# Patient Record
Sex: MALE | Race: WHITE | Employment: OTHER | URBAN - NONMETROPOLITAN AREA
[De-identification: names, ages, dates, MRNs, and addresses within clinical notes are randomized per-mention and may not be internally consistent; named-entity substitution may affect disease eponyms.]

---

## 2017-01-03 ENCOUNTER — PROCEDURE VISIT (OUTPATIENT)
Dept: CARDIOLOGY | Age: 77
End: 2017-01-03

## 2017-01-03 DIAGNOSIS — Z95.810 ICD (IMPLANTABLE CARDIOVERTER-DEFIBRILLATOR), SINGLE, IN SITU: Primary | ICD-10-CM

## 2017-01-03 PROCEDURE — 93282 PRGRMG EVAL IMPLANTABLE DFB: CPT | Performed by: INTERNAL MEDICINE

## 2017-04-03 RX ORDER — DIGOXIN 125 UG/1
TABLET ORAL
Qty: 90 TABLET | Refills: 3 | Status: SHIPPED | OUTPATIENT
Start: 2017-04-03 | End: 2018-04-12 | Stop reason: SDUPTHER

## 2017-04-13 ENCOUNTER — PROCEDURE VISIT (OUTPATIENT)
Dept: CARDIOLOGY | Age: 77
End: 2017-04-13

## 2017-04-13 DIAGNOSIS — Z95.810 ICD (IMPLANTABLE CARDIOVERTER-DEFIBRILLATOR), SINGLE, IN SITU: Primary | ICD-10-CM

## 2017-04-13 PROCEDURE — 93295 DEV INTERROG REMOTE 1/2/MLT: CPT | Performed by: INTERNAL MEDICINE

## 2017-04-13 PROCEDURE — 93296 REM INTERROG EVL PM/IDS: CPT | Performed by: INTERNAL MEDICINE

## 2017-06-27 ENCOUNTER — OFFICE VISIT (OUTPATIENT)
Dept: CARDIOLOGY | Age: 77
End: 2017-06-27

## 2017-06-27 VITALS
SYSTOLIC BLOOD PRESSURE: 142 MMHG | HEART RATE: 64 BPM | DIASTOLIC BLOOD PRESSURE: 96 MMHG | WEIGHT: 188.4 LBS | BODY MASS INDEX: 24.86 KG/M2

## 2017-06-27 DIAGNOSIS — I25.10 CORONARY ARTERY DISEASE INVOLVING NATIVE CORONARY ARTERY OF NATIVE HEART WITHOUT ANGINA PECTORIS: Primary | ICD-10-CM

## 2017-06-27 DIAGNOSIS — I42.9 CARDIOMYOPATHY (HCC): ICD-10-CM

## 2017-06-27 DIAGNOSIS — I10 ESSENTIAL HYPERTENSION: ICD-10-CM

## 2017-06-27 DIAGNOSIS — Z95.810 ICD (IMPLANTABLE CARDIOVERTER-DEFIBRILLATOR) IN PLACE: ICD-10-CM

## 2017-06-27 PROCEDURE — 1123F ACP DISCUSS/DSCN MKR DOCD: CPT | Performed by: NUCLEAR MEDICINE

## 2017-06-27 PROCEDURE — G8427 DOCREV CUR MEDS BY ELIG CLIN: HCPCS | Performed by: NUCLEAR MEDICINE

## 2017-06-27 PROCEDURE — 4040F PNEUMOC VAC/ADMIN/RCVD: CPT | Performed by: NUCLEAR MEDICINE

## 2017-06-27 PROCEDURE — 99213 OFFICE O/P EST LOW 20 MIN: CPT | Performed by: NUCLEAR MEDICINE

## 2017-06-27 PROCEDURE — 1036F TOBACCO NON-USER: CPT | Performed by: NUCLEAR MEDICINE

## 2017-06-27 PROCEDURE — G8420 CALC BMI NORM PARAMETERS: HCPCS | Performed by: NUCLEAR MEDICINE

## 2017-06-27 PROCEDURE — G8598 ASA/ANTIPLAT THER USED: HCPCS | Performed by: NUCLEAR MEDICINE

## 2017-07-20 ENCOUNTER — PROCEDURE VISIT (OUTPATIENT)
Dept: CARDIOLOGY CLINIC | Age: 77
End: 2017-07-20
Payer: MEDICARE

## 2017-07-20 DIAGNOSIS — Z95.810 ICD (IMPLANTABLE CARDIOVERTER-DEFIBRILLATOR), SINGLE, IN SITU: Primary | ICD-10-CM

## 2017-07-20 PROCEDURE — 93295 DEV INTERROG REMOTE 1/2/MLT: CPT | Performed by: INTERNAL MEDICINE

## 2017-07-20 PROCEDURE — 93296 REM INTERROG EVL PM/IDS: CPT | Performed by: INTERNAL MEDICINE

## 2017-08-14 RX ORDER — LISINOPRIL 5 MG/1
TABLET ORAL
Qty: 90 TABLET | Refills: 1 | Status: SHIPPED | OUTPATIENT
Start: 2017-08-14 | End: 2018-01-31 | Stop reason: SDUPTHER

## 2017-09-15 RX ORDER — BUMETANIDE 2 MG/1
TABLET ORAL
Qty: 180 TABLET | Refills: 2 | Status: SHIPPED | OUTPATIENT
Start: 2017-09-15 | End: 2018-03-07 | Stop reason: CLARIF

## 2017-10-25 ENCOUNTER — PROCEDURE VISIT (OUTPATIENT)
Dept: CARDIOLOGY CLINIC | Age: 77
End: 2017-10-25
Payer: MEDICARE

## 2017-10-25 DIAGNOSIS — Z95.810 ICD (IMPLANTABLE CARDIOVERTER-DEFIBRILLATOR), SINGLE, IN SITU: Primary | ICD-10-CM

## 2017-10-25 PROCEDURE — 93295 DEV INTERROG REMOTE 1/2/MLT: CPT | Performed by: INTERNAL MEDICINE

## 2017-10-25 PROCEDURE — 93296 REM INTERROG EVL PM/IDS: CPT | Performed by: INTERNAL MEDICINE

## 2018-01-02 ENCOUNTER — NURSE ONLY (OUTPATIENT)
Dept: CARDIOLOGY CLINIC | Age: 78
End: 2018-01-02
Payer: MEDICARE

## 2018-01-02 DIAGNOSIS — I48.20 CHRONIC ATRIAL FIBRILLATION (HCC): Primary | ICD-10-CM

## 2018-01-02 DIAGNOSIS — R60.0 FLUID RETENTION IN LEGS: ICD-10-CM

## 2018-01-02 DIAGNOSIS — Z95.810 ICD (IMPLANTABLE CARDIOVERTER-DEFIBRILLATOR), SINGLE, IN SITU: Primary | ICD-10-CM

## 2018-01-02 PROCEDURE — 93282 PRGRMG EVAL IMPLANTABLE DFB: CPT | Performed by: INTERNAL MEDICINE

## 2018-01-02 NOTE — PROGRESS NOTES
9 years on device   R waves 6.2  81% paced   RV imped 330 shock 57  RV threshold 0.6 @ 0.4, amplitude to 2.3    Has home monitoring

## 2018-01-03 RX ORDER — FUROSEMIDE 40 MG/1
40 TABLET ORAL 2 TIMES DAILY
COMMUNITY
End: 2018-01-03 | Stop reason: SDUPTHER

## 2018-01-03 RX ORDER — FUROSEMIDE 40 MG/1
40 TABLET ORAL 2 TIMES DAILY
Qty: 60 TABLET | Refills: 1 | Status: SHIPPED | OUTPATIENT
Start: 2018-01-03 | End: 2018-02-22 | Stop reason: SDUPTHER

## 2018-01-24 LAB
BUN BLDV-MCNC: 11 MG/DL
CALCIUM SERPL-MCNC: 8.6 MG/DL
CHLORIDE BLD-SCNC: 102 MMOL/L
CO2: 32 MMOL/L
CREAT SERPL-MCNC: 1.4 MG/DL
GFR CALCULATED: 52
GLUCOSE BLD-MCNC: 122 MG/DL
POTASSIUM SERPL-SCNC: 4 MMOL/L
SODIUM BLD-SCNC: 143 MMOL/L

## 2018-01-31 RX ORDER — LISINOPRIL 5 MG/1
TABLET ORAL
Qty: 90 TABLET | Refills: 3 | Status: SHIPPED | OUTPATIENT
Start: 2018-01-31 | End: 2018-05-15 | Stop reason: ALTCHOICE

## 2018-02-22 RX ORDER — FUROSEMIDE 40 MG/1
TABLET ORAL
Qty: 120 TABLET | Refills: 3 | Status: SHIPPED | OUTPATIENT
Start: 2018-02-22 | End: 2018-06-18 | Stop reason: SDUPTHER

## 2018-02-27 ENCOUNTER — OFFICE VISIT (OUTPATIENT)
Dept: CARDIOLOGY CLINIC | Age: 78
End: 2018-02-27
Payer: MEDICARE

## 2018-02-27 VITALS
WEIGHT: 191.2 LBS | BODY MASS INDEX: 25.34 KG/M2 | HEIGHT: 73 IN | DIASTOLIC BLOOD PRESSURE: 64 MMHG | SYSTOLIC BLOOD PRESSURE: 104 MMHG | HEART RATE: 68 BPM

## 2018-02-27 DIAGNOSIS — I48.0 PAROXYSMAL ATRIAL FIBRILLATION (HCC): ICD-10-CM

## 2018-02-27 DIAGNOSIS — I25.5 ISCHEMIC CARDIOMYOPATHY: ICD-10-CM

## 2018-02-27 DIAGNOSIS — Z45.02 ICD (IMPLANTABLE CARDIOVERTER-DEFIBRILLATOR) BATTERY DEPLETION: ICD-10-CM

## 2018-02-27 DIAGNOSIS — R06.09 DYSPNEA ON EXERTION: Primary | ICD-10-CM

## 2018-02-27 DIAGNOSIS — I10 ESSENTIAL HYPERTENSION: ICD-10-CM

## 2018-02-27 PROCEDURE — G8419 CALC BMI OUT NRM PARAM NOF/U: HCPCS | Performed by: NUCLEAR MEDICINE

## 2018-02-27 PROCEDURE — G8598 ASA/ANTIPLAT THER USED: HCPCS | Performed by: NUCLEAR MEDICINE

## 2018-02-27 PROCEDURE — 99214 OFFICE O/P EST MOD 30 MIN: CPT | Performed by: NUCLEAR MEDICINE

## 2018-02-27 PROCEDURE — 1123F ACP DISCUSS/DSCN MKR DOCD: CPT | Performed by: NUCLEAR MEDICINE

## 2018-02-27 PROCEDURE — 4040F PNEUMOC VAC/ADMIN/RCVD: CPT | Performed by: NUCLEAR MEDICINE

## 2018-02-27 PROCEDURE — G8428 CUR MEDS NOT DOCUMENT: HCPCS | Performed by: NUCLEAR MEDICINE

## 2018-02-27 PROCEDURE — 1036F TOBACCO NON-USER: CPT | Performed by: NUCLEAR MEDICINE

## 2018-02-27 PROCEDURE — G8484 FLU IMMUNIZE NO ADMIN: HCPCS | Performed by: NUCLEAR MEDICINE

## 2018-02-27 NOTE — PROGRESS NOTES
[facility]ESSION[facility]  [facility]  [address] [facility] [address]  Dept: [phone]  Dept Fax: [phone]  Loc: [phone]    Visit Date: 2/27/2018    Zohra Yanez is a 76 y.o. male who presents today for:  Chief Complaint   Patient presents with    Check-Up    Coronary Artery Disease    Shortness of Breath    Cardiomyopathy   worsening dyspnea  More at night   Some CHF  No ICD shocks  Some renal disease  Leg edema  Exertional   A fib is stable   Lower BP   No ischemia work up in a while   Some tachycardia       HPI:  HPI  Past Medical History:   Diagnosis Date    Arthritis     Atrial fibrillation [facility])     Direct Access [employer] Scientific single ICD 1/6/2016    Cardiomyopathy ([address]     Cardiomyopathy, likely nonischemic based on the patient's description. However, don't have a cardiac cath report from [place].  Hypertension     ICD (implantable cardiac defibrillator) battery depletion: 9/24/2013: Generator replacement using [employer] 9/24/2013 9/24/2013: Generator replacement using [employer] device. Atrial lead was capped. Dr. Antonio Cruz ICD (implantable cardiac defibrillator), dual, in situ     [employer] dual ICD    Low blood pressure     Low blood pressure with rapid atrial fibrillation.  Prolonged emergence from general anesthesia       Past Surgical History:   Procedure Laterality Date   [facility] CARDIAC PACEMAKER PLACEMENT  10 03 2007    [place], [place].  CARDIAC PACEMAKER PLACEMENT      9/13    CHOLECYSTECTOMY      DOPPLER ECHOCARDIOGRAPHY  7 08 2010    Global LV dilatation and dysfunction. EF 35%. Moderate biatrial enlargement. Mild mitral regurgitation and mild tricuspid regurgitation. No obvious stenotic valves. Borderline to mild pulmonary hypertension. No pericardial effusion.     HAND SURGERY  06/26/2017   [facility] HERNIA REPAIR  [address] [place], [place].    OTHER SURGICAL HISTORY Left 01/20/2017    Left CMC arthroplasty    UPPER

## 2018-03-14 ENCOUNTER — TELEPHONE (OUTPATIENT)
Dept: CARDIOLOGY CLINIC | Age: 78
End: 2018-03-14

## 2018-03-21 ENCOUNTER — TELEPHONE (OUTPATIENT)
Dept: CARDIOLOGY CLINIC | Age: 78
End: 2018-03-21

## 2018-03-21 DIAGNOSIS — R94.39 ABNORMAL STRESS TEST: ICD-10-CM

## 2018-03-21 DIAGNOSIS — I48.0 PAROXYSMAL ATRIAL FIBRILLATION (HCC): Primary | ICD-10-CM

## 2018-03-21 DIAGNOSIS — I10 ESSENTIAL HYPERTENSION: ICD-10-CM

## 2018-03-28 DIAGNOSIS — Z45.02 ICD (IMPLANTABLE CARDIOVERTER-DEFIBRILLATOR) BATTERY DEPLETION: ICD-10-CM

## 2018-03-28 DIAGNOSIS — R06.09 DYSPNEA ON EXERTION: ICD-10-CM

## 2018-03-28 DIAGNOSIS — I10 ESSENTIAL HYPERTENSION: ICD-10-CM

## 2018-03-28 DIAGNOSIS — I25.5 ISCHEMIC CARDIOMYOPATHY: ICD-10-CM

## 2018-03-28 DIAGNOSIS — I48.0 PAROXYSMAL ATRIAL FIBRILLATION (HCC): ICD-10-CM

## 2018-04-06 ENCOUNTER — PREP FOR PROCEDURE (OUTPATIENT)
Dept: CARDIOLOGY | Age: 78
End: 2018-04-06

## 2018-04-06 RX ORDER — SODIUM CHLORIDE 0.9 % (FLUSH) 0.9 %
10 SYRINGE (ML) INJECTION EVERY 12 HOURS SCHEDULED
Status: CANCELLED | OUTPATIENT
Start: 2018-04-06

## 2018-04-06 RX ORDER — NITROGLYCERIN 0.4 MG/1
0.4 TABLET SUBLINGUAL EVERY 5 MIN PRN
Status: CANCELLED | OUTPATIENT
Start: 2018-04-06

## 2018-04-06 RX ORDER — SODIUM CHLORIDE 0.9 % (FLUSH) 0.9 %
10 SYRINGE (ML) INJECTION PRN
Status: CANCELLED | OUTPATIENT
Start: 2018-04-06

## 2018-04-06 RX ORDER — ASPIRIN 325 MG
325 TABLET ORAL ONCE
Status: CANCELLED | OUTPATIENT
Start: 2018-04-06 | End: 2018-04-06

## 2018-04-06 RX ORDER — SODIUM CHLORIDE 9 MG/ML
INJECTION, SOLUTION INTRAVENOUS CONTINUOUS
Status: CANCELLED | OUTPATIENT
Start: 2018-04-06

## 2018-04-09 ENCOUNTER — HOSPITAL ENCOUNTER (OUTPATIENT)
Dept: INPATIENT UNIT | Age: 78
Discharge: HOME | End: 2018-04-09
Attending: NUCLEAR MEDICINE | Admitting: NUCLEAR MEDICINE
Payer: MEDICARE

## 2018-04-09 ENCOUNTER — APPOINTMENT (OUTPATIENT)
Dept: CARDIAC CATH/INVASIVE PROCEDURES | Age: 78
End: 2018-04-09
Attending: NUCLEAR MEDICINE
Payer: MEDICARE

## 2018-04-09 ENCOUNTER — TELEPHONE (OUTPATIENT)
Dept: CARDIOLOGY CLINIC | Age: 78
End: 2018-04-09

## 2018-04-09 VITALS
HEIGHT: 72 IN | SYSTOLIC BLOOD PRESSURE: 131 MMHG | DIASTOLIC BLOOD PRESSURE: 77 MMHG | OXYGEN SATURATION: 100 % | BODY MASS INDEX: 25.06 KG/M2 | TEMPERATURE: 97.6 F | WEIGHT: 185 LBS | RESPIRATION RATE: 13 BRPM | HEART RATE: 60 BPM

## 2018-04-09 LAB
ABO: NORMAL
ANION GAP SERPL CALCULATED.3IONS-SCNC: 9 MEQ/L (ref 8–16)
ANTIBODY SCREEN: NORMAL
APTT: 37 SECONDS (ref 22–38)
BUN BLDV-MCNC: 16 MG/DL (ref 7–22)
CALCIUM SERPL-MCNC: 9.4 MG/DL (ref 8.5–10.5)
CHLORIDE BLD-SCNC: 102 MEQ/L (ref 98–111)
CO2: 32 MEQ/L (ref 23–33)
CREAT SERPL-MCNC: 1.1 MG/DL (ref 0.4–1.2)
EKG ATRIAL RATE: 59 BPM
EKG Q-T INTERVAL: 538 MS
EKG QRS DURATION: 224 MS
EKG QTC CALCULATION (BAZETT): 538 MS
EKG R AXIS: -90 DEGREES
EKG T AXIS: 90 DEGREES
EKG VENTRICULAR RATE: 60 BPM
GFR SERPL CREATININE-BSD FRML MDRD: 64 ML/MIN/1.73M2
GLUCOSE BLD-MCNC: 95 MG/DL (ref 70–108)
HCT VFR BLD CALC: 40.7 % (ref 42–52)
HEMOGLOBIN: 13.7 GM/DL (ref 14–18)
INR BLD: 1.54 (ref 0.85–1.13)
MCH RBC QN AUTO: 32 PG (ref 27–31)
MCHC RBC AUTO-ENTMCNC: 33.7 GM/DL (ref 33–37)
MCV RBC AUTO: 94.9 FL (ref 80–94)
PDW BLD-RTO: 15.4 % (ref 11.5–14.5)
PLATELET # BLD: 131 THOU/MM3 (ref 130–400)
PMV BLD AUTO: 8.2 FL (ref 7.4–10.4)
POTASSIUM REFLEX MAGNESIUM: 4.7 MEQ/L (ref 3.5–5.2)
RBC # BLD: 4.29 MILL/MM3 (ref 4.7–6.1)
RH FACTOR: NORMAL
SODIUM BLD-SCNC: 143 MEQ/L (ref 135–145)
WBC # BLD: 3.7 THOU/MM3 (ref 4.8–10.8)

## 2018-04-09 PROCEDURE — 85610 PROTHROMBIN TIME: CPT

## 2018-04-09 PROCEDURE — 86850 RBC ANTIBODY SCREEN: CPT

## 2018-04-09 PROCEDURE — C1894 INTRO/SHEATH, NON-LASER: HCPCS

## 2018-04-09 PROCEDURE — 2720000010 HC SURG SUPPLY STERILE

## 2018-04-09 PROCEDURE — 36415 COLL VENOUS BLD VENIPUNCTURE: CPT

## 2018-04-09 PROCEDURE — 85027 COMPLETE CBC AUTOMATED: CPT

## 2018-04-09 PROCEDURE — 6370000000 HC RX 637 (ALT 250 FOR IP): Performed by: PHYSICIAN ASSISTANT

## 2018-04-09 PROCEDURE — 2580000003 HC RX 258: Performed by: PHYSICIAN ASSISTANT

## 2018-04-09 PROCEDURE — 2500000003 HC RX 250 WO HCPCS

## 2018-04-09 PROCEDURE — 2780000010 HC IMPLANT OTHER

## 2018-04-09 PROCEDURE — 86900 BLOOD TYPING SEROLOGIC ABO: CPT

## 2018-04-09 PROCEDURE — 86901 BLOOD TYPING SEROLOGIC RH(D): CPT

## 2018-04-09 PROCEDURE — 93458 L HRT ARTERY/VENTRICLE ANGIO: CPT | Performed by: NUCLEAR MEDICINE

## 2018-04-09 PROCEDURE — 6360000002 HC RX W HCPCS

## 2018-04-09 PROCEDURE — C1769 GUIDE WIRE: HCPCS

## 2018-04-09 PROCEDURE — 85730 THROMBOPLASTIN TIME PARTIAL: CPT

## 2018-04-09 PROCEDURE — 80048 BASIC METABOLIC PNL TOTAL CA: CPT

## 2018-04-09 PROCEDURE — 93005 ELECTROCARDIOGRAM TRACING: CPT | Performed by: PHYSICIAN ASSISTANT

## 2018-04-09 RX ORDER — SODIUM CHLORIDE 0.9 % (FLUSH) 0.9 %
10 SYRINGE (ML) INJECTION EVERY 12 HOURS SCHEDULED
Status: DISCONTINUED | OUTPATIENT
Start: 2018-04-09 | End: 2018-04-09 | Stop reason: HOSPADM

## 2018-04-09 RX ORDER — NITROGLYCERIN 0.4 MG/1
0.4 TABLET SUBLINGUAL EVERY 5 MIN PRN
Status: DISCONTINUED | OUTPATIENT
Start: 2018-04-09 | End: 2018-04-09 | Stop reason: HOSPADM

## 2018-04-09 RX ORDER — ASPIRIN 325 MG
325 TABLET ORAL ONCE
Status: COMPLETED | OUTPATIENT
Start: 2018-04-09 | End: 2018-04-09

## 2018-04-09 RX ORDER — SODIUM CHLORIDE 0.9 % (FLUSH) 0.9 %
10 SYRINGE (ML) INJECTION PRN
Status: DISCONTINUED | OUTPATIENT
Start: 2018-04-09 | End: 2018-04-09 | Stop reason: SDUPTHER

## 2018-04-09 RX ORDER — SODIUM CHLORIDE 0.9 % (FLUSH) 0.9 %
10 SYRINGE (ML) INJECTION PRN
Status: DISCONTINUED | OUTPATIENT
Start: 2018-04-09 | End: 2018-04-09 | Stop reason: HOSPADM

## 2018-04-09 RX ORDER — ONDANSETRON 2 MG/ML
4 INJECTION INTRAMUSCULAR; INTRAVENOUS EVERY 6 HOURS PRN
Status: DISCONTINUED | OUTPATIENT
Start: 2018-04-09 | End: 2018-04-09 | Stop reason: HOSPADM

## 2018-04-09 RX ORDER — SODIUM CHLORIDE 0.9 % (FLUSH) 0.9 %
10 SYRINGE (ML) INJECTION EVERY 12 HOURS SCHEDULED
Status: DISCONTINUED | OUTPATIENT
Start: 2018-04-09 | End: 2018-04-09 | Stop reason: SDUPTHER

## 2018-04-09 RX ORDER — SODIUM CHLORIDE 9 MG/ML
INJECTION, SOLUTION INTRAVENOUS CONTINUOUS
Status: DISCONTINUED | OUTPATIENT
Start: 2018-04-09 | End: 2018-04-09 | Stop reason: HOSPADM

## 2018-04-09 RX ORDER — ACETAMINOPHEN 325 MG/1
650 TABLET ORAL EVERY 4 HOURS PRN
Status: DISCONTINUED | OUTPATIENT
Start: 2018-04-09 | End: 2018-04-09 | Stop reason: HOSPADM

## 2018-04-09 RX ORDER — ATROPINE SULFATE 0.4 MG/ML
0.5 AMPUL (ML) INJECTION
Status: DISCONTINUED | OUTPATIENT
Start: 2018-04-09 | End: 2018-04-09 | Stop reason: HOSPADM

## 2018-04-09 RX ADMIN — ASPIRIN 325 MG: 325 TABLET ORAL at 09:08

## 2018-04-09 RX ADMIN — SODIUM CHLORIDE: 9 INJECTION, SOLUTION INTRAVENOUS at 09:05

## 2018-04-09 ASSESSMENT — PAIN SCALES - GENERAL: PAINLEVEL_OUTOF10: 0

## 2018-04-13 RX ORDER — DIGOXIN 125 UG/1
TABLET ORAL
Qty: 90 TABLET | Refills: 3 | Status: SHIPPED | OUTPATIENT
Start: 2018-04-13 | End: 2019-02-28 | Stop reason: SDUPTHER

## 2018-04-18 ENCOUNTER — OFFICE VISIT (OUTPATIENT)
Dept: CARDIOLOGY CLINIC | Age: 78
End: 2018-04-18
Payer: MEDICARE

## 2018-04-18 VITALS
WEIGHT: 196 LBS | HEART RATE: 62 BPM | SYSTOLIC BLOOD PRESSURE: 110 MMHG | BODY MASS INDEX: 25.15 KG/M2 | DIASTOLIC BLOOD PRESSURE: 72 MMHG | RESPIRATION RATE: 16 BRPM | HEIGHT: 74 IN

## 2018-04-18 DIAGNOSIS — I48.0 PAROXYSMAL ATRIAL FIBRILLATION (HCC): ICD-10-CM

## 2018-04-18 DIAGNOSIS — I25.5 ISCHEMIC CARDIOMYOPATHY: Primary | ICD-10-CM

## 2018-04-18 PROCEDURE — 4040F PNEUMOC VAC/ADMIN/RCVD: CPT | Performed by: INTERNAL MEDICINE

## 2018-04-18 PROCEDURE — G8419 CALC BMI OUT NRM PARAM NOF/U: HCPCS | Performed by: INTERNAL MEDICINE

## 2018-04-18 PROCEDURE — 93000 ELECTROCARDIOGRAM COMPLETE: CPT | Performed by: INTERNAL MEDICINE

## 2018-04-18 PROCEDURE — 99205 OFFICE O/P NEW HI 60 MIN: CPT | Performed by: INTERNAL MEDICINE

## 2018-04-18 PROCEDURE — 1036F TOBACCO NON-USER: CPT | Performed by: INTERNAL MEDICINE

## 2018-04-18 PROCEDURE — 1123F ACP DISCUSS/DSCN MKR DOCD: CPT | Performed by: INTERNAL MEDICINE

## 2018-04-18 PROCEDURE — G8598 ASA/ANTIPLAT THER USED: HCPCS | Performed by: INTERNAL MEDICINE

## 2018-04-18 PROCEDURE — G8427 DOCREV CUR MEDS BY ELIG CLIN: HCPCS | Performed by: INTERNAL MEDICINE

## 2018-04-18 ASSESSMENT — ENCOUNTER SYMPTOMS
SORE THROAT: 0
CONSTIPATION: 0
RIGHT EYE: 0
COUGH: 0
DOUBLE VISION: 0
BACK PAIN: 0
LEFT EYE: 0
SHORTNESS OF BREATH: 0
WHEEZING: 0
VOMITING: 0
ABDOMINAL PAIN: 0
DIARRHEA: 0
BLURRED VISION: 0
NAUSEA: 0

## 2018-04-24 ENCOUNTER — PROCEDURE VISIT (OUTPATIENT)
Dept: CARDIOLOGY CLINIC | Age: 78
End: 2018-04-24
Payer: MEDICARE

## 2018-04-24 DIAGNOSIS — Z95.810 ICD (IMPLANTABLE CARDIOVERTER-DEFIBRILLATOR), SINGLE, IN SITU: Primary | ICD-10-CM

## 2018-04-24 PROCEDURE — 93295 DEV INTERROG REMOTE 1/2/MLT: CPT | Performed by: INTERNAL MEDICINE

## 2018-04-24 PROCEDURE — 93296 REM INTERROG EVL PM/IDS: CPT | Performed by: INTERNAL MEDICINE

## 2018-04-27 LAB
BASOPHILS ABSOLUTE: ABNORMAL /ΜL
BASOPHILS RELATIVE PERCENT: ABNORMAL %
BUN BLDV-MCNC: 11 MG/DL
CALCIUM SERPL-MCNC: 9 MG/DL
CHLORIDE BLD-SCNC: 101 MMOL/L
CO2: 29 MMOL/L
CREAT SERPL-MCNC: 1.2 MG/DL
EOSINOPHILS ABSOLUTE: ABNORMAL /ΜL
EOSINOPHILS RELATIVE PERCENT: ABNORMAL %
GFR CALCULATED: >60
GLUCOSE BLD-MCNC: 136 MG/DL
HCT VFR BLD CALC: 39 % (ref 41–53)
HEMOGLOBIN: 12.6 G/DL (ref 13.5–17.5)
INR BLD: 2.2
LYMPHOCYTES ABSOLUTE: ABNORMAL /ΜL
LYMPHOCYTES RELATIVE PERCENT: ABNORMAL %
MCH RBC QN AUTO: ABNORMAL PG
MCHC RBC AUTO-ENTMCNC: ABNORMAL G/DL
MCV RBC AUTO: ABNORMAL FL
MONOCYTES ABSOLUTE: ABNORMAL /ΜL
MONOCYTES RELATIVE PERCENT: ABNORMAL %
NEUTROPHILS ABSOLUTE: ABNORMAL /ΜL
NEUTROPHILS RELATIVE PERCENT: ABNORMAL %
PLATELET # BLD: 136 K/ΜL
PMV BLD AUTO: ABNORMAL FL
POTASSIUM SERPL-SCNC: 3.8 MMOL/L
PROTIME: 24.5 SECONDS
RBC # BLD: 4.06 10^6/ΜL
SODIUM BLD-SCNC: 143 MMOL/L
WBC # BLD: 3.5 10^3/ML

## 2018-05-01 DIAGNOSIS — I48.0 PAROXYSMAL ATRIAL FIBRILLATION (HCC): ICD-10-CM

## 2018-05-01 DIAGNOSIS — I25.5 ISCHEMIC CARDIOMYOPATHY: ICD-10-CM

## 2018-05-03 ENCOUNTER — PREP FOR PROCEDURE (OUTPATIENT)
Dept: CARDIOLOGY | Age: 78
End: 2018-05-03

## 2018-05-03 RX ORDER — SODIUM CHLORIDE 0.9 % (FLUSH) 0.9 %
10 SYRINGE (ML) INJECTION PRN
Status: CANCELLED | OUTPATIENT
Start: 2018-05-03

## 2018-05-03 RX ORDER — SODIUM CHLORIDE 9 MG/ML
INJECTION, SOLUTION INTRAVENOUS CONTINUOUS
Status: CANCELLED | OUTPATIENT
Start: 2018-05-03

## 2018-05-03 RX ORDER — SODIUM CHLORIDE 0.9 % (FLUSH) 0.9 %
10 SYRINGE (ML) INJECTION EVERY 12 HOURS SCHEDULED
Status: CANCELLED | OUTPATIENT
Start: 2018-05-03

## 2018-05-03 RX ORDER — CHLORHEXIDINE GLUCONATE 4 G/100ML
SOLUTION TOPICAL ONCE
Status: CANCELLED | OUTPATIENT
Start: 2018-05-03 | End: 2018-05-03

## 2018-05-04 ENCOUNTER — HOSPITAL ENCOUNTER (OUTPATIENT)
Dept: INPATIENT UNIT | Age: 78
Discharge: HOME | End: 2018-05-05
Attending: INTERNAL MEDICINE | Admitting: INTERNAL MEDICINE
Payer: MEDICARE

## 2018-05-04 ENCOUNTER — APPOINTMENT (OUTPATIENT)
Dept: GENERAL RADIOLOGY | Age: 78
End: 2018-05-04
Attending: INTERNAL MEDICINE
Payer: MEDICARE

## 2018-05-04 ENCOUNTER — ANESTHESIA (OUTPATIENT)
Dept: CARDIAC CATH/INVASIVE PROCEDURES | Age: 78
End: 2018-05-04
Payer: MEDICARE

## 2018-05-04 ENCOUNTER — ANESTHESIA EVENT (OUTPATIENT)
Dept: CARDIAC CATH/INVASIVE PROCEDURES | Age: 78
End: 2018-05-04
Payer: MEDICARE

## 2018-05-04 ENCOUNTER — APPOINTMENT (OUTPATIENT)
Dept: CARDIAC CATH/INVASIVE PROCEDURES | Age: 78
End: 2018-05-04
Attending: INTERNAL MEDICINE
Payer: MEDICARE

## 2018-05-04 VITALS — DIASTOLIC BLOOD PRESSURE: 68 MMHG | OXYGEN SATURATION: 92 % | SYSTOLIC BLOOD PRESSURE: 127 MMHG

## 2018-05-04 PROBLEM — I42.9 CARDIOMYOPATHY (HCC): Status: ACTIVE | Noted: 2018-05-04

## 2018-05-04 LAB
ABO: NORMAL
ANION GAP SERPL CALCULATED.3IONS-SCNC: 7 MEQ/L (ref 8–16)
ANISOCYTOSIS: ABNORMAL
ANTIBODY SCREEN: NORMAL
APTT: 37.8 SECONDS (ref 22–38)
BASOPHILS # BLD: 1.3 %
BASOPHILS ABSOLUTE: 0 THOU/MM3 (ref 0–0.1)
BUN BLDV-MCNC: 11 MG/DL (ref 7–22)
CALCIUM SERPL-MCNC: 9.3 MG/DL (ref 8.5–10.5)
CHLORIDE BLD-SCNC: 103 MEQ/L (ref 98–111)
CO2: 34 MEQ/L (ref 23–33)
CREAT SERPL-MCNC: 1.2 MG/DL (ref 0.4–1.2)
EOSINOPHIL # BLD: 6.5 %
EOSINOPHILS ABSOLUTE: 0.2 THOU/MM3 (ref 0–0.4)
GFR SERPL CREATININE-BSD FRML MDRD: 58 ML/MIN/1.73M2
GLUCOSE BLD-MCNC: 94 MG/DL (ref 70–108)
HCT VFR BLD CALC: 39.5 % (ref 42–52)
HEMOGLOBIN: 13.3 GM/DL (ref 14–18)
INR BLD: 1.6 (ref 0.85–1.13)
LYMPHOCYTES # BLD: 29.7 %
LYMPHOCYTES ABSOLUTE: 1 THOU/MM3 (ref 1–4.8)
MCH RBC QN AUTO: 32 PG (ref 27–31)
MCHC RBC AUTO-ENTMCNC: 33.7 GM/DL (ref 33–37)
MCV RBC AUTO: 95.1 FL (ref 80–94)
MONOCYTES # BLD: 21.2 %
MONOCYTES ABSOLUTE: 0.7 THOU/MM3 (ref 0.4–1.3)
NUCLEATED RED BLOOD CELLS: 0 /100 WBC
PDW BLD-RTO: 15.2 % (ref 11.5–14.5)
PLATELET # BLD: 155 THOU/MM3 (ref 130–400)
PMV BLD AUTO: 8 FL (ref 7.4–10.4)
POTASSIUM SERPL-SCNC: 4.8 MEQ/L (ref 3.5–5.2)
RBC # BLD: 4.15 MILL/MM3 (ref 4.7–6.1)
RH FACTOR: NORMAL
SEG NEUTROPHILS: 41.3 %
SEGMENTED NEUTROPHILS ABSOLUTE COUNT: 1.4 THOU/MM3 (ref 1.8–7.7)
SODIUM BLD-SCNC: 144 MEQ/L (ref 135–145)
WBC # BLD: 3.3 THOU/MM3 (ref 4.8–10.8)

## 2018-05-04 PROCEDURE — 2580000003 HC RX 258: Performed by: NURSE PRACTITIONER

## 2018-05-04 PROCEDURE — 71045 X-RAY EXAM CHEST 1 VIEW: CPT

## 2018-05-04 PROCEDURE — 86900 BLOOD TYPING SEROLOGIC ABO: CPT

## 2018-05-04 PROCEDURE — 6360000002 HC RX W HCPCS: Performed by: NURSE PRACTITIONER

## 2018-05-04 PROCEDURE — 6360000002 HC RX W HCPCS: Performed by: ANESTHESIOLOGY

## 2018-05-04 PROCEDURE — C1769 GUIDE WIRE: HCPCS

## 2018-05-04 PROCEDURE — 6360000002 HC RX W HCPCS: Performed by: INTERNAL MEDICINE

## 2018-05-04 PROCEDURE — 80048 BASIC METABOLIC PNL TOTAL CA: CPT

## 2018-05-04 PROCEDURE — 3700000000 HC ANESTHESIA ATTENDED CARE

## 2018-05-04 PROCEDURE — 93641 EP EVL 1/2CHMB PAC CVDFB TST: CPT | Performed by: INTERNAL MEDICINE

## 2018-05-04 PROCEDURE — C1882 AICD, OTHER THAN SING/DUAL: HCPCS

## 2018-05-04 PROCEDURE — 7100000001 HC PACU RECOVERY - ADDTL 15 MIN

## 2018-05-04 PROCEDURE — 33225 L VENTRIC PACING LEAD ADD-ON: CPT

## 2018-05-04 PROCEDURE — 93005 ELECTROCARDIOGRAM TRACING: CPT | Performed by: NURSE PRACTITIONER

## 2018-05-04 PROCEDURE — C1894 INTRO/SHEATH, NON-LASER: HCPCS

## 2018-05-04 PROCEDURE — 2500000003 HC RX 250 WO HCPCS

## 2018-05-04 PROCEDURE — 6360000002 HC RX W HCPCS: Performed by: NURSE ANESTHETIST, CERTIFIED REGISTERED

## 2018-05-04 PROCEDURE — 7100000000 HC PACU RECOVERY - FIRST 15 MIN

## 2018-05-04 PROCEDURE — 85730 THROMBOPLASTIN TIME PARTIAL: CPT

## 2018-05-04 PROCEDURE — 85610 PROTHROMBIN TIME: CPT

## 2018-05-04 PROCEDURE — 33264 RMVL & RPLCMT DFB GEN MLT LD: CPT | Performed by: INTERNAL MEDICINE

## 2018-05-04 PROCEDURE — 85025 COMPLETE CBC W/AUTO DIFF WBC: CPT

## 2018-05-04 PROCEDURE — 86850 RBC ANTIBODY SCREEN: CPT

## 2018-05-04 PROCEDURE — 33264 RMVL & RPLCMT DFB GEN MLT LD: CPT

## 2018-05-04 PROCEDURE — C1900 LEAD, CORONARY VENOUS: HCPCS

## 2018-05-04 PROCEDURE — C1893 INTRO/SHEATH, FIXED,NON-PEEL: HCPCS

## 2018-05-04 PROCEDURE — 2500000003 HC RX 250 WO HCPCS: Performed by: NURSE ANESTHETIST, CERTIFIED REGISTERED

## 2018-05-04 PROCEDURE — 86901 BLOOD TYPING SEROLOGIC RH(D): CPT

## 2018-05-04 PROCEDURE — 36415 COLL VENOUS BLD VENIPUNCTURE: CPT

## 2018-05-04 PROCEDURE — 3700000001 HC ADD 15 MINUTES (ANESTHESIA)

## 2018-05-04 PROCEDURE — 33225 L VENTRIC PACING LEAD ADD-ON: CPT | Performed by: INTERNAL MEDICINE

## 2018-05-04 PROCEDURE — C1773 RET DEV, INSERTABLE: HCPCS

## 2018-05-04 RX ORDER — WARFARIN SODIUM 5 MG/1
5 TABLET ORAL EVERY EVENING
COMMUNITY

## 2018-05-04 RX ORDER — DIGOXIN 125 MCG
125 TABLET ORAL DAILY
Status: DISCONTINUED | OUTPATIENT
Start: 2018-05-05 | End: 2018-05-05 | Stop reason: HOSPADM

## 2018-05-04 RX ORDER — CHLORHEXIDINE GLUCONATE 4 G/100ML
SOLUTION TOPICAL ONCE
Status: DISCONTINUED | OUTPATIENT
Start: 2018-05-04 | End: 2018-05-05

## 2018-05-04 RX ORDER — DEXAMETHASONE SODIUM PHOSPHATE 4 MG/ML
INJECTION, SOLUTION INTRA-ARTICULAR; INTRALESIONAL; INTRAMUSCULAR; INTRAVENOUS; SOFT TISSUE PRN
Status: DISCONTINUED | OUTPATIENT
Start: 2018-05-04 | End: 2018-05-04 | Stop reason: SDUPTHER

## 2018-05-04 RX ORDER — LABETALOL HYDROCHLORIDE 5 MG/ML
10 INJECTION, SOLUTION INTRAVENOUS EVERY 10 MIN PRN
Status: DISCONTINUED | OUTPATIENT
Start: 2018-05-04 | End: 2018-05-05

## 2018-05-04 RX ORDER — FENTANYL CITRATE 50 UG/ML
50 INJECTION, SOLUTION INTRAMUSCULAR; INTRAVENOUS EVERY 5 MIN PRN
Status: DISCONTINUED | OUTPATIENT
Start: 2018-05-04 | End: 2018-05-05

## 2018-05-04 RX ORDER — FUROSEMIDE 40 MG/1
40 TABLET ORAL 2 TIMES DAILY
Status: DISCONTINUED | OUTPATIENT
Start: 2018-05-04 | End: 2018-05-05 | Stop reason: HOSPADM

## 2018-05-04 RX ORDER — ONDANSETRON 2 MG/ML
4 INJECTION INTRAMUSCULAR; INTRAVENOUS EVERY 6 HOURS PRN
Status: DISCONTINUED | OUTPATIENT
Start: 2018-05-04 | End: 2018-05-05 | Stop reason: HOSPADM

## 2018-05-04 RX ORDER — FUROSEMIDE 40 MG/1
40 TABLET ORAL DAILY
Status: DISCONTINUED | OUTPATIENT
Start: 2018-05-04 | End: 2018-05-04

## 2018-05-04 RX ORDER — POTASSIUM CHLORIDE 20 MEQ/1
20 TABLET, EXTENDED RELEASE ORAL 2 TIMES DAILY
Status: DISCONTINUED | OUTPATIENT
Start: 2018-05-04 | End: 2018-05-05 | Stop reason: HOSPADM

## 2018-05-04 RX ORDER — EPHEDRINE SULFATE 50 MG/ML
INJECTION INTRAVENOUS PRN
Status: DISCONTINUED | OUTPATIENT
Start: 2018-05-04 | End: 2018-05-04 | Stop reason: SDUPTHER

## 2018-05-04 RX ORDER — PROMETHAZINE HYDROCHLORIDE 25 MG/ML
12.5 INJECTION, SOLUTION INTRAMUSCULAR; INTRAVENOUS
Status: ACTIVE | OUTPATIENT
Start: 2018-05-04 | End: 2018-05-04

## 2018-05-04 RX ORDER — SODIUM CHLORIDE 0.9 % (FLUSH) 0.9 %
10 SYRINGE (ML) INJECTION PRN
Status: DISCONTINUED | OUTPATIENT
Start: 2018-05-04 | End: 2018-05-05 | Stop reason: HOSPADM

## 2018-05-04 RX ORDER — LIDOCAINE HYDROCHLORIDE 20 MG/ML
INJECTION, SOLUTION EPIDURAL; INFILTRATION; INTRACAUDAL; PERINEURAL PRN
Status: DISCONTINUED | OUTPATIENT
Start: 2018-05-04 | End: 2018-05-04 | Stop reason: SDUPTHER

## 2018-05-04 RX ORDER — HYDROCODONE BITARTRATE AND ACETAMINOPHEN 5; 325 MG/1; MG/1
1 TABLET ORAL EVERY 4 HOURS PRN
Status: DISCONTINUED | OUTPATIENT
Start: 2018-05-04 | End: 2018-05-05 | Stop reason: HOSPADM

## 2018-05-04 RX ORDER — SODIUM CHLORIDE 0.9 % (FLUSH) 0.9 %
10 SYRINGE (ML) INJECTION EVERY 12 HOURS SCHEDULED
Status: DISCONTINUED | OUTPATIENT
Start: 2018-05-04 | End: 2018-05-05

## 2018-05-04 RX ORDER — FENTANYL CITRATE 50 UG/ML
INJECTION, SOLUTION INTRAMUSCULAR; INTRAVENOUS PRN
Status: DISCONTINUED | OUTPATIENT
Start: 2018-05-04 | End: 2018-05-04 | Stop reason: SDUPTHER

## 2018-05-04 RX ORDER — HYDROCODONE BITARTRATE AND ACETAMINOPHEN 5; 325 MG/1; MG/1
2 TABLET ORAL EVERY 4 HOURS PRN
Status: DISCONTINUED | OUTPATIENT
Start: 2018-05-04 | End: 2018-05-05 | Stop reason: HOSPADM

## 2018-05-04 RX ORDER — ACETAMINOPHEN 325 MG/1
650 TABLET ORAL EVERY 4 HOURS PRN
Status: DISCONTINUED | OUTPATIENT
Start: 2018-05-04 | End: 2018-05-05 | Stop reason: HOSPADM

## 2018-05-04 RX ORDER — SODIUM CHLORIDE 0.9 % (FLUSH) 0.9 %
10 SYRINGE (ML) INJECTION EVERY 12 HOURS SCHEDULED
Status: DISCONTINUED | OUTPATIENT
Start: 2018-05-04 | End: 2018-05-05 | Stop reason: HOSPADM

## 2018-05-04 RX ORDER — SODIUM CHLORIDE 9 MG/ML
INJECTION, SOLUTION INTRAVENOUS CONTINUOUS
Status: DISCONTINUED | OUTPATIENT
Start: 2018-05-04 | End: 2018-05-05

## 2018-05-04 RX ORDER — FENTANYL CITRATE 50 UG/ML
25 INJECTION, SOLUTION INTRAMUSCULAR; INTRAVENOUS EVERY 5 MIN PRN
Status: DISCONTINUED | OUTPATIENT
Start: 2018-05-04 | End: 2018-05-05

## 2018-05-04 RX ORDER — ONDANSETRON 2 MG/ML
INJECTION INTRAMUSCULAR; INTRAVENOUS PRN
Status: DISCONTINUED | OUTPATIENT
Start: 2018-05-04 | End: 2018-05-04 | Stop reason: SDUPTHER

## 2018-05-04 RX ORDER — LISINOPRIL 5 MG/1
5 TABLET ORAL DAILY
Status: DISCONTINUED | OUTPATIENT
Start: 2018-05-05 | End: 2018-05-05 | Stop reason: HOSPADM

## 2018-05-04 RX ORDER — SODIUM CHLORIDE 0.9 % (FLUSH) 0.9 %
10 SYRINGE (ML) INJECTION PRN
Status: DISCONTINUED | OUTPATIENT
Start: 2018-05-04 | End: 2018-05-05

## 2018-05-04 RX ORDER — PROPOFOL 10 MG/ML
INJECTION, EMULSION INTRAVENOUS PRN
Status: DISCONTINUED | OUTPATIENT
Start: 2018-05-04 | End: 2018-05-04 | Stop reason: SDUPTHER

## 2018-05-04 RX ADMIN — PHENYLEPHRINE HYDROCHLORIDE 200 MCG: 10 INJECTION INTRAMUSCULAR; INTRAVENOUS; SUBCUTANEOUS at 08:47

## 2018-05-04 RX ADMIN — Medication 10 ML: at 21:01

## 2018-05-04 RX ADMIN — PHENYLEPHRINE HYDROCHLORIDE 100 MCG: 10 INJECTION INTRAMUSCULAR; INTRAVENOUS; SUBCUTANEOUS at 08:51

## 2018-05-04 RX ADMIN — FUROSEMIDE 40 MG: 40 TABLET ORAL at 16:19

## 2018-05-04 RX ADMIN — PROPOFOL 50 MG: 10 INJECTION, EMULSION INTRAVENOUS at 08:31

## 2018-05-04 RX ADMIN — FENTANYL CITRATE 50 MCG: 50 INJECTION, SOLUTION INTRAMUSCULAR; INTRAVENOUS at 08:30

## 2018-05-04 RX ADMIN — PHENYLEPHRINE HYDROCHLORIDE 100 MCG: 10 INJECTION INTRAMUSCULAR; INTRAVENOUS; SUBCUTANEOUS at 08:39

## 2018-05-04 RX ADMIN — EPHEDRINE SULFATE 20 MG: 50 INJECTION, SOLUTION INTRAVENOUS at 09:24

## 2018-05-04 RX ADMIN — CEFAZOLIN SODIUM 2 G: 2 SOLUTION INTRAVENOUS at 16:12

## 2018-05-04 RX ADMIN — FENTANYL CITRATE 25 MCG: 50 INJECTION, SOLUTION INTRAMUSCULAR; INTRAVENOUS at 11:12

## 2018-05-04 RX ADMIN — PHENYLEPHRINE HYDROCHLORIDE 200 MCG: 10 INJECTION INTRAMUSCULAR; INTRAVENOUS; SUBCUTANEOUS at 08:56

## 2018-05-04 RX ADMIN — SODIUM CHLORIDE: 9 INJECTION, SOLUTION INTRAVENOUS at 06:33

## 2018-05-04 RX ADMIN — LIDOCAINE HYDROCHLORIDE 100 MG: 20 INJECTION, SOLUTION EPIDURAL; INFILTRATION; INTRACAUDAL; PERINEURAL at 08:30

## 2018-05-04 RX ADMIN — DEXAMETHASONE SODIUM PHOSPHATE 8 MG: 4 INJECTION, SOLUTION INTRAMUSCULAR; INTRAVENOUS at 08:44

## 2018-05-04 RX ADMIN — PROPOFOL 100 MG: 10 INJECTION, EMULSION INTRAVENOUS at 08:30

## 2018-05-04 RX ADMIN — Medication 12.5 MG: at 21:02

## 2018-05-04 RX ADMIN — POTASSIUM CHLORIDE 20 MEQ: 20 TABLET, EXTENDED RELEASE ORAL at 21:02

## 2018-05-04 RX ADMIN — ONDANSETRON 4 MG: 2 INJECTION INTRAMUSCULAR; INTRAVENOUS at 08:44

## 2018-05-04 RX ADMIN — SODIUM CHLORIDE: 9 INJECTION, SOLUTION INTRAVENOUS at 16:12

## 2018-05-04 RX ADMIN — PHENYLEPHRINE HYDROCHLORIDE 100 MCG: 10 INJECTION INTRAMUSCULAR; INTRAVENOUS; SUBCUTANEOUS at 09:06

## 2018-05-04 RX ADMIN — PHENYLEPHRINE HYDROCHLORIDE 200 MCG: 10 INJECTION INTRAMUSCULAR; INTRAVENOUS; SUBCUTANEOUS at 09:03

## 2018-05-04 RX ADMIN — CEFAZOLIN SODIUM 2 G: 2 SOLUTION INTRAVENOUS at 08:25

## 2018-05-04 RX ADMIN — EPHEDRINE SULFATE 10 MG: 50 INJECTION, SOLUTION INTRAVENOUS at 09:14

## 2018-05-04 RX ADMIN — PHENYLEPHRINE HYDROCHLORIDE 100 MCG: 10 INJECTION INTRAMUSCULAR; INTRAVENOUS; SUBCUTANEOUS at 08:42

## 2018-05-04 ASSESSMENT — PULMONARY FUNCTION TESTS
PIF_VALUE: 11
PIF_VALUE: 11
PIF_VALUE: 10
PIF_VALUE: 11
PIF_VALUE: 10
PIF_VALUE: 10
PIF_VALUE: 11
PIF_VALUE: 2
PIF_VALUE: 12
PIF_VALUE: 11
PIF_VALUE: 10
PIF_VALUE: 11
PIF_VALUE: 10
PIF_VALUE: 10
PIF_VALUE: 11
PIF_VALUE: 10
PIF_VALUE: 11
PIF_VALUE: 10
PIF_VALUE: 10
PIF_VALUE: 11
PIF_VALUE: 10
PIF_VALUE: 12
PIF_VALUE: 11
PIF_VALUE: 10
PIF_VALUE: 11
PIF_VALUE: 0
PIF_VALUE: 0
PIF_VALUE: 10
PIF_VALUE: 10
PIF_VALUE: 11
PIF_VALUE: 10
PIF_VALUE: 11
PIF_VALUE: 11
PIF_VALUE: 10
PIF_VALUE: 11
PIF_VALUE: 11
PIF_VALUE: 10
PIF_VALUE: 1
PIF_VALUE: 10
PIF_VALUE: 10
PIF_VALUE: 11
PIF_VALUE: 11
PIF_VALUE: 10
PIF_VALUE: 1
PIF_VALUE: 11
PIF_VALUE: 10
PIF_VALUE: 10
PIF_VALUE: 11
PIF_VALUE: 1
PIF_VALUE: 10
PIF_VALUE: 10
PIF_VALUE: 0
PIF_VALUE: 11
PIF_VALUE: 10
PIF_VALUE: 10
PIF_VALUE: 11
PIF_VALUE: 10
PIF_VALUE: 11
PIF_VALUE: 2
PIF_VALUE: 11
PIF_VALUE: 10
PIF_VALUE: 1
PIF_VALUE: 11
PIF_VALUE: 10
PIF_VALUE: 11
PIF_VALUE: 14
PIF_VALUE: 10
PIF_VALUE: 11
PIF_VALUE: 10
PIF_VALUE: 11
PIF_VALUE: 10
PIF_VALUE: 11
PIF_VALUE: 10
PIF_VALUE: 10
PIF_VALUE: 11
PIF_VALUE: 10
PIF_VALUE: 10
PIF_VALUE: 1
PIF_VALUE: 11
PIF_VALUE: 0
PIF_VALUE: 10
PIF_VALUE: 11
PIF_VALUE: 13
PIF_VALUE: 0
PIF_VALUE: 11
PIF_VALUE: 1
PIF_VALUE: 10
PIF_VALUE: 10
PIF_VALUE: 11
PIF_VALUE: 10
PIF_VALUE: 10
PIF_VALUE: 12
PIF_VALUE: 10
PIF_VALUE: 12
PIF_VALUE: 10
PIF_VALUE: 0
PIF_VALUE: 10
PIF_VALUE: 11

## 2018-05-04 ASSESSMENT — ENCOUNTER SYMPTOMS: SHORTNESS OF BREATH: 1

## 2018-05-04 ASSESSMENT — PAIN SCALES - GENERAL: PAINLEVEL_OUTOF10: 5

## 2018-05-05 ENCOUNTER — APPOINTMENT (OUTPATIENT)
Dept: GENERAL RADIOLOGY | Age: 78
End: 2018-05-05
Attending: INTERNAL MEDICINE
Payer: MEDICARE

## 2018-05-05 VITALS
BODY MASS INDEX: 21.64 KG/M2 | DIASTOLIC BLOOD PRESSURE: 63 MMHG | SYSTOLIC BLOOD PRESSURE: 118 MMHG | OXYGEN SATURATION: 94 % | HEART RATE: 60 BPM | WEIGHT: 168.6 LBS | TEMPERATURE: 97.5 F | RESPIRATION RATE: 18 BRPM | HEIGHT: 74 IN

## 2018-05-05 PROCEDURE — 71046 X-RAY EXAM CHEST 2 VIEWS: CPT

## 2018-05-05 PROCEDURE — 2580000003 HC RX 258: Performed by: INTERNAL MEDICINE

## 2018-05-05 PROCEDURE — 93005 ELECTROCARDIOGRAM TRACING: CPT | Performed by: INTERNAL MEDICINE

## 2018-05-05 RX ADMIN — Medication 125 MCG: at 08:27

## 2018-05-05 RX ADMIN — POTASSIUM CHLORIDE 20 MEQ: 20 TABLET, EXTENDED RELEASE ORAL at 08:25

## 2018-05-05 RX ADMIN — FUROSEMIDE 40 MG: 40 TABLET ORAL at 08:24

## 2018-05-05 RX ADMIN — Medication 12.5 MG: at 08:28

## 2018-05-05 RX ADMIN — Medication 10 ML: at 08:27

## 2018-05-05 RX ADMIN — LISINOPRIL 5 MG: 5 TABLET ORAL at 08:26

## 2018-05-05 ASSESSMENT — PAIN SCALES - GENERAL: PAINLEVEL_OUTOF10: 0

## 2018-05-06 LAB
EKG ATRIAL RATE: 60 BPM
EKG ATRIAL RATE: 66 BPM
EKG Q-T INTERVAL: 548 MS
EKG Q-T INTERVAL: 550 MS
EKG QRS DURATION: 218 MS
EKG QRS DURATION: 232 MS
EKG QTC CALCULATION (BAZETT): 548 MS
EKG QTC CALCULATION (BAZETT): 550 MS
EKG R AXIS: -89 DEGREES
EKG R AXIS: -95 DEGREES
EKG T AXIS: 83 DEGREES
EKG T AXIS: 92 DEGREES
EKG VENTRICULAR RATE: 60 BPM
EKG VENTRICULAR RATE: 60 BPM

## 2018-05-06 PROCEDURE — 93010 ELECTROCARDIOGRAM REPORT: CPT | Performed by: INTERNAL MEDICINE

## 2018-05-15 ENCOUNTER — OFFICE VISIT (OUTPATIENT)
Dept: CARDIOLOGY CLINIC | Age: 78
End: 2018-05-15
Payer: MEDICARE

## 2018-05-15 ENCOUNTER — NURSE ONLY (OUTPATIENT)
Dept: CARDIOLOGY CLINIC | Age: 78
End: 2018-05-15
Payer: MEDICARE

## 2018-05-15 VITALS
DIASTOLIC BLOOD PRESSURE: 70 MMHG | HEIGHT: 73 IN | WEIGHT: 185 LBS | BODY MASS INDEX: 24.52 KG/M2 | HEART RATE: 64 BPM | SYSTOLIC BLOOD PRESSURE: 122 MMHG

## 2018-05-15 DIAGNOSIS — I42.0 DILATED CARDIOMYOPATHY (HCC): ICD-10-CM

## 2018-05-15 DIAGNOSIS — I10 ESSENTIAL HYPERTENSION: Primary | ICD-10-CM

## 2018-05-15 DIAGNOSIS — Z95.810 ICD (IMPLANTABLE CARDIOVERTER-DEFIBRILLATOR) IN PLACE: ICD-10-CM

## 2018-05-15 DIAGNOSIS — Z95.810 ICD (IMPLANTABLE CARDIOVERTER-DEFIBRILLATOR), SINGLE, IN SITU: Primary | ICD-10-CM

## 2018-05-15 PROCEDURE — G8598 ASA/ANTIPLAT THER USED: HCPCS | Performed by: NUCLEAR MEDICINE

## 2018-05-15 PROCEDURE — 1036F TOBACCO NON-USER: CPT | Performed by: NUCLEAR MEDICINE

## 2018-05-15 PROCEDURE — G8420 CALC BMI NORM PARAMETERS: HCPCS | Performed by: NUCLEAR MEDICINE

## 2018-05-15 PROCEDURE — 93289 INTERROG DEVICE EVAL HEART: CPT | Performed by: INTERNAL MEDICINE

## 2018-05-15 PROCEDURE — 4040F PNEUMOC VAC/ADMIN/RCVD: CPT | Performed by: NUCLEAR MEDICINE

## 2018-05-15 PROCEDURE — 1123F ACP DISCUSS/DSCN MKR DOCD: CPT | Performed by: NUCLEAR MEDICINE

## 2018-05-15 PROCEDURE — 99213 OFFICE O/P EST LOW 20 MIN: CPT | Performed by: NUCLEAR MEDICINE

## 2018-05-15 PROCEDURE — G8427 DOCREV CUR MEDS BY ELIG CLIN: HCPCS | Performed by: NUCLEAR MEDICINE

## 2018-06-18 RX ORDER — FUROSEMIDE 40 MG/1
TABLET ORAL
Qty: 180 TABLET | Refills: 1 | Status: SHIPPED | OUTPATIENT
Start: 2018-06-18 | End: 2018-12-24 | Stop reason: SDUPTHER

## 2018-08-22 ENCOUNTER — NURSE ONLY (OUTPATIENT)
Dept: CARDIOLOGY CLINIC | Age: 78
End: 2018-08-22
Payer: MEDICARE

## 2018-08-22 DIAGNOSIS — Z95.810 ICD (IMPLANTABLE CARDIOVERTER-DEFIBRILLATOR), BIVENTRICULAR, IN SITU: ICD-10-CM

## 2018-08-22 PROCEDURE — 93284 PRGRMG EVAL IMPLANTABLE DFB: CPT | Performed by: INTERNAL MEDICINE

## 2018-11-27 ENCOUNTER — OFFICE VISIT (OUTPATIENT)
Dept: CARDIOLOGY CLINIC | Age: 78
End: 2018-11-27
Payer: MEDICARE

## 2018-11-27 VITALS
SYSTOLIC BLOOD PRESSURE: 120 MMHG | HEIGHT: 72 IN | HEART RATE: 60 BPM | WEIGHT: 192.6 LBS | BODY MASS INDEX: 26.09 KG/M2 | DIASTOLIC BLOOD PRESSURE: 70 MMHG

## 2018-11-27 DIAGNOSIS — I48.20 CHRONIC ATRIAL FIBRILLATION (HCC): ICD-10-CM

## 2018-11-27 DIAGNOSIS — I42.0 DILATED CARDIOMYOPATHY (HCC): Primary | ICD-10-CM

## 2018-11-27 DIAGNOSIS — Z95.810 ICD (IMPLANTABLE CARDIOVERTER-DEFIBRILLATOR) IN PLACE: ICD-10-CM

## 2018-11-27 PROCEDURE — G8419 CALC BMI OUT NRM PARAM NOF/U: HCPCS | Performed by: NUCLEAR MEDICINE

## 2018-11-27 PROCEDURE — 1123F ACP DISCUSS/DSCN MKR DOCD: CPT | Performed by: NUCLEAR MEDICINE

## 2018-11-27 PROCEDURE — 99213 OFFICE O/P EST LOW 20 MIN: CPT | Performed by: NUCLEAR MEDICINE

## 2018-11-27 PROCEDURE — G8484 FLU IMMUNIZE NO ADMIN: HCPCS | Performed by: NUCLEAR MEDICINE

## 2018-11-27 PROCEDURE — G8598 ASA/ANTIPLAT THER USED: HCPCS | Performed by: NUCLEAR MEDICINE

## 2018-11-27 PROCEDURE — 1101F PT FALLS ASSESS-DOCD LE1/YR: CPT | Performed by: NUCLEAR MEDICINE

## 2018-11-27 PROCEDURE — 4040F PNEUMOC VAC/ADMIN/RCVD: CPT | Performed by: NUCLEAR MEDICINE

## 2018-11-27 PROCEDURE — G8427 DOCREV CUR MEDS BY ELIG CLIN: HCPCS | Performed by: NUCLEAR MEDICINE

## 2018-11-27 PROCEDURE — 1036F TOBACCO NON-USER: CPT | Performed by: NUCLEAR MEDICINE

## 2018-11-27 NOTE — PROGRESS NOTES
Cancer Brother         Brother had throat cancer.  Heart Disease Brother         Another brother had heart disease.  Pacemaker Brother      Social History   Substance Use Topics    Smoking status: Former Smoker     Types: Cigarettes     Quit date: 1/1/1989    Smokeless tobacco: Former User     Types: Chew      Comment: Patient states, \"he smokes a pipe occasionally.  Alcohol use No      Current Outpatient Prescriptions   Medication Sig Dispense Refill    sacubitril-valsartan (ENTRESTO) 24-26 MG per tablet Take 1 tablet by mouth 2 times daily Lot   4/20 112 tablet 0    furosemide (LASIX) 40 MG tablet TAKE 1 TABLET BY MOUTH TWO  TIMES DAILY 180 tablet 1    sacubitril-valsartan (ENTRESTO) 24-26 MG per tablet Take 1 tablet by mouth 2 times daily 60 tablet 0    warfarin (COUMADIN) 5 MG tablet Take 5 mg by mouth every evening      DIGOX 125 MCG tablet TAKE 1 TABLET BY MOUTH  DAILY 90 tablet 3    metoprolol tartrate (LOPRESSOR) 25 MG tablet TAKE ONE-HALF TABLET BY  MOUTH TWICE A DAY 90 tablet 3    Omega-3 Fatty Acids (FISH OIL) 1000 MG CAPS Take 2,000 mg by mouth 2 times daily      potassium chloride SA (K-DUR;KLOR-CON) 20 MEQ tablet Take 20 mEq by mouth 2 times daily       therapeutic multivitamin-minerals (THERAGRAN-M) tablet Take 1 tablet by mouth daily. No current facility-administered medications for this visit.       No Known Allergies  Health Maintenance   Topic Date Due    DTaP/Tdap/Td vaccine (1 - Tdap) 06/26/1957    Shingles Vaccine (1 of 2 - 2 Dose Series) 06/26/1988    Pneumococcal low/med risk (1 of 2 - PCV13) 06/26/2003    Flu vaccine (1) 09/01/2018    Potassium monitoring  05/04/2019    Creatinine monitoring  05/04/2019       Subjective:  Review of Systems  General:   No fever, no chills, No fatigue or weight loss  Pulmonary:    some dyspnea, no wheezing  Cardiac:    Denies recent chest pain,   GI:     No nausea or vomiting, no abdominal pain  Neuro:    No dizziness or

## 2018-11-28 ENCOUNTER — PROCEDURE VISIT (OUTPATIENT)
Dept: CARDIOLOGY CLINIC | Age: 78
End: 2018-11-28
Payer: MEDICARE

## 2018-11-28 DIAGNOSIS — Z95.810 ICD (IMPLANTABLE CARDIOVERTER-DEFIBRILLATOR), BIVENTRICULAR, IN SITU: Primary | ICD-10-CM

## 2018-11-28 PROCEDURE — 93295 DEV INTERROG REMOTE 1/2/MLT: CPT | Performed by: INTERNAL MEDICINE

## 2018-11-28 PROCEDURE — 93296 REM INTERROG EVL PM/IDS: CPT | Performed by: INTERNAL MEDICINE

## 2018-12-24 RX ORDER — FUROSEMIDE 40 MG/1
TABLET ORAL
Qty: 180 TABLET | Refills: 2 | Status: SHIPPED | OUTPATIENT
Start: 2018-12-24 | End: 2019-05-29 | Stop reason: SDUPTHER

## 2019-01-02 ENCOUNTER — PROCEDURE VISIT (OUTPATIENT)
Dept: CARDIOLOGY CLINIC | Age: 79
End: 2019-01-02
Payer: MEDICARE

## 2019-01-02 DIAGNOSIS — I50.43 ACUTE ON CHRONIC COMBINED SYSTOLIC AND DIASTOLIC CONGESTIVE HEART FAILURE (HCC): Primary | ICD-10-CM

## 2019-01-02 PROCEDURE — 93299 PR REM INTERROG ICPMS/SCRMS <30 D TECH REVIEW: CPT | Performed by: INTERNAL MEDICINE

## 2019-01-02 PROCEDURE — 93297 REM INTERROG DEV EVAL ICPMS: CPT | Performed by: INTERNAL MEDICINE

## 2019-01-14 ENCOUNTER — TELEPHONE (OUTPATIENT)
Dept: CARE COORDINATION | Age: 79
End: 2019-01-14

## 2019-01-16 ENCOUNTER — TELEPHONE (OUTPATIENT)
Dept: CARE COORDINATION | Age: 79
End: 2019-01-16

## 2019-02-12 ENCOUNTER — PROCEDURE VISIT (OUTPATIENT)
Dept: CARDIOLOGY CLINIC | Age: 79
End: 2019-02-12
Payer: MEDICARE

## 2019-02-12 DIAGNOSIS — I50.43 ACUTE ON CHRONIC COMBINED SYSTOLIC AND DIASTOLIC CONGESTIVE HEART FAILURE (HCC): Primary | ICD-10-CM

## 2019-02-12 PROCEDURE — 93299 PR REM INTERROG ICPMS/SCRMS <30 D TECH REVIEW: CPT | Performed by: INTERNAL MEDICINE

## 2019-02-12 PROCEDURE — 93297 REM INTERROG DEV EVAL ICPMS: CPT | Performed by: INTERNAL MEDICINE

## 2019-02-28 RX ORDER — DIGOXIN 125 MCG
TABLET ORAL
Qty: 90 TABLET | Refills: 1 | Status: SHIPPED | OUTPATIENT
Start: 2019-02-28 | End: 2019-05-29 | Stop reason: SDUPTHER

## 2019-03-19 ENCOUNTER — PROCEDURE VISIT (OUTPATIENT)
Dept: CARDIOLOGY CLINIC | Age: 79
End: 2019-03-19
Payer: MEDICARE

## 2019-03-19 DIAGNOSIS — Z95.810 ICD (IMPLANTABLE CARDIOVERTER-DEFIBRILLATOR), BIVENTRICULAR, IN SITU: Primary | ICD-10-CM

## 2019-03-19 PROCEDURE — 93295 DEV INTERROG REMOTE 1/2/MLT: CPT | Performed by: INTERNAL MEDICINE

## 2019-03-19 PROCEDURE — 93296 REM INTERROG EVL PM/IDS: CPT | Performed by: INTERNAL MEDICINE

## 2019-04-26 ENCOUNTER — PROCEDURE VISIT (OUTPATIENT)
Dept: CARDIOLOGY CLINIC | Age: 79
End: 2019-04-26
Payer: MEDICARE

## 2019-04-26 DIAGNOSIS — I50.43 ACUTE ON CHRONIC COMBINED SYSTOLIC AND DIASTOLIC CONGESTIVE HEART FAILURE (HCC): Primary | ICD-10-CM

## 2019-04-26 PROCEDURE — 93297 REM INTERROG DEV EVAL ICPMS: CPT | Performed by: NUCLEAR MEDICINE

## 2019-04-26 PROCEDURE — 93299 PR REM INTERROG ICPMS/SCRMS <30 D TECH REVIEW: CPT | Performed by: NUCLEAR MEDICINE

## 2019-05-29 RX ORDER — DIGOXIN 125 MCG
TABLET ORAL
Qty: 90 TABLET | Refills: 0 | Status: SHIPPED | OUTPATIENT
Start: 2019-05-29 | End: 2019-10-17 | Stop reason: SDUPTHER

## 2019-05-29 RX ORDER — FUROSEMIDE 40 MG/1
TABLET ORAL
Qty: 180 TABLET | Refills: 0 | Status: SHIPPED | OUTPATIENT
Start: 2019-05-29 | End: 2019-10-17 | Stop reason: SDUPTHER

## 2019-06-04 ENCOUNTER — PROCEDURE VISIT (OUTPATIENT)
Dept: CARDIOLOGY CLINIC | Age: 79
End: 2019-06-04
Payer: MEDICARE

## 2019-06-04 DIAGNOSIS — I50.43 ACUTE ON CHRONIC COMBINED SYSTOLIC AND DIASTOLIC CONGESTIVE HEART FAILURE (HCC): Primary | ICD-10-CM

## 2019-06-04 PROCEDURE — 93299 PR REM INTERROG ICPMS/SCRMS <30 D TECH REVIEW: CPT | Performed by: NUCLEAR MEDICINE

## 2019-06-04 PROCEDURE — 93297 REM INTERROG DEV EVAL ICPMS: CPT | Performed by: NUCLEAR MEDICINE

## 2019-07-10 ENCOUNTER — TELEPHONE (OUTPATIENT)
Dept: CARDIOLOGY CLINIC | Age: 79
End: 2019-07-10

## 2019-07-10 NOTE — TELEPHONE ENCOUNTER
SPOKE WITH PATIENT'S WIFE AND SHE STATES THEY GET THE ENTRESTO FREE THROUGH THE COMPANY. INFORMATION GIVEN TO Samira Jacobs.

## 2019-07-23 ENCOUNTER — OFFICE VISIT (OUTPATIENT)
Dept: CARDIOLOGY CLINIC | Age: 79
End: 2019-07-23
Payer: MEDICARE

## 2019-07-23 VITALS
DIASTOLIC BLOOD PRESSURE: 60 MMHG | WEIGHT: 184.3 LBS | BODY MASS INDEX: 24.43 KG/M2 | HEIGHT: 73 IN | HEART RATE: 64 BPM | SYSTOLIC BLOOD PRESSURE: 122 MMHG

## 2019-07-23 DIAGNOSIS — I48.20 CHRONIC ATRIAL FIBRILLATION (HCC): Primary | ICD-10-CM

## 2019-07-23 DIAGNOSIS — I42.0 DILATED CARDIOMYOPATHY (HCC): ICD-10-CM

## 2019-07-23 PROCEDURE — G8420 CALC BMI NORM PARAMETERS: HCPCS | Performed by: NUCLEAR MEDICINE

## 2019-07-23 PROCEDURE — 4040F PNEUMOC VAC/ADMIN/RCVD: CPT | Performed by: NUCLEAR MEDICINE

## 2019-07-23 PROCEDURE — 1036F TOBACCO NON-USER: CPT | Performed by: NUCLEAR MEDICINE

## 2019-07-23 PROCEDURE — G8427 DOCREV CUR MEDS BY ELIG CLIN: HCPCS | Performed by: NUCLEAR MEDICINE

## 2019-07-23 PROCEDURE — 1123F ACP DISCUSS/DSCN MKR DOCD: CPT | Performed by: NUCLEAR MEDICINE

## 2019-07-23 PROCEDURE — 99213 OFFICE O/P EST LOW 20 MIN: CPT | Performed by: NUCLEAR MEDICINE

## 2019-07-31 ENCOUNTER — PROCEDURE VISIT (OUTPATIENT)
Dept: CARDIOLOGY CLINIC | Age: 79
End: 2019-07-31
Payer: MEDICARE

## 2019-07-31 DIAGNOSIS — Z95.810 ICD (IMPLANTABLE CARDIOVERTER-DEFIBRILLATOR), BIVENTRICULAR, IN SITU: Primary | ICD-10-CM

## 2019-07-31 PROCEDURE — 93295 DEV INTERROG REMOTE 1/2/MLT: CPT | Performed by: INTERNAL MEDICINE

## 2019-07-31 PROCEDURE — 93296 REM INTERROG EVL PM/IDS: CPT | Performed by: INTERNAL MEDICINE

## 2019-09-11 ENCOUNTER — NURSE ONLY (OUTPATIENT)
Dept: CARDIOLOGY CLINIC | Age: 79
End: 2019-09-11
Payer: MEDICARE

## 2019-09-11 DIAGNOSIS — Z95.810 ICD (IMPLANTABLE CARDIOVERTER-DEFIBRILLATOR), BIVENTRICULAR, IN SITU: Primary | ICD-10-CM

## 2019-09-11 PROCEDURE — 93284 PRGRMG EVAL IMPLANTABLE DFB: CPT | Performed by: INTERNAL MEDICINE

## 2019-10-17 RX ORDER — DIGOXIN 125 MCG
TABLET ORAL
Qty: 90 TABLET | Refills: 2 | Status: SHIPPED | OUTPATIENT
Start: 2019-10-17 | End: 2020-07-21 | Stop reason: SDUPTHER

## 2019-10-17 RX ORDER — FUROSEMIDE 40 MG/1
TABLET ORAL
Qty: 180 TABLET | Refills: 2 | Status: SHIPPED | OUTPATIENT
Start: 2019-10-17 | End: 2020-07-01

## 2019-10-22 ENCOUNTER — PROCEDURE VISIT (OUTPATIENT)
Dept: CARDIOLOGY CLINIC | Age: 79
End: 2019-10-22
Payer: MEDICARE

## 2019-10-22 DIAGNOSIS — I50.43 ACUTE ON CHRONIC COMBINED SYSTOLIC AND DIASTOLIC CONGESTIVE HEART FAILURE (HCC): Primary | ICD-10-CM

## 2019-10-22 DIAGNOSIS — Z95.810 ICD (IMPLANTABLE CARDIOVERTER-DEFIBRILLATOR), BIVENTRICULAR, IN SITU: ICD-10-CM

## 2019-10-22 PROCEDURE — 93297 REM INTERROG DEV EVAL ICPMS: CPT | Performed by: NUCLEAR MEDICINE

## 2019-10-22 PROCEDURE — 93299 PR REM INTERROG ICPMS/SCRMS <30 D TECH REVIEW: CPT | Performed by: NUCLEAR MEDICINE

## 2019-10-22 RX ORDER — POTASSIUM CHLORIDE 20 MEQ/1
20 TABLET, EXTENDED RELEASE ORAL 2 TIMES DAILY
Qty: 180 TABLET | Refills: 3 | Status: SHIPPED | OUTPATIENT
Start: 2019-10-22 | End: 2020-07-21 | Stop reason: SDUPTHER

## 2019-12-02 ENCOUNTER — PROCEDURE VISIT (OUTPATIENT)
Dept: CARDIOLOGY CLINIC | Age: 79
End: 2019-12-02
Payer: MEDICARE

## 2019-12-02 DIAGNOSIS — I48.91 ATRIAL FIBRILLATION, UNSPECIFIED TYPE (HCC): Primary | ICD-10-CM

## 2019-12-02 DIAGNOSIS — I50.43 ACUTE ON CHRONIC COMBINED SYSTOLIC AND DIASTOLIC CONGESTIVE HEART FAILURE (HCC): Primary | ICD-10-CM

## 2019-12-02 DIAGNOSIS — I42.8 NON-ISCHEMIC CARDIOMYOPATHY (HCC): ICD-10-CM

## 2019-12-02 PROCEDURE — 93299 PR REM INTERROG ICPMS/SCRMS <30 D TECH REVIEW: CPT | Performed by: NUCLEAR MEDICINE

## 2019-12-02 PROCEDURE — 93297 REM INTERROG DEV EVAL ICPMS: CPT | Performed by: NUCLEAR MEDICINE

## 2019-12-03 LAB
BUN BLDV-MCNC: 16 MG/DL
CALCIUM SERPL-MCNC: 9.6 MG/DL
CHLORIDE BLD-SCNC: 102 MMOL/L
CO2: 31 MMOL/L
CREAT SERPL-MCNC: 1.1 MG/DL
GFR CALCULATED: NORMAL
GLUCOSE BLD-MCNC: 101 MG/DL
MAGNESIUM: 2.3 MG/DL
POTASSIUM SERPL-SCNC: 3.6 MMOL/L
SODIUM BLD-SCNC: 146 MMOL/L

## 2019-12-05 RX ORDER — AMIODARONE HYDROCHLORIDE 200 MG/1
TABLET ORAL
Qty: 180 TABLET | Refills: 3 | Status: SHIPPED | OUTPATIENT
Start: 2019-12-05 | End: 2019-12-30 | Stop reason: SDUPTHER

## 2019-12-13 LAB
A/G RATIO: ABNORMAL
ALBUMIN SERPL-MCNC: 4.4 G/DL
ALP BLD-CCNC: 111 U/L
ALT SERPL-CCNC: 13 U/L
AST SERPL-CCNC: 31 U/L
BILIRUB SERPL-MCNC: 2 MG/DL (ref 0.1–1.4)
BILIRUBIN DIRECT: 0.4 MG/DL
BILIRUBIN, INDIRECT: 1.6
GLOBULIN: ABNORMAL
PROTEIN TOTAL: 6.9 G/DL

## 2019-12-16 ENCOUNTER — TELEPHONE (OUTPATIENT)
Dept: CARDIOLOGY CLINIC | Age: 79
End: 2019-12-16

## 2019-12-30 RX ORDER — AMIODARONE HYDROCHLORIDE 200 MG/1
TABLET ORAL
Qty: 90 TABLET | Refills: 3 | Status: SHIPPED | OUTPATIENT
Start: 2019-12-30 | End: 2020-07-21 | Stop reason: SDUPTHER

## 2020-01-10 ENCOUNTER — PROCEDURE VISIT (OUTPATIENT)
Dept: CARDIOLOGY CLINIC | Age: 80
End: 2020-01-10
Payer: MEDICARE

## 2020-01-10 PROCEDURE — 93295 DEV INTERROG REMOTE 1/2/MLT: CPT | Performed by: INTERNAL MEDICINE

## 2020-01-10 PROCEDURE — 93296 REM INTERROG EVL PM/IDS: CPT | Performed by: INTERNAL MEDICINE

## 2020-02-19 ENCOUNTER — PROCEDURE VISIT (OUTPATIENT)
Dept: CARDIOLOGY CLINIC | Age: 80
End: 2020-02-19
Payer: MEDICARE

## 2020-02-19 PROCEDURE — 93297 REM INTERROG DEV EVAL ICPMS: CPT | Performed by: NUCLEAR MEDICINE

## 2020-03-25 ENCOUNTER — PROCEDURE VISIT (OUTPATIENT)
Dept: CARDIOLOGY CLINIC | Age: 80
End: 2020-03-25
Payer: MEDICARE

## 2020-03-25 PROCEDURE — G2066 INTER DEVC REMOTE 30D: HCPCS | Performed by: NUCLEAR MEDICINE

## 2020-03-25 PROCEDURE — 93297 REM INTERROG DEV EVAL ICPMS: CPT | Performed by: NUCLEAR MEDICINE

## 2020-04-11 NOTE — TELEPHONE ENCOUNTER
Okay   He is scheduled for a stress test and echo   Will decide after that
Settin mm/s          Site:         aortic sinuses          Stroke Volume:         194 mL          Stroke Volume Index:         89.81 mL/m2          Heart Rate at Acquisition:         50          Peak Velocity:         1.40 m/s    PHASE CONTRAST         MV Study Technical Adequacy:     good                 Mitral Insufficiency:   none                 E-Wave:                .3                 A-Wave:                .4              MV Prosthetic Velocities:      PHYSICIANS            Attending Physician:        Yogesh Sanchez,,M.D.            I have personally performed and/or personally supervised and was  present for this entire procedure, including the         review and  interpretation of all images and physiologic tracings acquired during the  course of this study.         The medications listed have been ordered by me and administered  under my direct supervision.            MR Lab Attending Physician:  _____________________________  Truong            Report approved by Lopez     The signed  original of this report is on file within the department. Status Results Details       Notes/Results Only on 10/2/2007  [address]\&#39;s [facility]\")' [email or web address]">[place] Summary
No

## 2020-04-29 ENCOUNTER — PROCEDURE VISIT (OUTPATIENT)
Dept: CARDIOLOGY CLINIC | Age: 80
End: 2020-04-29
Payer: MEDICARE

## 2020-04-29 PROCEDURE — 93295 DEV INTERROG REMOTE 1/2/MLT: CPT | Performed by: INTERNAL MEDICINE

## 2020-04-29 PROCEDURE — 93296 REM INTERROG EVL PM/IDS: CPT | Performed by: INTERNAL MEDICINE

## 2020-05-06 ENCOUNTER — NURSE ONLY (OUTPATIENT)
Dept: CARDIOLOGY CLINIC | Age: 80
End: 2020-05-06
Payer: MEDICARE

## 2020-05-06 PROCEDURE — 93284 PRGRMG EVAL IMPLANTABLE DFB: CPT | Performed by: INTERNAL MEDICINE

## 2020-06-03 ENCOUNTER — PROCEDURE VISIT (OUTPATIENT)
Dept: CARDIOLOGY CLINIC | Age: 80
End: 2020-06-03
Payer: MEDICARE

## 2020-06-03 PROCEDURE — 93297 REM INTERROG DEV EVAL ICPMS: CPT | Performed by: INTERNAL MEDICINE

## 2020-06-03 PROCEDURE — G2066 INTER DEVC REMOTE 30D: HCPCS | Performed by: INTERNAL MEDICINE

## 2020-07-01 RX ORDER — FUROSEMIDE 40 MG/1
TABLET ORAL
Qty: 180 TABLET | Refills: 0 | Status: SHIPPED | OUTPATIENT
Start: 2020-07-01 | End: 2020-07-21 | Stop reason: SDUPTHER

## 2020-07-08 ENCOUNTER — PROCEDURE VISIT (OUTPATIENT)
Dept: CARDIOLOGY CLINIC | Age: 80
End: 2020-07-08
Payer: MEDICARE

## 2020-07-08 PROCEDURE — 93297 REM INTERROG DEV EVAL ICPMS: CPT | Performed by: NUCLEAR MEDICINE

## 2020-07-08 PROCEDURE — G2066 INTER DEVC REMOTE 30D: HCPCS | Performed by: NUCLEAR MEDICINE

## 2020-07-21 ENCOUNTER — OFFICE VISIT (OUTPATIENT)
Dept: CARDIOLOGY CLINIC | Age: 80
End: 2020-07-21
Payer: MEDICARE

## 2020-07-21 VITALS
HEART RATE: 88 BPM | SYSTOLIC BLOOD PRESSURE: 126 MMHG | DIASTOLIC BLOOD PRESSURE: 72 MMHG | HEIGHT: 72 IN | BODY MASS INDEX: 25.19 KG/M2 | WEIGHT: 186 LBS

## 2020-07-21 PROCEDURE — 99213 OFFICE O/P EST LOW 20 MIN: CPT | Performed by: NUCLEAR MEDICINE

## 2020-07-21 PROCEDURE — G8417 CALC BMI ABV UP PARAM F/U: HCPCS | Performed by: NUCLEAR MEDICINE

## 2020-07-21 PROCEDURE — 4040F PNEUMOC VAC/ADMIN/RCVD: CPT | Performed by: NUCLEAR MEDICINE

## 2020-07-21 PROCEDURE — 1123F ACP DISCUSS/DSCN MKR DOCD: CPT | Performed by: NUCLEAR MEDICINE

## 2020-07-21 PROCEDURE — 1036F TOBACCO NON-USER: CPT | Performed by: NUCLEAR MEDICINE

## 2020-07-21 PROCEDURE — G8427 DOCREV CUR MEDS BY ELIG CLIN: HCPCS | Performed by: NUCLEAR MEDICINE

## 2020-07-21 RX ORDER — DIGOXIN 125 MCG
TABLET ORAL
Qty: 90 TABLET | Refills: 3 | Status: SHIPPED | OUTPATIENT
Start: 2020-07-21 | End: 2020-12-15 | Stop reason: SDUPTHER

## 2020-07-21 RX ORDER — POTASSIUM CHLORIDE 20 MEQ/1
20 TABLET, EXTENDED RELEASE ORAL 2 TIMES DAILY
Qty: 180 TABLET | Refills: 3 | Status: SHIPPED | OUTPATIENT
Start: 2020-07-21 | End: 2020-12-15 | Stop reason: SDUPTHER

## 2020-07-21 RX ORDER — DULOXETIN HYDROCHLORIDE 20 MG/1
20 CAPSULE, DELAYED RELEASE ORAL 2 TIMES DAILY
COMMUNITY
End: 2021-01-07

## 2020-07-21 RX ORDER — TAMSULOSIN HYDROCHLORIDE 0.4 MG/1
0.4 CAPSULE ORAL 2 TIMES DAILY
COMMUNITY
End: 2022-01-01

## 2020-07-21 RX ORDER — SACUBITRIL AND VALSARTAN 24; 26 MG/1; MG/1
1 TABLET, FILM COATED ORAL 2 TIMES DAILY
Qty: 180 TABLET | Refills: 3 | Status: SHIPPED | OUTPATIENT
Start: 2020-07-21 | End: 2020-10-07 | Stop reason: SDUPTHER

## 2020-07-21 RX ORDER — FUROSEMIDE 40 MG/1
TABLET ORAL
Qty: 180 TABLET | Refills: 3 | Status: SHIPPED | OUTPATIENT
Start: 2020-07-21 | End: 2020-12-15 | Stop reason: SDUPTHER

## 2020-07-21 RX ORDER — AMIODARONE HYDROCHLORIDE 200 MG/1
TABLET ORAL
Qty: 90 TABLET | Refills: 3 | Status: SHIPPED | OUTPATIENT
Start: 2020-07-21 | End: 2020-12-15 | Stop reason: SDUPTHER

## 2020-07-21 RX ORDER — FINASTERIDE 5 MG/1
5 TABLET, FILM COATED ORAL DAILY
COMMUNITY
Start: 2020-05-13 | End: 2022-01-01 | Stop reason: ALTCHOICE

## 2020-07-21 NOTE — PROGRESS NOTES
[address] [place] [address]  Dept: [phone]  Dept Fax: [phone]  Loc: [phone]    Visit Date: 7/21/2020    Jamie Camilo is a 78 y.o. male who presents todayfor:  Chief Complaint   Patient presents with    Check-Up    Atrial Fibrillation    Cardiomyopathy    Hypertension     Known CMP and ICD  No ICD shocks  No chest pain   No changes in breathing  Runs lower BP  Some dizziness  No syncope  A fib is stable      HPI:  HPI  Past Medical History:   Diagnosis Date    Arthritis     Atrial fibrillation [facility])     [employer] single ICD 1/6/2016    Cardiomyopathy ([address]     Cardiomyopathy, likely nonischemic based on the patient's description. However, don't have a cardiac cath report from [facility].  Hypertension     ICD (implantable cardiac defibrillator) battery depletion: 9/24/2013: Generator replacement using [employer] 9/24/2013 9/24/2013: Generator replacement using [employer] device. Atrial lead was capped. Dr. Susan Gomez ICD (implantable cardiac defibrillator), dual, in situ     [employer] dual ICD    Low blood pressure     Low blood pressure with rapid atrial fibrillation.  Prolonged emergence from general anesthesia       Past Surgical History:   Procedure Laterality Date    CHOLECYSTECTOMY      COLONOSCOPY      DOPPLER ECHOCARDIOGRAPHY  7 08 2010    Global LV dilatation and dysfunction. EF 35%. Moderate biatrial enlargement. Mild mitral regurgitation and mild tricuspid regurgitation. No obvious stenotic valves. Borderline to mild pulmonary hypertension. No pericardial effusion.     HAND SURGERY  06/26/2017   [employer] HERNIA REPAIR  [address] [place], [place].    OTHER SURGICAL HISTORY Left 01/20/2017    Left CMC arthroplasty    PACEMAKER PLACEMENT      UPPER GASTROINTESTINAL ENDOSCOPY  12/2016    with dilation in [place] [facility]     Family History   Problem Relation Age of Onset    Alzheimer's 09/01/2020    Potassium monitoring  12/03/2020    Creatinine monitoring  12/03/2020    TSH testing  12/13/2020    Hepatitis A vaccine  Aged Out    Hepatitis B vaccine  Aged Out    Hib vaccine  Aged Out    Meningococcal (ACWY) vaccine  Aged Out       Subjective:  Review of Systems  General:   No fever, no chills, No fatigue or weight loss  Pulmonary:    some dyspnea, no wheezing  Cardiac:    Denies recent chest pain,   GI:     No nausea or vomiting, no abdominal pain  Neuro:     No dizziness or light headedness,   Musculoskeletal:  No recent active issues  Extremities:   No edema, no obvious claudication       Objective:  Physical Exam  /72   Pulse 88   Ht 6' (1.829 m)   Wt 186 lb (84.4 kg)   BMI 25.23 kg/m²   General:   Well developed, well nourished  Lungs:    Clear to auscultation  Heart:    Normal S1 S2, Slight murmur. no rubs, no gallops  Abdomen:   Soft, non tender, no organomegalies, positive bowel sounds  Extremities:   No edema, no cyanosis, good peripheral pulses  Neurological:   Awake, alert, oriented. No obvious focal deficits  Musculoskelatal:  No obvious deformities    Assessment:      Diagnosis Orders   1. Dilated cardiomyopathy ([address]     2. ICD (implantable cardioverter-defibrillator) in place     3. Permanent atrial fibrillation     cardiac fair for now    Plan:  No follow-ups on file. As above  Continue risk factor modification and medical management  Thank you for allowing me to participate in the care of your patient. Please don't hesitate to contact me regarding any further issues related to the patient care    Orders Placed:  No orders of the defined types were placed in this encounter. Medications Prescribed:  No orders of the defined types were placed in this encounter. Discussed use, benefit, and side effects of prescribed medications. All patient questions answered. Pt voicedunderstanding. Instructed to continue current medications, diet and exercise.  Continue

## 2020-08-12 ENCOUNTER — PROCEDURE VISIT (OUTPATIENT)
Dept: CARDIOLOGY CLINIC | Age: 80
End: 2020-08-12
Payer: MEDICARE

## 2020-08-12 PROCEDURE — 93296 REM INTERROG EVL PM/IDS: CPT | Performed by: NUCLEAR MEDICINE

## 2020-08-12 PROCEDURE — 93295 DEV INTERROG REMOTE 1/2/MLT: CPT | Performed by: NUCLEAR MEDICINE

## 2020-08-12 NOTE — PROGRESS NOTES
[facility] biv icd     Programmed VVIR     . Aura Lennon Battery longevity:  3.2-3.5 years   Presenting rhythm  At fib biv paced   corvue WNL    Atrial impedance 350  RV impedance 260  LV imped 380  Shock 47    P wave sensing 0.2 at fib   R wave sensing 11.9    NA  % atrial paced  100 % RV paced     RV threshold 0.75 V at 0.5ms  LV threshold not perfomed

## 2020-09-16 ENCOUNTER — PROCEDURE VISIT (OUTPATIENT)
Dept: CARDIOLOGY CLINIC | Age: 80
End: 2020-09-16
Payer: MEDICARE

## 2020-09-16 PROCEDURE — 93297 REM INTERROG DEV EVAL ICPMS: CPT | Performed by: NUCLEAR MEDICINE

## 2020-09-16 PROCEDURE — G2066 INTER DEVC REMOTE 30D: HCPCS | Performed by: NUCLEAR MEDICINE

## 2020-10-06 ENCOUNTER — TELEPHONE (OUTPATIENT)
Dept: CARDIOLOGY CLINIC | Age: 80
End: 2020-10-06

## 2020-10-06 ENCOUNTER — NURSE ONLY (OUTPATIENT)
Dept: CARDIOLOGY CLINIC | Age: 80
End: 2020-10-06
Payer: MEDICARE

## 2020-10-06 PROCEDURE — 93283 PRGRMG EVAL IMPLANTABLE DFB: CPT | Performed by: NUCLEAR MEDICINE

## 2020-10-06 NOTE — PROGRESS NOTES
St biv icd in office   VVIR chronic at [facility]   3.1-3.4 years on device       Presenting rhythm  biv paced  Atrial impedance 380  RV impedance 260  LV imped 390  Shock 51    P wave sensing 0.2 at [facility]   R wave sensing none today, he's dependent   Device measured 11.2      99 % RV paced       RV threshold 0.625 V at 0.5ms  LV threshold 2.25 @ 0.6  heather MARTINL

## 2020-10-07 RX ORDER — SACUBITRIL AND VALSARTAN 24; 26 MG/1; MG/1
1 TABLET, FILM COATED ORAL 2 TIMES DAILY
Qty: 180 TABLET | Refills: 3 | Status: SHIPPED | OUTPATIENT
Start: 2020-10-07 | End: 2020-12-15 | Stop reason: SDUPTHER

## 2020-11-09 ENCOUNTER — TELEPHONE (OUTPATIENT)
Dept: CARDIOLOGY CLINIC | Age: 80
End: 2020-11-09

## 2020-11-09 NOTE — TELEPHONE ENCOUNTER
[employer] faxed interaction between digoxin and amiodarone , decreased digoxin and may cause digoxin toxicity. See attached. Dr. Lenard Maguire advise?

## 2020-11-11 ENCOUNTER — PROCEDURE VISIT (OUTPATIENT)
Dept: CARDIOLOGY CLINIC | Age: 80
End: 2020-11-11
Payer: MEDICARE

## 2020-11-11 PROCEDURE — 93297 REM INTERROG DEV EVAL ICPMS: CPT | Performed by: NUCLEAR MEDICINE

## 2020-11-11 PROCEDURE — G2066 INTER DEVC REMOTE 30D: HCPCS | Performed by: NUCLEAR MEDICINE

## 2020-11-11 NOTE — PROGRESS NOTES
[address] [facility] REMOTE   BATTERY 3-3.3 YRS REMAINING  KNOWN AFIB /COUMADIN   AFIB BURDEN 31%   CAITLIN MARTINL

## 2020-12-15 ENCOUNTER — OFFICE VISIT (OUTPATIENT)
Dept: CARDIOLOGY CLINIC | Age: 80
End: 2020-12-15
Payer: MEDICARE

## 2020-12-15 VITALS
WEIGHT: 180 LBS | SYSTOLIC BLOOD PRESSURE: 134 MMHG | HEART RATE: 68 BPM | DIASTOLIC BLOOD PRESSURE: 84 MMHG | BODY MASS INDEX: 24.38 KG/M2 | HEIGHT: 72 IN

## 2020-12-15 LAB
BASOPHILS ABSOLUTE: 0 /ΜL
BASOPHILS RELATIVE PERCENT: 0.3 %
BUN BLDV-MCNC: 10 MG/DL
CALCIUM SERPL-MCNC: 8.6 MG/DL
CHLORIDE BLD-SCNC: 100 MMOL/L
CO2: 30 MMOL/L
CREAT SERPL-MCNC: 1.2 MG/DL
EOSINOPHILS ABSOLUTE: 0.1 /ΜL
EOSINOPHILS RELATIVE PERCENT: 2 %
GFR CALCULATED: 60
GLUCOSE BLD-MCNC: 106 MG/DL
HCT VFR BLD CALC: 38.9 % (ref 41–53)
HEMOGLOBIN: 12.6 G/DL (ref 13.5–17.5)
LYMPHOCYTES ABSOLUTE: 1 /ΜL
LYMPHOCYTES RELATIVE PERCENT: 27.2 %
MCH RBC QN AUTO: 31.2 PG
MCHC RBC AUTO-ENTMCNC: 32.4 G/DL
MCV RBC AUTO: 69.3 FL
MONOCYTES ABSOLUTE: 0.4 /ΜL
MONOCYTES RELATIVE PERCENT: 11 %
NEUTROPHILS ABSOLUTE: 2.1 /ΜL
NEUTROPHILS RELATIVE PERCENT: 58.7 %
PLATELET # BLD: 136 K/ΜL
PMV BLD AUTO: 9.9 FL
POTASSIUM SERPL-SCNC: 3.8 MMOL/L
RBC # BLD: 4.04 10^6/ΜL
SODIUM BLD-SCNC: 140 MMOL/L
WBC # BLD: 3.5 10^3/ML

## 2020-12-15 PROCEDURE — 1123F ACP DISCUSS/DSCN MKR DOCD: CPT | Performed by: NUCLEAR MEDICINE

## 2020-12-15 PROCEDURE — G8427 DOCREV CUR MEDS BY ELIG CLIN: HCPCS | Performed by: NUCLEAR MEDICINE

## 2020-12-15 PROCEDURE — G8420 CALC BMI NORM PARAMETERS: HCPCS | Performed by: NUCLEAR MEDICINE

## 2020-12-15 PROCEDURE — 1036F TOBACCO NON-USER: CPT | Performed by: NUCLEAR MEDICINE

## 2020-12-15 PROCEDURE — G8484 FLU IMMUNIZE NO ADMIN: HCPCS | Performed by: NUCLEAR MEDICINE

## 2020-12-15 PROCEDURE — 4040F PNEUMOC VAC/ADMIN/RCVD: CPT | Performed by: NUCLEAR MEDICINE

## 2020-12-15 PROCEDURE — 99214 OFFICE O/P EST MOD 30 MIN: CPT | Performed by: NUCLEAR MEDICINE

## 2020-12-15 RX ORDER — SACUBITRIL AND VALSARTAN 24; 26 MG/1; MG/1
1 TABLET, FILM COATED ORAL 2 TIMES DAILY
Qty: 180 TABLET | Refills: 3 | Status: SHIPPED | OUTPATIENT
Start: 2020-12-15 | End: 2021-05-18 | Stop reason: ALTCHOICE

## 2020-12-15 RX ORDER — POTASSIUM CHLORIDE 20 MEQ/1
20 TABLET, EXTENDED RELEASE ORAL 2 TIMES DAILY
Qty: 180 TABLET | Refills: 3 | Status: SHIPPED | OUTPATIENT
Start: 2020-12-15 | End: 2021-03-09

## 2020-12-15 RX ORDER — FUROSEMIDE 40 MG/1
TABLET ORAL
Qty: 180 TABLET | Refills: 3 | Status: SHIPPED | OUTPATIENT
Start: 2020-12-15 | End: 2021-03-01 | Stop reason: DRUGHIGH

## 2020-12-15 RX ORDER — DIGOXIN 125 MCG
TABLET ORAL
Qty: 90 TABLET | Refills: 3 | Status: SHIPPED | OUTPATIENT
Start: 2020-12-15 | End: 2021-05-18 | Stop reason: ALTCHOICE

## 2020-12-15 RX ORDER — AMIODARONE HYDROCHLORIDE 200 MG/1
TABLET ORAL
Qty: 90 TABLET | Refills: 3 | Status: SHIPPED | OUTPATIENT
Start: 2020-12-15 | End: 2022-01-01

## 2020-12-15 NOTE — PROGRESS NOTES
[address] [address] [facility]  Dept: [phone]  Dept Fax: [phone]  Loc: [phone]    Visit Date: 12/15/2020    Guy Crouch is a 78 y.o. male who presents todayfor:  Chief Complaint   Patient presents with    Check-Up    Atrial Fibrillation    Cardiomyopathy    Hypotension   had COVID lately   Still slow recovery   Very weak   Some dizziness  No syncope  A fib is fair   No RVR   Looks pale   No chest pain  Does have dyspnea  Looks pale  Some hematuria  No ICD shocks        HPI:  HPI  Past Medical History:   Diagnosis Date    Arthritis     Atrial fibrillation [facility])     [employer] single ICD 1/6/2016    Cardiomyopathy ([facility] [address]     Cardiomyopathy, likely nonischemic based on the patient's description. However, don't have a cardiac cath report from [email or web address].  Hypertension     ICD (implantable cardiac defibrillator) battery depletion: 9/24/2013: Generator replacement using [employer] 9/24/2013 9/24/2013: Generator replacement using [employer]IO device. Atrial lead was capped. Dr. Ulysses De Los Santos ICD (implantable cardiac defibrillator), dual, in situ     [employer] dual ICD    Low blood pressure     Low blood pressure with rapid atrial fibrillation.  Prolonged emergence from general anesthesia       Past Surgical History:   Procedure Laterality Date    CHOLECYSTECTOMY      COLONOSCOPY      DOPPLER ECHOCARDIOGRAPHY  7 08 2010    Global LV dilatation and dysfunction. EF 35%. Moderate biatrial enlargement. Mild mitral regurgitation and mild tricuspid regurgitation. No obvious stenotic valves. Borderline to mild pulmonary hypertension. No pericardial effusion.     HAND SURGERY  06/26/2017   [facility] HERNIA REPAIR  [address] [place], [place].    OTHER SURGICAL HISTORY Left 01/20/2017    Left CMC arthroplasty    PACEMAKER PLACEMENT      UPPER GASTROINTESTINAL ENDOSCOPY  12/2016    with dilation in [address] [place] Family History   Problem Relation Age of Onset    Alzheimer's Disease Father     Cancer Brother         Brother had throat cancer.  Heart Disease Brother         Another brother had heart disease.  Pacemaker Brother      Social History     Tobacco Use    Smoking status: Former Smoker     Types: Cigarettes     Quit date: 1989     Years since quittin.9    Smokeless tobacco: Former User     Types: Chew    Tobacco comment: Patient states, Cr Caputo smokes a pipe occasionally. Substance Use Topics    Alcohol use: No      Current Outpatient Medications   Medication Sig Dispense Refill    sacubitril-valsartan (ENTRESTO) 24-26 MG per tablet Take 1 tablet by mouth 2 times daily 180 tablet 3    DULoxetine (CYMBALTA) 20 MG extended release capsule Take 20 mg by mouth 2 times daily       finasteride (PROSCAR) 5 MG tablet Take 5 mg by mouth daily       tamsulosin (FLOMAX) 0.4 MG capsule Take 0.4 mg by mouth 2 times daily      digoxin (LANOXIN) 125 MCG tablet TAKE 1 TABLET BY MOUTH  DAILY 90 tablet 3    amiodarone (CORDARONE) 200 MG tablet Take 1 tablet daily. 90 tablet 3    furosemide (LASIX) 40 MG tablet TAKE 1 TABLET BY MOUTH TWO  TIMES DAILY 180 tablet 3    metoprolol tartrate (LOPRESSOR) 25 MG tablet TAKE ONE-HALF TABLET BY  MOUTH TWICE A DAY 90 tablet 3    potassium chloride (KLOR-CON M) 20 MEQ extended release tablet Take 1 tablet by mouth 2 times daily 180 tablet 3    warfarin (COUMADIN) 5 MG tablet Take 5 mg by mouth every evening      Omega-3 Fatty Acids (FISH OIL) 1000 MG CAPS Take 2,000 mg by mouth 2 times daily      therapeutic multivitamin-minerals (THERAGRAN-M) tablet Take 1 tablet by mouth daily. No current facility-administered medications for this visit.       No Known Allergies  Health Maintenance   Topic Date Due    DTaP/Tdap/Td vaccine (1 - Tdap) 1957    Shingles Vaccine (1 of 2) 1988    Pneumococcal 65+ years Vaccine (2 of 2 - PPSV23) 2003    Annual Wellness Visit (AWV)  06/23/2019    Flu vaccine (1) 09/01/2020    Potassium monitoring  12/03/2020    Creatinine monitoring  12/03/2020    TSH testing  12/13/2020    Hepatitis A vaccine  Aged Out    Hepatitis B vaccine  Aged Out    Hib vaccine  Aged Out    Meningococcal (ACWY) vaccine  Aged Out       Subjective:  Review of Systems  General:   No fever, no chills, some fatigue or weight loss  Pulmonary:    some dyspnea, no wheezing  Cardiac:    Denies recent chest pain,   GI:     No nausea or vomiting, no abdominal pain  Neuro:     No dizziness or light headedness,   Musculoskeletal:  No recent active issues  Extremities:   No edema, no obvious claudication       Objective:  Physical Exam  /84   Pulse 68   Ht 6' (1.829 m)   Wt 180 lb (81.6 kg) Comment: weight per patient  BMI 24.41 kg/m²   General:   Well developed, very pale   Lungs:    Clear to auscultation  Heart:    Normal S1 S2, Slight murmur. no rubs, no gallops  Abdomen:   Soft, non tender, no organomegalies, positive bowel sounds  Extremities:   No edema, no cyanosis, good peripheral pulses  Neurological:   Awake, alert, oriented. No obvious focal deficits  Musculoskelatal:  No obvious deformities    Assessment:      Diagnosis Orders   1. Coronary artery disease involving native coronary artery of native heart without angina pectoris     2. ICD (implantable cardioverter-defibrillator) in place     3. Permanent atrial fibrillation ([address]     4. COVID-19     concerning patient  Looks very pale  Higher post COVID risk   ? ? Anemia     Plan:  No follow-ups on file. Discussed  Check labs  Continue risk factor modification and medical management  ,Thank you for allowing me to participate in the care of your patient. Please don't hesitate to contact me regarding any further issues related to the patient care    Orders Placed:  No orders of the defined types were placed in this encounter.       Medications Prescribed:  No orders of the defined types were placed in this encounter. Discussed use, benefit, and side effects of prescribed medications. All patient questions answered. Pt voicedunderstanding. Instructed to continue current medications, diet and exercise. Continue risk factor modification and medical management. Patient agreed with treatment plan. Follow up as directed.     Electronically signedby Srinivasa Mohamud MD on 12/15/2020 at 12:34 PM

## 2020-12-16 ENCOUNTER — PROCEDURE VISIT (OUTPATIENT)
Dept: CARDIOLOGY CLINIC | Age: 80
End: 2020-12-16
Payer: MEDICARE

## 2020-12-16 PROCEDURE — 93297 REM INTERROG DEV EVAL ICPMS: CPT | Performed by: NUCLEAR MEDICINE

## 2020-12-16 PROCEDURE — G2066 INTER DEVC REMOTE 30D: HCPCS | Performed by: NUCLEAR MEDICINE

## 2020-12-16 NOTE — PROGRESS NOTES
DR Bria Prabhakar PT   [facility] [address] REMOTE   BATTERY 2.9-3.1 YRS REMAINING  CORVUE ELEVATED BUT NOT NOT A CHF PT  I CALLED THIS PT AND HIS LEGS ARE SWOLLEN . WIFE SAYS HE IS NOT SOB AND NO INCREASED  WEIGHT GAIN. SAYS HIS WEIGHT IS AROUND 180 LBS                   NO EPISODES  WIFE WANTS YOU TO LOOK AT HIS LABS THAT YOU HAD HIM GET YESTERDAY. .PT SAYS YOU THOUGHT HE WAS ANEMIC ??      12/16/2020 10:11 AM - Sera Delvalle MA    Component Value Ref Range & Units Status Resulted Lab   WBC 3.5  10^3/mL Final 12/15/2020 12:00 AM Unknown   Hemoglobin 12. 6Abnormal   13.5 - 17.5 g/dL Final 12/15/2020 12:00 AM Unknown   Hematocrit 38. 9Abnormal   41 - 53 % Final 12/15/2020 12:00 AM Unknown   Platelets 713  K/µL Final 12/15/2020 12:00 AM Unknown   Neutrophils % 58.7  % Final 12/15/2020 12:00 AM Unknown   Lymphocytes % 27.2  % Final 12/15/2020 12:00 AM Unknown   Monocytes % 11.0  % Final 12/15/2020 12:00 AM Unknown   Eosinophils % 2.0  % Final 12/15/2020 12:00 AM Unknown   Basophils % 0.3  % Final 12/15/2020 12:00 AM Unknown   Neutrophils Absolute 2.1  /µL Final 12/15/2020 12:00 AM Unknown   Lymphocytes Absolute 1.0  /µL Final 12/15/2020 12:00 AM Unknown   Monocytes Absolute 0.4  /µL Final 12/15/2020 12:00 AM Unknown   Eosinophils Absolute 0.1  /µL Final 12/15/2020 12:00 AM Unknown   Basophils Absolute 0.0  /µL Final 12/15/2020 12:00 AM Unknown   RBC 4.04  10^6/µL Final 12/15/2020 12:00 AM Unknown   MCV 69.3  fL Final 12/15/2020 12:00 AM Unknown   MCH 31.2  pg Final 12/15/2020 12:00 AM Unknown   MCHC 32.4  g/dL Final 12/15/2020 12:00 AM Unknown   MPV 9.9  fL Final 12/15/2020 12:00 AM Unknown   Lab and Collection    CBC - 12/16/2020

## 2021-01-01 ENCOUNTER — TELEPHONE (OUTPATIENT)
Dept: CARDIOLOGY CLINIC | Age: 81
End: 2021-01-01

## 2021-01-01 ENCOUNTER — PROCEDURE VISIT (OUTPATIENT)
Dept: CARDIOLOGY CLINIC | Age: 81
End: 2021-01-01
Payer: MEDICARE

## 2021-01-01 ENCOUNTER — HOSPITAL ENCOUNTER (OUTPATIENT)
Age: 81
Discharge: HOME | End: 2021-10-20
Payer: MEDICARE

## 2021-01-01 ENCOUNTER — NURSE ONLY (OUTPATIENT)
Dept: CARDIOLOGY CLINIC | Age: 81
End: 2021-01-01
Payer: MEDICARE

## 2021-01-01 ENCOUNTER — OFFICE VISIT (OUTPATIENT)
Dept: CARDIOLOGY CLINIC | Age: 81
End: 2021-01-01
Payer: MEDICARE

## 2021-01-01 VITALS
HEART RATE: 63 BPM | WEIGHT: 196.8 LBS | DIASTOLIC BLOOD PRESSURE: 60 MMHG | SYSTOLIC BLOOD PRESSURE: 94 MMHG | BODY MASS INDEX: 26.66 KG/M2 | HEIGHT: 72 IN | OXYGEN SATURATION: 98 %

## 2021-01-01 DIAGNOSIS — I50.40 COMBINED SYSTOLIC AND DIASTOLIC CONGESTIVE HEART FAILURE, UNSPECIFIED HF CHRONICITY (HCC): Primary | ICD-10-CM

## 2021-01-01 DIAGNOSIS — I50.22 CHF (CONGESTIVE HEART FAILURE), NYHA CLASS II, CHRONIC, SYSTOLIC (HCC): Primary | ICD-10-CM

## 2021-01-01 DIAGNOSIS — Z95.810 ICD (IMPLANTABLE CARDIOVERTER-DEFIBRILLATOR), BIVENTRICULAR, IN SITU: ICD-10-CM

## 2021-01-01 DIAGNOSIS — Z95.810 ICD (IMPLANTABLE CARDIOVERTER-DEFIBRILLATOR), BIVENTRICULAR, IN SITU: Primary | ICD-10-CM

## 2021-01-01 DIAGNOSIS — I50.22 CHF (CONGESTIVE HEART FAILURE), NYHA CLASS II, CHRONIC, SYSTOLIC (HCC): ICD-10-CM

## 2021-01-01 DIAGNOSIS — I48.0 PAROXYSMAL ATRIAL FIBRILLATION (HCC): ICD-10-CM

## 2021-01-01 DIAGNOSIS — N18.32 STAGE 3B CHRONIC KIDNEY DISEASE (HCC): Primary | ICD-10-CM

## 2021-01-01 LAB
ANION GAP SERPL CALCULATED.3IONS-SCNC: 8 MEQ/L (ref 8–16)
BUN BLDV-MCNC: 31 MG/DL (ref 7–22)
CALCIUM SERPL-MCNC: 9.4 MG/DL (ref 8.5–10.5)
CHLORIDE BLD-SCNC: 107 MEQ/L (ref 98–111)
CO2: 32 MEQ/L (ref 23–33)
CREAT SERPL-MCNC: 1.9 MG/DL (ref 0.4–1.2)
GFR SERPL CREATININE-BSD FRML MDRD: 34 ML/MIN/1.73M2
GLUCOSE BLD-MCNC: 76 MG/DL (ref 70–108)
POTASSIUM SERPL-SCNC: 3.8 MEQ/L (ref 3.5–5.2)
SODIUM BLD-SCNC: 147 MEQ/L (ref 135–145)

## 2021-01-01 PROCEDURE — 93295 DEV INTERROG REMOTE 1/2/MLT: CPT | Performed by: NUCLEAR MEDICINE

## 2021-01-01 PROCEDURE — G2066 INTER DEVC REMOTE 30D: HCPCS | Performed by: NUCLEAR MEDICINE

## 2021-01-01 PROCEDURE — 93283 PRGRMG EVAL IMPLANTABLE DFB: CPT | Performed by: NUCLEAR MEDICINE

## 2021-01-01 PROCEDURE — 93296 REM INTERROG EVL PM/IDS: CPT | Performed by: NUCLEAR MEDICINE

## 2021-01-01 PROCEDURE — 80048 BASIC METABOLIC PNL TOTAL CA: CPT

## 2021-01-01 PROCEDURE — 93297 REM INTERROG DEV EVAL ICPMS: CPT | Performed by: NUCLEAR MEDICINE

## 2021-01-01 PROCEDURE — 36415 COLL VENOUS BLD VENIPUNCTURE: CPT

## 2021-01-01 PROCEDURE — 99213 OFFICE O/P EST LOW 20 MIN: CPT | Performed by: NURSE PRACTITIONER

## 2021-01-01 RX ORDER — SACUBITRIL AND VALSARTAN 24; 26 MG/1; MG/1
1 TABLET, FILM COATED ORAL 2 TIMES DAILY
Qty: 60 TABLET | Refills: 11 | Status: SHIPPED | OUTPATIENT
Start: 2021-01-01

## 2021-01-01 ASSESSMENT — ENCOUNTER SYMPTOMS
NAUSEA: 0
COUGH: 0
VOMITING: 0
SHORTNESS OF BREATH: 1
ABDOMINAL DISTENTION: 0

## 2021-01-05 ENCOUNTER — TELEPHONE (OUTPATIENT)
Dept: CARDIOLOGY CLINIC | Age: 81
End: 2021-01-05

## 2021-01-05 DIAGNOSIS — R60.9 WORSENING BODY FLUID RETENTION: Primary | ICD-10-CM

## 2021-01-05 LAB
BASOPHILS ABSOLUTE: 0.1 /ΜL
BASOPHILS RELATIVE PERCENT: 1 %
BUN BLDV-MCNC: 12 MG/DL
CALCIUM SERPL-MCNC: 8.5 MG/DL
CHLORIDE BLD-SCNC: 107 MMOL/L
CO2: 29 MMOL/L
CREAT SERPL-MCNC: 1.2 MG/DL
EOSINOPHILS ABSOLUTE: 0.1 /ΜL
EOSINOPHILS RELATIVE PERCENT: 2.3 %
GFR CALCULATED: 0.6
GLUCOSE BLD-MCNC: 131 MG/DL
HCT VFR BLD CALC: 35.4 % (ref 41–53)
HEMOGLOBIN: 11.3 G/DL (ref 13.5–17.5)
LYMPHOCYTES ABSOLUTE: 1.1 /ΜL
LYMPHOCYTES RELATIVE PERCENT: 20.9 %
MCH RBC QN AUTO: 31.7 PG
MCHC RBC AUTO-ENTMCNC: 31.9 G/DL
MCV RBC AUTO: 99.4 FL
MONOCYTES ABSOLUTE: 0.8 /ΜL
MONOCYTES RELATIVE PERCENT: 16.6 %
NEUTROPHILS ABSOLUTE: 3 /ΜL
NEUTROPHILS RELATIVE PERCENT: 58.4 %
PLATELET # BLD: 199 K/ΜL
PMV BLD AUTO: 9.7 FL
POTASSIUM SERPL-SCNC: 3.7 MMOL/L
RBC # BLD: 3.56 10^6/ΜL
SODIUM BLD-SCNC: 144 MMOL/L
WBC # BLD: 5.1 10^3/ML

## 2021-01-05 NOTE — TELEPHONE ENCOUNTER
[facility] nurse called asking if any medication should be changed pt went to hospital only had catheter placed no change in medication. Dr. Jose Guadalupe Delaney advise?     Radha Penny ph# [phone]

## 2021-01-07 ENCOUNTER — OFFICE VISIT (OUTPATIENT)
Dept: CARDIOLOGY CLINIC | Age: 81
End: 2021-01-07
Payer: MEDICARE

## 2021-01-07 VITALS
HEART RATE: 71 BPM | WEIGHT: 201.4 LBS | BODY MASS INDEX: 27.28 KG/M2 | OXYGEN SATURATION: 93 % | HEIGHT: 72 IN | DIASTOLIC BLOOD PRESSURE: 50 MMHG | SYSTOLIC BLOOD PRESSURE: 90 MMHG

## 2021-01-07 DIAGNOSIS — R60.0 BILATERAL LEG EDEMA: ICD-10-CM

## 2021-01-07 DIAGNOSIS — I48.21 PERMANENT ATRIAL FIBRILLATION (HCC): ICD-10-CM

## 2021-01-07 DIAGNOSIS — Z95.810 ICD (IMPLANTABLE CARDIOVERTER-DEFIBRILLATOR), BIVENTRICULAR, IN SITU: ICD-10-CM

## 2021-01-07 DIAGNOSIS — I50.23 CHF (CONGESTIVE HEART FAILURE), NYHA CLASS III, ACUTE ON CHRONIC, SYSTOLIC (HCC): Primary | ICD-10-CM

## 2021-01-07 PROBLEM — N48.1 BALANITIS: Status: ACTIVE | Noted: 2021-01-07

## 2021-01-07 PROBLEM — Z79.01 CHRONIC ANTICOAGULATION: Status: ACTIVE | Noted: 2020-03-02

## 2021-01-07 PROBLEM — R39.14 BENIGN PROSTATIC HYPERPLASIA WITH INCOMPLETE BLADDER EMPTYING: Status: ACTIVE | Noted: 2020-03-02

## 2021-01-07 PROBLEM — N40.1 BENIGN PROSTATIC HYPERPLASIA WITH INCOMPLETE BLADDER EMPTYING: Status: ACTIVE | Noted: 2020-03-02

## 2021-01-07 LAB — PRO-BNP: 756.3 PG/ML (ref 0–1800)

## 2021-01-07 PROCEDURE — 1036F TOBACCO NON-USER: CPT | Performed by: NURSE PRACTITIONER

## 2021-01-07 PROCEDURE — 99215 OFFICE O/P EST HI 40 MIN: CPT | Performed by: NURSE PRACTITIONER

## 2021-01-07 PROCEDURE — G8482 FLU IMMUNIZE ORDER/ADMIN: HCPCS | Performed by: NURSE PRACTITIONER

## 2021-01-07 PROCEDURE — 4040F PNEUMOC VAC/ADMIN/RCVD: CPT | Performed by: NURSE PRACTITIONER

## 2021-01-07 PROCEDURE — 96374 THER/PROPH/DIAG INJ IV PUSH: CPT | Performed by: NURSE PRACTITIONER

## 2021-01-07 PROCEDURE — 1123F ACP DISCUSS/DSCN MKR DOCD: CPT | Performed by: NURSE PRACTITIONER

## 2021-01-07 PROCEDURE — 96372 THER/PROPH/DIAG INJ SC/IM: CPT | Performed by: NURSE PRACTITIONER

## 2021-01-07 PROCEDURE — G8417 CALC BMI ABV UP PARAM F/U: HCPCS | Performed by: NURSE PRACTITIONER

## 2021-01-07 PROCEDURE — G8427 DOCREV CUR MEDS BY ELIG CLIN: HCPCS | Performed by: NURSE PRACTITIONER

## 2021-01-07 PROCEDURE — 36415 COLL VENOUS BLD VENIPUNCTURE: CPT | Performed by: NURSE PRACTITIONER

## 2021-01-07 RX ORDER — FUROSEMIDE 10 MG/ML
80 INJECTION INTRAMUSCULAR; INTRAVENOUS ONCE
Status: COMPLETED | OUTPATIENT
Start: 2021-01-07 | End: 2021-01-07

## 2021-01-07 RX ORDER — POTASSIUM CHLORIDE 20 MEQ/1
40 TABLET, EXTENDED RELEASE ORAL ONCE
Status: COMPLETED | OUTPATIENT
Start: 2021-01-07 | End: 2021-01-07

## 2021-01-07 RX ORDER — METOLAZONE 5 MG/1
5 TABLET ORAL PRN
Qty: 10 TABLET | Refills: 0 | Status: SHIPPED | OUTPATIENT
Start: 2021-01-07 | End: 2021-01-18

## 2021-01-07 RX ORDER — MIRTAZAPINE 15 MG/1
15 TABLET, FILM COATED ORAL NIGHTLY
COMMUNITY
Start: 2020-12-18 | End: 2022-01-01 | Stop reason: ALTCHOICE

## 2021-01-07 RX ADMIN — FUROSEMIDE 80 MG: 10 INJECTION INTRAMUSCULAR; INTRAVENOUS at 12:18

## 2021-01-07 RX ADMIN — POTASSIUM CHLORIDE 40 MEQ: 20 TABLET, EXTENDED RELEASE ORAL at 12:20

## 2021-01-07 ASSESSMENT — ENCOUNTER SYMPTOMS
ABDOMINAL PAIN: 0
SHORTNESS OF BREATH: 1
APNEA: 0
COLOR CHANGE: 0
COUGH: 0
ABDOMINAL DISTENTION: 0
CHEST TIGHTNESS: 0
WHEEZING: 0
NAUSEA: 0

## 2021-01-07 NOTE — PROGRESS NOTES
Venipuncture obtained from Left forearm. IV Lasix and PO K+ given. Patient tolerated procedure without complications or complaints.

## 2021-01-07 NOTE — PROGRESS NOTES
[facility]       Visit Date: 1/7/2021  Cardiologist:  Dr. Burak Cedeno  Primary Care Physician: Dr. Ratna Hernandez is a 78 y.o. male who presents today for:  No chief complaint on file. HPI: Obtained from patient and chart    Jennifer Shafer is a 78 y.o. male who presents to the office for a new patient visit in the heart failure clinic. Hx A fib, BiV ICD, NICM EF 20%, previous smoker (pipe). His weight is up 20 pounds in a month. He has significant LE edema as well as penile and scrotal. He was admitted to [facility] in November with COVID symptoms. Started on Remeron at that time for anxiety and trouble sleeping. He has noticed an increase in swelling since then. However this is the time he was d/c from the hospital. He c/o PND. He states his energy is actually improving since the [facility]. He can perform basic ADLs without SOB or fatigue but does become SOB and tires with minimal activity. He went to the ER on 1/5 as he could not urinate d/t his penile swelling. BMP and CBC at that that were reviewed and stable. He was not given IV diuresis at that time. Accompanied by: wife  Last hospital admission related to Heart Failure:  Nov 2020 (COVID)  Chest Pain: no  Worsening SOB: some  Worsening Orthopnea/PND: Yes  Edema: yes  Any extra diuretic use: no  Weight gain: yes  Fatigue: yes  Abdominal bloating: no  Appetite: good  SHARA: no  Cough: no  Compliant checking home weight: no 179 lbs (usually)  Compliant checking blood pressure: yes 100      Past Medical History:   Diagnosis Date    Arthritis     Atrial fibrillation [facility])     [facility] single ICD 1/6/2016    Cardiomyopathy ([facility] [address]     Cardiomyopathy, likely nonischemic based on the patient's description. However, don't have a cardiac cath report from [facility].     Hypertension  ICD (implantable cardiac defibrillator) battery depletion: 2013: Generator replacement using "[employer] 2013: Generator replacement using "[employer] device. Atrial lead was capped. Dr. Misael Armenta ICD (implantable cardiac defibrillator), dual, in situ     [employer] [facility] dual ICD    Low blood pressure     Low blood pressure with rapid atrial fibrillation.  Prolonged emergence from general anesthesia      Past Surgical History:   Procedure Laterality Date    CHOLECYSTECTOMY      COLONOSCOPY      DOPPLER ECHOCARDIOGRAPHY  2010    Global LV dilatation and dysfunction. EF 35%. Moderate biatrial enlargement. Mild mitral regurgitation and mild tricuspid regurgitation. No obvious stenotic valves. Borderline to mild pulmonary hypertension. No pericardial effusion.  HAND SURGERY  2017   Callie Officer HERNIA REPAIR  [address] [place], [place].    OTHER SURGICAL HISTORY Left 2017    Left CMC arthroplasty    PACEMAKER PLACEMENT      UPPER GASTROINTESTINAL ENDOSCOPY  2016    with dilation in [place]     Family History   Problem Relation Age of Onset    Alzheimer's Disease Father     Cancer Brother         Brother had throat cancer.  Heart Disease Brother         Another brother had heart disease.  Pacemaker Brother      Social History     Tobacco Use    Smoking status: Former Smoker     Types: Cigarettes     Quit date: 1989     Years since quittin.0    Smokeless tobacco: Former User     Types: Chew    Tobacco comment: Patient states, Cami Felder smokes a pipe occasionally. Substance Use Topics    Alcohol use: No     Current Outpatient Medications   Medication Sig Dispense Refill    mirtazapine (REMERON) 15 MG tablet Take 15 mg by mouth nightly      metOLazone (ZAROXOLYN) 5 MG tablet Take 1 tablet by mouth as needed (as directed by the HF Clinic) 10 tablet 0    amiodarone (CORDARONE) 200 MG tablet Take 1 tablet daily.  90 tablet 3  digoxin (LANOXIN) 125 MCG tablet TAKE 1 TABLET BY MOUTH  DAILY 90 tablet 3    furosemide (LASIX) 40 MG tablet TAKE 1 TABLET BY MOUTH TWO  TIMES DAILY 180 tablet 3    metoprolol tartrate (LOPRESSOR) 25 MG tablet TAKE ONE-HALF TABLET BY  MOUTH TWICE A DAY 90 tablet 3    potassium chloride (KLOR-CON M) 20 MEQ extended release tablet Take 1 tablet by mouth 2 times daily 180 tablet 3    sacubitril-valsartan (ENTRESTO) 24-26 MG per tablet Take 1 tablet by mouth 2 times daily 180 tablet 3    finasteride (PROSCAR) 5 MG tablet Take 5 mg by mouth daily       tamsulosin (FLOMAX) 0.4 MG capsule Take 0.4 mg by mouth 2 times daily      warfarin (COUMADIN) 5 MG tablet Take 5 mg by mouth every evening      Omega-3 Fatty Acids (FISH OIL) 1000 MG CAPS Take 2,000 mg by mouth 2 times daily      therapeutic multivitamin-minerals (THERAGRAN-M) tablet Take 1 tablet by mouth daily. Current Facility-Administered Medications   Medication Dose Route Frequency Provider Last Rate Last Admin    potassium chloride (KLOR-CON M) extended release tablet 40 mEq  40 mEq Oral Once Jennifer Chaudhari, APRN - CNP         No Known Allergies    SUBJECTIVE:   Review of Systems   Constitutional: Positive for fatigue. Negative for activity change, appetite change and fever. HENT: Negative for congestion. Respiratory: Positive for shortness of breath. Negative for apnea, cough, chest tightness and wheezing. Cardiovascular: Positive for leg swelling. Negative for chest pain and palpitations. Gastrointestinal: Negative for abdominal distention, abdominal pain and nausea. Genitourinary: Positive for difficulty urinating, penile swelling and scrotal swelling. Negative for dysuria. Musculoskeletal: Negative for arthralgias and gait problem. Skin: Negative for color change. Neurological: Negative for dizziness, numbness and headaches. Psychiatric/Behavioral: Negative for agitation, confusion and sleep disturbance. The patient is not nervous/anxious. OBJECTIVE:   Today's Vitals:  BP (!) 90/50   Pulse 71   Ht 6' (1.829 m)   Wt 201 lb 6.4 oz (91.4 kg)   SpO2 93%   BMI 27.31 kg/m²     Physical Exam  Vitals signs reviewed. Constitutional:       Appearance: He is well-developed. HENT:      Head: Normocephalic and atraumatic. Eyes:      Pupils: Pupils are equal, round, and reactive to light. Neck:      Musculoskeletal: Normal range of motion. Vascular: No JVD. Cardiovascular:      Rate and Rhythm: Normal rate. Rhythm irregular. Heart sounds: Normal heart sounds. No murmur. Comments: BiV ICD  Pulmonary:      Effort: Pulmonary effort is normal. No respiratory distress. Breath sounds: No rales. Abdominal:      General: There is no distension. Palpations: Abdomen is soft. Tenderness: There is no abdominal tenderness. Musculoskeletal:         General: No tenderness. Right lower leg: Edema (2++ to thigh) present. Left lower leg: Edema (2++ to thigh ) present. Skin:     General: Skin is warm and dry. Capillary Refill: Capillary refill takes less than 2 seconds. Neurological:      Mental Status: He is alert and oriented to person, place, and time. Psychiatric:         Mood and Affect: Mood normal.         Behavior: Behavior normal.         Wt Readings from Last 3 Encounters:   01/07/21 201 lb 6.4 oz (91.4 kg)   12/15/20 180 lb (81.6 kg)   07/21/20 186 lb (84.4 kg)     BP Readings from Last 3 Encounters:   01/07/21 (!) 90/50   12/15/20 134/84   07/21/20 126/72     Pulse Readings from Last 3 Encounters:   01/07/21 71   12/15/20 68   07/21/20 88     Body mass index is 27.31 kg/m².     ECHO:   3/20/18      Results reviewed:  BNP: No results found for: BNP, PROBNP  CBC:   Lab Results   Component Value Date    WBC 5.1 01/05/2021    RBC 3.56 01/05/2021    HGB 11.3 01/05/2021 HCT 35.4 01/05/2021     01/05/2021     CMP:    Lab Results   Component Value Date     01/05/2021    K 3.7 01/05/2021    K 4.7 04/09/2018     01/05/2021    CO2 29.0 01/05/2021    BUN 12 01/05/2021    CREATININE 1.2 01/05/2021    LABGLOM 0.60 01/05/2021    LABGLOM 58 05/04/2018    GLUCOSE 131 01/05/2021    CALCIUM 8.5 01/05/2021     Hepatic Function Panel:    Lab Results   Component Value Date    ALKPHOS 111 12/13/2019    ALT 13 12/13/2019    AST 31 12/13/2019    PROT 6.9 12/13/2019    BILITOT 2.0 12/13/2019    BILIDIR 0.4 12/13/2019    IBILI 1.6 12/13/2019    LABALBU 4.4 12/13/2019     Magnesium:    Lab Results   Component Value Date    MG 2.3 12/03/2019     PT/INR:    Lab Results   Component Value Date    PROTIME 24.5 04/27/2018    INR 1.60 05/04/2018     Lipids:  No results found for: TRIG, HDL, LDLCALC, LDLDIRECT, LABVLDL    ASSESSMENT AND PLAN:   The patient's condition/symptoms are Stable      Diagnosis Orders   1. CHF (congestive heart failure), NYHA class III, acute on chronic, systolic (HCC)  Brain Natriuretic Peptide   2. Permanent atrial fibrillation ([address]     3. ICD (implantable cardioverter-defibrillator), biventricular, in situ     4. Bilateral leg edema         Plan:  · GDMT   · ACE/ARB/ARNi: Entresto 24/26 mg bid -  Unable to titrate d/t lower BPs   · Beta Blocker: Metoprolol 25 mg bid  · Aldosterone antagonist: consider but will need to monitor kidney function and BP if we do  · Diuretic/Potassium: Lasix 40 mg bid, Potassium 20 mEq bid; Metolazone 5 mg PRN  · Hydralazine/Nitrate: no  · Other: Amiodarone, Digoxin, Coumadin  · He has PND, fatigue, weight gain and leg/penile/scrotal edema. Lasix 80 mg IV with Potassium 40 mEq today in the clinic. Call tomorrow with an update and we will decide on Metolazone dose and frequency. Fluid overload could be a result of recent COVID, A fib or Remeron as this was just started a few weeks ago when his symptoms started. · HF Zones reviewed. · Daily weights and record  · Fluid restriction of 2 Liters per day (64 oz)   · Limit sodium in diet to 0879-3030 mg/day  · Healthier food options were discussed   · Monitor BP daily 1 hour after meds are taken  · Increase activity as tolerated     Patient was instructed to call the [facility] for changes in the following symptoms:   ? Weight gain of 3 pounds in 1 day or 5 pounds in 1 week  ? Increased shortness of breath  ? Shortness of breath while laying down  ? Cough  ? Chest pain  ? Swelling in feet, ankles or legs  ? Tenderness or bloating in the abdomen  ? Fatigue   ? Decreased appetite or feeling \"full\"  ? Nausea   ? Confusion      Return in about 1 month (around 2/7/2021). or sooner if needed     Patient given educational materials - see patient instructions. We discussed the importance of weighing oneself and recording daily. We also discussed the importance of a low sodium diet, higher sodium foods to avoid and appropriate low sodium food choices. Discussed use, benefit, and side effects of prescribed medications. All patient questions answered. Patient verbalizes understanding of plan of care using teach back method, and is agreeable to the treatment plan. Greater then 45 minutes of time was spent reviewing the chart and educating the patient about HF, medications, diet, exercise, and discussing the plan of care. I personally spent more then 50% of the appt time face to face with the patient counseling/coordinating patient's care.       Electronicallysigned by FAITH Mccormick CNP on 1/7/2021 at 12:21 PM

## 2021-01-07 NOTE — PATIENT INSTRUCTIONS
You may receive a survey regarding the care you received during your visit. Your input is valuable to us. We encourage you to complete and return your survey. We hope you will choose us in the future for your healthcare needs. NEW ORDERS FROM TODAY:  Metolazone 5 mg - take 1 hour prior to Lasix AS directed byt the HF Clinic   We will let you know how and when to take the Metolazone  Call in the morning with an update     Continue:  · Take all medications as prescribed   · Daily weights and record - please try to weigh yourself upon awakening before eating or drinking   · Fluid restriction of 2 Liters per day (64 oz)  · Limit sodium in diet to around 1800-7491 mg/day  · Monitor BP - take BP 1 hour after meds  · Increase activity as tolerated     Call the [facility] for any of the following symptoms: [phone]  ? Weight gain of 3 pounds in 1 day or 5 pounds in 1 week  ? Increased shortness of breath  ? Shortness of breath while laying down  ? Cough  ? Chest pain  ? Swelling in feet, ankles or legs  ? Tenderness or bloating in the abdomen  ? Fatigue   ? Decreased appetite or feeling \"full\"   ? Nausea   ? Confusion     **PLEASE bring all medications in original bottles with you to your next appointment**    Educational websites:    http://www.[employer]/. org (American Heart Association)    PromotionalReview.nl. com (Entresto/Novartis)    Babytree.com (CardioMEMS)    Too much sodium causes your body to hold on to extra water. This can raise your blood pressure and force your heart and kidneys to work harder. In very serious cases, this could cause you to be put in the hospital. It might even be life-threatening. By limiting sodium, you will feel better and lower your risk of serious problems. The most common source of sodium is salt. People get most of the salt in their diet from canned, prepared, and packaged foods. Fast food and restaurant meals also are very high in sodium. Your doctor will probably limit your sodium to less than 2,000 milligrams (mg) a day. This limit counts all the sodium in prepared and packaged foods and any salt you add to your food. Follow-up care is a key part of your treatment and safety. Be sure to make and go to all appointments, and call your doctor if you are having problems. It's also a good idea to know your test results and keep a list of the medicines you take. How can you care for yourself at home? Read food labels  · Read labels on cans and food packages. The labels tell you how much sodium is in each serving. Make sure that you look at the serving size. If you eat more than the serving size, you have eaten more sodium. · Food labels also tell you the Percent Daily Value for sodium. Choose products with low Percent Daily Values for sodium. · Be aware that sodium can come in forms other than salt, including monosodium glutamate (MSG), sodium citrate, and sodium bicarbonate (baking soda). MSG is often added to Asian food. When you eat out, you can sometimes ask for food without MSG or added salt. Buy low-sodium foods  · Buy foods that are labeled \"unsalted\" (no salt added), \"sodium-free\" (less than 5 mg of sodium per serving), or \"low-sodium\" (less than 140 mg of sodium per serving). Foods labeled \"reduced-sodium\" and \"light sodium\" may still have too much sodium. Be sure to read the label to see how much sodium you are getting. · Buy fresh vegetables, or frozen vegetables without added sauces. Buy low-sodium versions of canned vegetables, soups, and other canned goods.   Prepare low-sodium meals · Cut back on the amount of salt you use in cooking. This will help you adjust to the taste. Do not add salt after cooking. One teaspoon of salt has about 2,300 mg of sodium. · Take the salt shaker off the table. · Flavor your food with garlic, lemon juice, onion, vinegar, herbs, and spices. Do not use soy sauce, lite soy sauce, steak sauce, onion salt, garlic salt, celery salt, mustard, or ketchup on your food. · Use low-sodium salad dressings, sauces, and ketchup. Or make your own salad dressings and sauces without adding salt. · Use less salt (or none) when recipes call for it. You can often use half the salt a recipe calls for without losing flavor. Other foods such as rice, pasta, and grains do not need added salt. · Rinse canned vegetables, and cook them in fresh water. This removes somebut not allof the salt. · Avoid water that is naturally high in sodium or that has been treated with water softeners, which add sodium. Call your local water company to find out the sodium content of your water supply. If you buy bottled water, read the label and choose a sodium-free brand. Avoid high-sodium foods  · Avoid eating:  ? Smoked, cured, salted, and canned meat, fish, and poultry. ? Ham, wayne, hot dogs, and luncheon meats. ? Regular, hard, and processed cheese and regular peanut butter. ? Crackers with salted tops, and other salted snack foods such as pretzels, chips, and salted popcorn. ? Frozen prepared meals, unless labeled low-sodium. ? Canned and dried soups, broths, and bouillon, unless labeled sodium-free or low-sodium. ? Canned vegetables, unless labeled sodium-free or low-sodium. ? [place] fries, pizza, tacos, and other fast foods. ? Pickles, olives, ketchup, and other condiments, especially soy sauce, unless labeled sodium-free or low-sodium.

## 2021-01-08 ENCOUNTER — TELEPHONE (OUTPATIENT)
Dept: CARDIOLOGY CLINIC | Age: 81
End: 2021-01-08

## 2021-01-08 NOTE — TELEPHONE ENCOUNTER
Patient wife called in with update. Weight at home 195 lb. /57 P 52. After receiving IV diuresis yesterday, by the time he got home until 9pm, he had a total of 2600ml out of rosas catheter. Wife thinks the swelling in legs have gone down but still has penis swelling.

## 2021-01-08 NOTE — TELEPHONE ENCOUNTER
Metolazone 5 mg x 2 days - take 1 hour prior to Lasix  Also take an extra Potassium 20 mEq x 2 days with the Metolazone   Call on Monday with an update

## 2021-01-11 NOTE — TELEPHONE ENCOUNTER
Patient wife called in. Saturday Wt 191 lb, /72, P 63. Void 5275ml  Sunday Wt 188 lb, /67, P 63, Void 3900ml  Monday Wt 185 lb, /64, P 52, Void so far 900ml . Patient wife stated he is really feeling good and the swelling has gone down.  Pt took the Metolazone as Rx on Friday and Saturday

## 2021-01-11 NOTE — TELEPHONE ENCOUNTER
Good to hear  Continue to monitor  Have them call for any increase in swelling or weight or if more SOB, orthopnea, etc  Call back next Monday with an update or sooner if needed

## 2021-01-15 NOTE — TELEPHONE ENCOUNTER
Patient wife Ritika Gardner called in and LM on machine stating he had weight gain. Weight yesterday was 189 lb and today 191 lb. Has some swelling in right leg around knee/thigh. She wasn't sure if she should give any more Metolazone. Called back.    Weights  1/15- 191lb  1/14- 189 lb  1/13- 187 lb  1/12- 187 lb

## 2021-01-15 NOTE — TELEPHONE ENCOUNTER
Metolazone 5 mg x 1 day with an extra Potassium 20 mEq  May repeat a second day over the weekend if weight gain returns  Call Monday with an update

## 2021-01-16 NOTE — TELEPHONE ENCOUNTER
I called and spoke to wife Magalie Just  She stated his rosas was removed on Wednesday  He does have a follow up on Monday with Urology   She said his penis and scrotal swelling are improving  Due to weight gain and swelling she was advised to take Metolazone 5 mg with an extra Potassium 20 mEq tomorrow (since it is evening now)  And to repeat on Sunday if needed and call me on Monday with an update  She verbalized understanding

## 2021-01-18 RX ORDER — METOLAZONE 5 MG/1
5 TABLET ORAL WEEKLY
Qty: 10 TABLET | Refills: 0
Start: 2021-01-18 | End: 2021-02-04 | Stop reason: SDUPTHER

## 2021-01-18 NOTE — TELEPHONE ENCOUNTER
Add Metolazone 5 mg once a week with an extra Potassium 20 mEq with the Metolazone (Potassium in addition to his regular dose)  Take Metolazone 1 hour prior to Lasix   As him to take the Metolazone the same day every week - start this week  We will check a BMP at his appt with me on 2/4

## 2021-01-18 NOTE — TELEPHONE ENCOUNTER
Wife, Loren Salazar, called in. Weights  1/15 - 191 lb  1/16 (Sat)- 195 lb, took Metolazone and K+  1/17 (Sun) - 197 lb, took Metolazone and K+  1/18- 193 lb, /66, P- 59  Swelling still seems to be down except in his penis and is seeing the urologist today.

## 2021-01-20 ENCOUNTER — PROCEDURE VISIT (OUTPATIENT)
Dept: CARDIOLOGY CLINIC | Age: 81
End: 2021-01-20
Payer: MEDICARE

## 2021-01-20 DIAGNOSIS — Z95.810 ICD (IMPLANTABLE CARDIOVERTER-DEFIBRILLATOR), BIVENTRICULAR, IN SITU: Primary | ICD-10-CM

## 2021-01-20 PROCEDURE — 93295 DEV INTERROG REMOTE 1/2/MLT: CPT | Performed by: NUCLEAR MEDICINE

## 2021-01-20 PROCEDURE — 93296 REM INTERROG EVL PM/IDS: CPT | Performed by: NUCLEAR MEDICINE

## 2021-01-20 NOTE — PROGRESS NOTES
[facility] biv icd   Programmed VVIR     2.7-3 years on device   Presenting rhythm  At [facility] biv paced     Atrial impedance 350  RV impedance 240    Shock 46    R wave sensing 7.8      99 % RV paced     RV threshold 0.75 V at 0.5ms  LV not performed

## 2021-01-29 ENCOUNTER — TELEPHONE (OUTPATIENT)
Dept: CARDIOLOGY CLINIC | Age: 81
End: 2021-01-29

## 2021-01-29 NOTE — TELEPHONE ENCOUNTER
Wife called in stating patient was in hospital Wed and Thurs this week d/t COVID vaccine side effects. He had fever and SOB. They did not give his weekly dose of Metolazone on Wed while in hospital. Patients wife gave dose today and stated weight was 201 lb, prior to hospital weight was 194 lb. Denies penis swelling, still has some swelling in legs and thighs. No other HF symptoms other than weight gain. [facility] from [facility] to give an extra dose of Metolazone tomorrow with K+ 20meq if weight is still up and to call Monday with an update.

## 2021-02-01 NOTE — TELEPHONE ENCOUNTER
Wife called in with update. Weight back down to 194 lb after Metolazone given on Friday and Saturday. /59, P 60. Patient \"doing good now\".

## 2021-02-04 ENCOUNTER — OFFICE VISIT (OUTPATIENT)
Dept: CARDIOLOGY CLINIC | Age: 81
End: 2021-02-04
Payer: MEDICARE

## 2021-02-04 VITALS
OXYGEN SATURATION: 92 % | HEIGHT: 72 IN | DIASTOLIC BLOOD PRESSURE: 60 MMHG | SYSTOLIC BLOOD PRESSURE: 110 MMHG | HEART RATE: 73 BPM | BODY MASS INDEX: 28.58 KG/M2 | WEIGHT: 211 LBS

## 2021-02-04 DIAGNOSIS — R60.0 BILATERAL LEG EDEMA: ICD-10-CM

## 2021-02-04 DIAGNOSIS — I25.10 CORONARY ARTERY DISEASE INVOLVING NATIVE CORONARY ARTERY OF NATIVE HEART WITHOUT ANGINA PECTORIS: ICD-10-CM

## 2021-02-04 DIAGNOSIS — I48.21 PERMANENT ATRIAL FIBRILLATION (HCC): ICD-10-CM

## 2021-02-04 DIAGNOSIS — Z95.810 ICD (IMPLANTABLE CARDIOVERTER-DEFIBRILLATOR) IN PLACE: ICD-10-CM

## 2021-02-04 DIAGNOSIS — I50.23 CHF (CONGESTIVE HEART FAILURE), NYHA CLASS III, ACUTE ON CHRONIC, SYSTOLIC (HCC): Primary | ICD-10-CM

## 2021-02-04 PROCEDURE — G8427 DOCREV CUR MEDS BY ELIG CLIN: HCPCS | Performed by: NURSE PRACTITIONER

## 2021-02-04 PROCEDURE — G8482 FLU IMMUNIZE ORDER/ADMIN: HCPCS | Performed by: NURSE PRACTITIONER

## 2021-02-04 PROCEDURE — 96374 THER/PROPH/DIAG INJ IV PUSH: CPT | Performed by: NURSE PRACTITIONER

## 2021-02-04 PROCEDURE — G8417 CALC BMI ABV UP PARAM F/U: HCPCS | Performed by: NURSE PRACTITIONER

## 2021-02-04 PROCEDURE — 99214 OFFICE O/P EST MOD 30 MIN: CPT | Performed by: NURSE PRACTITIONER

## 2021-02-04 PROCEDURE — 1123F ACP DISCUSS/DSCN MKR DOCD: CPT | Performed by: NURSE PRACTITIONER

## 2021-02-04 PROCEDURE — 4040F PNEUMOC VAC/ADMIN/RCVD: CPT | Performed by: NURSE PRACTITIONER

## 2021-02-04 PROCEDURE — 1036F TOBACCO NON-USER: CPT | Performed by: NURSE PRACTITIONER

## 2021-02-04 RX ORDER — SULFAMETHOXAZOLE AND TRIMETHOPRIM 800; 160 MG/1; MG/1
TABLET ORAL
COMMUNITY
Start: 2021-01-27 | End: 2021-03-01 | Stop reason: ALTCHOICE

## 2021-02-04 RX ORDER — FUROSEMIDE 10 MG/ML
80 INJECTION INTRAMUSCULAR; INTRAVENOUS ONCE
Status: COMPLETED | OUTPATIENT
Start: 2021-02-04 | End: 2021-02-04

## 2021-02-04 RX ORDER — METOLAZONE 5 MG/1
5 TABLET ORAL WEEKLY
Qty: 15 TABLET | Refills: 2 | Status: SHIPPED | OUTPATIENT
Start: 2021-02-04 | End: 2021-03-09

## 2021-02-04 RX ORDER — POTASSIUM CHLORIDE 20 MEQ/1
40 TABLET, EXTENDED RELEASE ORAL ONCE
Status: COMPLETED | OUTPATIENT
Start: 2021-02-04 | End: 2021-02-04

## 2021-02-04 RX ADMIN — POTASSIUM CHLORIDE 40 MEQ: 20 TABLET, EXTENDED RELEASE ORAL at 12:19

## 2021-02-04 RX ADMIN — FUROSEMIDE 80 MG: 10 INJECTION INTRAMUSCULAR; INTRAVENOUS at 12:18

## 2021-02-04 ASSESSMENT — ENCOUNTER SYMPTOMS
SHORTNESS OF BREATH: 1
ABDOMINAL DISTENTION: 0
ABDOMINAL PAIN: 0
APNEA: 0
CHEST TIGHTNESS: 0
COLOR CHANGE: 0
NAUSEA: 0
COUGH: 0
WHEEZING: 0

## 2021-02-04 NOTE — PATIENT INSTRUCTIONS
You may receive a survey regarding the care you received during your visit. Your input is valuable to us. We encourage you to complete and return your survey. We hope you will choose us in the future for your healthcare needs. NEW ORDERS FROM TODAY:  Metolazone 5 mg x 3 days starting tomorrow - give 1 hour prior to Lasix  Give additional Potassium 20 mEq daily  Call me on Monday with an update     Continue:  · Take all medications as prescribed   · Daily weights and record - please try to weigh yourself upon awakening before eating or drinking   · Fluid restriction of 2 Liters per day (64 oz)  · Limit sodium in diet to around 5085-4082 mg/day  · Monitor BP - take BP 1 hour after meds  · Increase activity as tolerated     Call the [address] for any of the following symptoms: [phone]  ? Weight gain of 3 pounds in 1 day or 5 pounds in 1 week  ? Increased shortness of breath  ? Shortness of breath while laying down  ? Cough  ? Chest pain  ? Swelling in feet, ankles or legs  ? Tenderness or bloating in the abdomen  ? Fatigue   ? Decreased appetite or feeling \"full\"   ? Nausea   ? Confusion     **PLEASE bring all medications in original bottles with you to your next appointment**    Educational websites:    http://www.[employer].Symphogen/. org (American Heart Association)    PromotionContractually.nl. com (Entresto/Novartis)    [employer] HF.com (CardioMEMS)    Too much sodium causes your body to hold on to extra water. This can raise your blood pressure and force your heart and kidneys to work harder. In very serious cases, this could cause you to be put in the hospital. It might even be life-threatening. By limiting sodium, you will feel better and lower your risk of serious problems. The most common source of sodium is salt. People get most of the salt in their diet from canned, prepared, and packaged foods. Fast food and restaurant meals also are very high in sodium. Your doctor will probably limit your sodium to less than 2,000 milligrams (mg) a day. This limit counts all the sodium in prepared and packaged foods and any salt you add to your food. Follow-up care is a key part of your treatment and safety. Be sure to make and go to all appointments, and call your doctor if you are having problems. It's also a good idea to know your test results and keep a list of the medicines you take. How can you care for yourself at home? Read food labels  · Read labels on cans and food packages. The labels tell you how much sodium is in each serving. Make sure that you look at the serving size. If you eat more than the serving size, you have eaten more sodium. · Food labels also tell you the Percent Daily Value for sodium. Choose products with low Percent Daily Values for sodium. · Be aware that sodium can come in forms other than salt, including monosodium glutamate (MSG), sodium citrate, and sodium bicarbonate (baking soda). MSG is often added to Asian food. When you eat out, you can sometimes ask for food without MSG or added salt. Buy low-sodium foods  · Buy foods that are labeled \"unsalted\" (no salt added), \"sodium-free\" (less than 5 mg of sodium per serving), or \"low-sodium\" (less than 140 mg of sodium per serving). Foods labeled \"reduced-sodium\" and \"light sodium\" may still have too much sodium. Be sure to read the label to see how much sodium you are getting. · Buy fresh vegetables, or frozen vegetables without added sauces. Buy low-sodium versions of canned vegetables, soups, and other canned goods.   Prepare low-sodium meals · Cut back on the amount of salt you use in cooking. This will help you adjust to the taste. Do not add salt after cooking. One teaspoon of salt has about 2,300 mg of sodium. · Take the salt shaker off the table. · Flavor your food with garlic, lemon juice, onion, vinegar, herbs, and spices. Do not use soy sauce, lite soy sauce, steak sauce, onion salt, garlic salt, celery salt, mustard, or ketchup on your food. · Use low-sodium salad dressings, sauces, and ketchup. Or make your own salad dressings and sauces without adding salt. · Use less salt (or none) when recipes call for it. You can often use half the salt a recipe calls for without losing flavor. Other foods such as rice, pasta, and grains do not need added salt. · Rinse canned vegetables, and cook them in fresh water. This removes somebut not allof the salt. · Avoid water that is naturally high in sodium or that has been treated with water softeners, which add sodium. Call your local water company to find out the sodium content of your water supply. If you buy bottled water, read the label and choose a sodium-free brand. Avoid high-sodium foods  · Avoid eating:  ? Smoked, cured, salted, and canned meat, fish, and poultry. ? Ham, wayne, hot dogs, and luncheon meats. ? Regular, hard, and processed cheese and regular peanut butter. ? Crackers with salted tops, and other salted snack foods such as pretzels, chips, and salted popcorn. ? Frozen prepared meals, unless labeled low-sodium. ? Canned and dried soups, broths, and bouillon, unless labeled sodium-free or low-sodium. ? Canned vegetables, unless labeled sodium-free or low-sodium. ? [place] fries, pizza, tacos, and other fast foods. ? Pickles, olives, ketchup, and other condiments, especially soy sauce, unless labeled sodium-free or low-sodium.

## 2021-02-04 NOTE — PROGRESS NOTES
Cardiomyopathy, likely nonischemic based on the patient's description. However, don't have a cardiac cath report from [facility].  Hypertension     ICD (implantable cardiac defibrillator) battery depletion: 2013: Generator replacement using [employer] 2013: Generator replacement using Exoprise device. Atrial lead was capped. Dr. Karen Hooks ICD (implantable cardiac defibrillator), dual, in situ     [employer] dual ICD    Low blood pressure     Low blood pressure with rapid atrial fibrillation.  Prolonged emergence from general anesthesia      Past Surgical History:   Procedure Laterality Date    CHOLECYSTECTOMY      COLONOSCOPY      DOPPLER ECHOCARDIOGRAPHY  2010    Global LV dilatation and dysfunction. EF 35%. Moderate biatrial enlargement. Mild mitral regurgitation and mild tricuspid regurgitation. No obvious stenotic valves. Borderline to mild pulmonary hypertension. No pericardial effusion.  HAND SURGERY  2017   [facility] HERNIA REPAIR  [address] [place], [place].    OTHER SURGICAL HISTORY Left 2017    Left [facility] arthroplasty    PACEMAKER PLACEMENT      UPPER GASTROINTESTINAL ENDOSCOPY  2016    with dilation in [place]     Family History   Problem Relation Age of Onset    Alzheimer's Disease Father     Cancer Brother         Brother had throat cancer.  Heart Disease Brother         Another brother had heart disease.  Pacemaker Brother      Social History     Tobacco Use    Smoking status: Former Smoker     Types: Cigarettes     Quit date: 1989     Years since quittin.1    Smokeless tobacco: Former User     Types: Chew    Tobacco comment: [facility].    Substance Use Topics    Alcohol use: No     Current Outpatient Medications   Medication Sig Dispense Refill    metOLazone (ZAROXOLYN) 5 MG tablet Take 1 tablet by mouth once a week And PRN as directed by HF clinic 15 tablet 2  mirtazapine (REMERON) 15 MG tablet Take 15 mg by mouth nightly      amiodarone (CORDARONE) 200 MG tablet Take 1 tablet daily. 90 tablet 3    digoxin (LANOXIN) 125 MCG tablet TAKE 1 TABLET BY MOUTH  DAILY 90 tablet 3    furosemide (LASIX) 40 MG tablet TAKE 1 TABLET BY MOUTH TWO  TIMES DAILY 180 tablet 3    metoprolol tartrate (LOPRESSOR) 25 MG tablet TAKE ONE-HALF TABLET BY  MOUTH TWICE A DAY 90 tablet 3    potassium chloride (KLOR-CON M) 20 MEQ extended release tablet Take 1 tablet by mouth 2 times daily 180 tablet 3    sacubitril-valsartan (ENTRESTO) 24-26 MG per tablet Take 1 tablet by mouth 2 times daily 180 tablet 3    finasteride (PROSCAR) 5 MG tablet Take 5 mg by mouth daily       tamsulosin (FLOMAX) 0.4 MG capsule Take 0.4 mg by mouth 2 times daily      warfarin (COUMADIN) 5 MG tablet Take 5 mg by mouth every evening      Omega-3 Fatty Acids (FISH OIL) 1000 MG CAPS Take 2,000 mg by mouth 2 times daily      therapeutic multivitamin-minerals (THERAGRAN-M) tablet Take 1 tablet by mouth daily.  sulfamethoxazole-trimethoprim (BACTRIM DS;SEPTRA DS) 800-160 MG per tablet        No current facility-administered medications for this visit. No Known Allergies    SUBJECTIVE:   Review of Systems   Constitutional: Positive for fatigue. Negative for activity change, appetite change and fever. HENT: Negative for congestion. Respiratory: Positive for shortness of breath. Negative for apnea, cough, chest tightness and wheezing. Cardiovascular: Positive for leg swelling. Negative for chest pain and palpitations. Gastrointestinal: Negative for abdominal distention, abdominal pain and nausea. Genitourinary: Positive for penile swelling and scrotal swelling. Negative for difficulty urinating and dysuria. Musculoskeletal: Negative for arthralgias and gait problem. Skin: Negative for color change. Neurological: Negative for dizziness, numbness and headaches. Psychiatric/Behavioral: Negative for agitation, confusion and sleep disturbance. The patient is not nervous/anxious. OBJECTIVE:   Today's Vitals:  /60   Pulse 73   Ht 6' (1.829 m)   Wt 211 lb (95.7 kg)   SpO2 92%   BMI 28.62 kg/m²     Physical Exam  Vitals signs reviewed. Constitutional:       Appearance: He is well-developed. HENT:      Head: Normocephalic and atraumatic. Eyes:      Pupils: Pupils are equal, round, and reactive to light. Neck:      Musculoskeletal: Normal range of motion. Vascular: No JVD. Cardiovascular:      Rate and Rhythm: Normal rate and regular rhythm. Heart sounds: Normal heart sounds. No murmur. Comments: Bi V ICD  Pulmonary:      Effort: Pulmonary effort is normal. No respiratory distress. Breath sounds: No rales. Abdominal:      General: There is no distension. Palpations: Abdomen is soft. Tenderness: There is no abdominal tenderness. Musculoskeletal:         General: No tenderness. Right lower leg: Edema (2-3+) present. Left lower leg: Edema (2-3+) present. Skin:     General: Skin is warm and dry. Capillary Refill: Capillary refill takes less than 2 seconds. Neurological:      Mental Status: He is alert and oriented to person, place, and time. Psychiatric:         Mood and Affect: Mood normal.         Behavior: Behavior normal.         Wt Readings from Last 3 Encounters:   02/04/21 211 lb (95.7 kg)   01/07/21 201 lb 6.4 oz (91.4 kg)   12/15/20 180 lb (81.6 kg)     BP Readings from Last 3 Encounters:   02/04/21 110/60   01/07/21 (!) 90/50   12/15/20 134/84     Pulse Readings from Last 3 Encounters:   02/04/21 73   01/07/21 71   12/15/20 68     Body mass index is 28.62 kg/m².     ECHO:   3/20/18      Results reviewed:  BNP:   Lab Results   Component Value Date    PROBNP 756.3 01/07/2021     CBC:   Lab Results   Component Value Date    WBC 5.1 01/05/2021    RBC 3.56 01/05/2021    HGB 11.3 01/05/2021 · His weight is up 10 pounds in 3 days with significant LE edema. He will be given Lasix 80 mg IV with Potassium 40 mEq PO today. Starting tomorrow Metolazone 5 mg x 3 days with an extra Potassium 20 mEq. They are to call on Monday with an update. BP stable. He is wearing compression socks. · HF Zones reviewed. · Daily weights and record  · Fluid restriction of 2 Liters per day (64 oz)   · Limit sodium in diet to 4345-6650 mg/day  · Healthier food options were discussed   · Monitor BP daily 1 hour after meds are taken  · Increase activity as tolerated     Patient was instructed to call the [facility] for changes in the following symptoms:   ? Weight gain of 3 pounds in 1 day or 5 pounds in 1 week  ? Increased shortness of breath  ? Shortness of breath while laying down  ? Cough  ? Chest pain  ? Swelling in feet, ankles or legs  ? Tenderness or bloating in the abdomen  ? Fatigue   ? Decreased appetite or feeling \"full\"  ? Nausea   ? Confusion      Return in about 2 months (around 4/4/2021). or sooner if needed     Patient given educational materials - see patient instructions. We discussed the importance of weighing oneself and recording daily. We also discussed the importance of a low sodium diet, higher sodium foods to avoid and appropriate low sodium food choices. Discussed use, benefit, and side effects of prescribed medications. All patient questions answered. Patient verbalizes understanding of plan of care using teach back method, and is agreeable to the treatment plan. Greater then 45 minutes of time was spent reviewing the chart and educating the patient about HF, medications, diet, exercise, and discussing the plan of care. I personally spent more then 50% of the appt time face to face with the patient counseling/coordinating patient's care.       Electronicallysigned by Gerardo Hodgkin, APRN - CNP on 2/4/2021 at 3:47 PM

## 2021-02-05 ENCOUNTER — TELEPHONE (OUTPATIENT)
Dept: CARDIOLOGY CLINIC | Age: 81
End: 2021-02-05

## 2021-02-05 NOTE — TELEPHONE ENCOUNTER
I called and spoke to pt wife about Jey Wheeler after IV Diuresis  Weight is down 5 pounds   He was able to sleep in bed  Swelling is improving  He is to call on Monday with an update

## 2021-02-08 NOTE — TELEPHONE ENCOUNTER
Wife called in with update. Saturday 2/6   197 lb, /66, P 62  Sunday 2/7   198 lb, /75, P 75  Monday 2/8 196 lb, /71, P 65    Was able to sleep in bed all night and feels the swelling is down.

## 2021-02-17 ENCOUNTER — HOSPITAL ENCOUNTER (OUTPATIENT)
Dept: NON INVASIVE DIAGNOSTICS | Age: 81
Discharge: HOME | End: 2021-02-17
Payer: MEDICARE

## 2021-02-17 DIAGNOSIS — I50.23 CHF (CONGESTIVE HEART FAILURE), NYHA CLASS III, ACUTE ON CHRONIC, SYSTOLIC (HCC): ICD-10-CM

## 2021-02-17 DIAGNOSIS — R60.0 BILATERAL LEG EDEMA: ICD-10-CM

## 2021-02-17 LAB
LV EF: 30 %
LVEF MODALITY: NORMAL

## 2021-02-17 PROCEDURE — 93306 TTE W/DOPPLER COMPLETE: CPT

## 2021-02-18 ENCOUNTER — TELEPHONE (OUTPATIENT)
Dept: CARDIOLOGY CLINIC | Age: 81
End: 2021-02-18

## 2021-02-18 NOTE — TELEPHONE ENCOUNTER
Patient wife, Ben Quinn, called with update. Swelling has been pretty good, leg swelling is down. Did have to have a rosas catheter placed again 2/8/21.     Date Weight   2/8 196   2/9 196   2/10 196 took weekly Metolazone   2/11 193   2/12 196   2/13 197   2/14 197   2/15 198   2/16 199   2/17 199 took weekly Metolazone   2/18 199     Notified of Echo results

## 2021-02-24 ENCOUNTER — PROCEDURE VISIT (OUTPATIENT)
Dept: CARDIOLOGY CLINIC | Age: 81
End: 2021-02-24
Payer: MEDICARE

## 2021-02-24 DIAGNOSIS — I50.23 CHF (CONGESTIVE HEART FAILURE), NYHA CLASS III, ACUTE ON CHRONIC, SYSTOLIC (HCC): Primary | ICD-10-CM

## 2021-02-24 PROCEDURE — 93297 REM INTERROG DEV EVAL ICPMS: CPT | Performed by: NUCLEAR MEDICINE

## 2021-02-24 PROCEDURE — G2066 INTER DEVC REMOTE 30D: HCPCS | Performed by: NUCLEAR MEDICINE

## 2021-02-25 ENCOUNTER — TELEPHONE (OUTPATIENT)
Dept: CARDIOLOGY CLINIC | Age: 81
End: 2021-02-25

## 2021-02-25 NOTE — TELEPHONE ENCOUNTER
Wife, Kylie Salinas, called with weights. Since he had weight gain she was not sure if she should give another Metolazone. Just gave weekly one yesterday. Denies any symptoms.     Date Weight   2/21 200   2/22 201   2/23 203   2/24 204 weekly Metolazone given   2/25 205

## 2021-02-25 NOTE — TELEPHONE ENCOUNTER
Metolazone 5 mg today  Also take and extra Lasix 20 mg (to equal 60 mg with his second dose)  Extra potassium 20 mEq today

## 2021-03-01 ENCOUNTER — OFFICE VISIT (OUTPATIENT)
Dept: CARDIOLOGY CLINIC | Age: 81
End: 2021-03-01
Payer: MEDICARE

## 2021-03-01 VITALS
DIASTOLIC BLOOD PRESSURE: 70 MMHG | SYSTOLIC BLOOD PRESSURE: 110 MMHG | BODY MASS INDEX: 27.74 KG/M2 | WEIGHT: 204.8 LBS | HEIGHT: 72 IN | OXYGEN SATURATION: 97 % | HEART RATE: 64 BPM

## 2021-03-01 DIAGNOSIS — I48.21 PERMANENT ATRIAL FIBRILLATION (HCC): ICD-10-CM

## 2021-03-01 DIAGNOSIS — I50.22 CHF (CONGESTIVE HEART FAILURE), NYHA CLASS II, CHRONIC, SYSTOLIC (HCC): Primary | ICD-10-CM

## 2021-03-01 DIAGNOSIS — I42.8 NICM (NONISCHEMIC CARDIOMYOPATHY) (HCC): ICD-10-CM

## 2021-03-01 DIAGNOSIS — Z95.810 ICD (IMPLANTABLE CARDIOVERTER-DEFIBRILLATOR), BIVENTRICULAR, IN SITU: ICD-10-CM

## 2021-03-01 PROCEDURE — G8482 FLU IMMUNIZE ORDER/ADMIN: HCPCS | Performed by: NURSE PRACTITIONER

## 2021-03-01 PROCEDURE — 1123F ACP DISCUSS/DSCN MKR DOCD: CPT | Performed by: NURSE PRACTITIONER

## 2021-03-01 PROCEDURE — 99214 OFFICE O/P EST MOD 30 MIN: CPT | Performed by: NURSE PRACTITIONER

## 2021-03-01 PROCEDURE — 4040F PNEUMOC VAC/ADMIN/RCVD: CPT | Performed by: NURSE PRACTITIONER

## 2021-03-01 PROCEDURE — G8417 CALC BMI ABV UP PARAM F/U: HCPCS | Performed by: NURSE PRACTITIONER

## 2021-03-01 PROCEDURE — G8427 DOCREV CUR MEDS BY ELIG CLIN: HCPCS | Performed by: NURSE PRACTITIONER

## 2021-03-01 PROCEDURE — 1036F TOBACCO NON-USER: CPT | Performed by: NURSE PRACTITIONER

## 2021-03-01 RX ORDER — TORSEMIDE 20 MG/1
40 TABLET ORAL DAILY
Qty: 60 TABLET | Refills: 0 | Status: SHIPPED | OUTPATIENT
Start: 2021-03-01 | End: 2021-03-09

## 2021-03-01 ASSESSMENT — ENCOUNTER SYMPTOMS
CHEST TIGHTNESS: 0
ABDOMINAL DISTENTION: 0
WHEEZING: 0
ABDOMINAL PAIN: 0
COLOR CHANGE: 0
COUGH: 0
APNEA: 0
NAUSEA: 0
SHORTNESS OF BREATH: 1

## 2021-03-01 NOTE — PROGRESS NOTES
[facility]       Visit Date: 3/1/2021  Cardiologist:  Dr. Franky Nunes  Primary Care Physician: Dr. Jeffy Burdick is a 78 y.o. male who presents today for:  Chief Complaint   Patient presents with    Congestive Heart Failure       HPI: Obtained from patient and chart    Romain Reed is a 78 y.o. male who presents to the office for a follow up visit in the heart failure clinic. Hx A fib, BiV ICD, NICM EF 30% (2021) previous 20% (2018), previous smoker (pipe). He was admitted to [facility] in [facility] [address] with COVID symptoms. Started on Remeron at that time for anxiety and trouble sleeping. He had noticed an increase in swelling since then. However this is the time he was d/c from the hospital. He c/o PND. He went to the ER on 1/5/21 as he could not urinate d/t his penile and scrotal swelling. He received IV diuresis at his first OV with me 1/7/21 and also given Metolazone and had good results. On Jan 25 he received the COVID vaccine and he had a reaction of fever, was admitted to [facility]. He had fluid overload his weight was up 10 pounds in 3 days with significant LE edema. He states he penis and scrotum are swollen, tired and SOB with activity with PND. He was again given IV Lasix on 2/4/21 at the [address] [facility] with me with good results. His weight is down 8 pounds. He still has LE edema. He has a rosas. He is making urine. He states his penis and scrotum are still swollen but are improving. He is SOB with any activity.        Accompanied by: wife  Last hospital admission related to Heart Failure:  Nov 2020  Chest Pain: no  Worsening SOB: yes  Worsening Orthopnea/PND: imrpoved  Edema: improving but still present   Any extra diuretic use: see hpi  Weight gain: no  Fatigue: improving  Abdominal bloating: no  Appetite: good  SHARA: no  Cough: no  Compliant checking home weight: yes 199-201 lbs  Compliant checking blood pressure: yes 102-110       Past Medical History: Psychiatric/Behavioral: Negative for agitation, confusion and sleep disturbance. The patient is not nervous/anxious. OBJECTIVE:   Today's Vitals:  /70   Pulse 64   Ht 6' (1.829 m)   Wt 204 lb 12.8 oz (92.9 kg)   SpO2 97%   BMI 27.78 kg/m²     Physical Exam  Vitals signs reviewed. Constitutional:       Appearance: He is well-developed. HENT:      Head: Normocephalic and atraumatic. Eyes:      Pupils: Pupils are equal, round, and reactive to light. Neck:      Musculoskeletal: Normal range of motion. Vascular: No JVD. Cardiovascular:      Rate and Rhythm: Normal rate and regular rhythm. Heart sounds: Normal heart sounds. No murmur. Comments: BiV ICD  Pulmonary:      Effort: Pulmonary effort is normal. No respiratory distress. Breath sounds: No rales. Abdominal:      General: There is no distension. Palpations: Abdomen is soft. Tenderness: There is no abdominal tenderness. Musculoskeletal:         General: No tenderness. Right lower leg: Edema (2++) present. Left lower leg: Edema (2++) present. Skin:     General: Skin is warm and dry. Capillary Refill: Capillary refill takes less than 2 seconds. Neurological:      Mental Status: He is alert and oriented to person, place, and time. Psychiatric:         Mood and Affect: Mood normal.         Behavior: Behavior normal.         Wt Readings from Last 3 Encounters:   03/01/21 204 lb 12.8 oz (92.9 kg)   02/04/21 211 lb (95.7 kg)   01/07/21 201 lb 6.4 oz (91.4 kg)     BP Readings from Last 3 Encounters:   03/01/21 110/70   02/04/21 110/60   01/07/21 (!) 90/50     Pulse Readings from Last 3 Encounters:   03/01/21 64   02/04/21 73   01/07/21 71     Body mass index is 27.78 kg/m². ECHO:   2/17/21   Summary   Ejection fraction is visually estimated at 30%. There was moderate global hypokinesis of the left ventricle. Markedly enlarged right atrium size. Severly dilated right ventricle. Component Value Date     01/05/2021    K 3.7 01/05/2021    K 4.7 04/09/2018     01/05/2021    CO2 29.0 01/05/2021    BUN 12 01/05/2021    CREATININE 1.2 01/05/2021    LABGLOM 0.60 01/05/2021    LABGLOM 58 05/04/2018    GLUCOSE 131 01/05/2021    CALCIUM 8.5 01/05/2021     Hepatic Function Panel:    Lab Results   Component Value Date    ALKPHOS 111 12/13/2019    ALT 13 12/13/2019    AST 31 12/13/2019    PROT 6.9 12/13/2019    BILITOT 2.0 12/13/2019    BILIDIR 0.4 12/13/2019    IBILI 1.6 12/13/2019    LABALBU 4.4 12/13/2019     Magnesium:    Lab Results   Component Value Date    MG 2.3 12/03/2019     PT/INR:    Lab Results   Component Value Date    PROTIME 24.5 04/27/2018    INR 1.60 05/04/2018     Lipids:  No results found for: TRIG, HDL, LDLCALC, LDLDIRECT, LABVLDL    ASSESSMENT AND PLAN:   The patient's condition/symptoms are Stable      Diagnosis Orders   1. CHF (congestive heart failure), NYHA class II, chronic, systolic ([facility])  Basic Metabolic Panel   2. Permanent atrial fibrillation ([address]     3. ICD (implantable cardioverter-defibrillator), biventricular, in situ     4. NICM (nonischemic cardiomyopathy) ([facility])     -EF 30% RV and RA enlarged     Plan:  · GDMT   · ACE/ARB/ARNi: Entresto 24/26 mg bid - unable to titrate d/t lower BPs  · Beta Blocker: Metoprolol 25 mg bid  · Aldosterone antagonist: consider - will need to monitor BP and kidney function closely  · Diuretic/Potassium: Change Lasix 40 mg bid TO Torsemide 40 mg daily, keep Potassium 20 mEq bid; Metolazone 5 mg weekly on Wednesdays and PRN  · Hydralazine/Nitrate: no  · Other: Amiodarone, Digoxin, Coumadin   · Change Lasix to Torsemide as above in hopes to continue diuresis with continued SOB with activity, LE edema and penile/scrotal edema. BMP in 1 week. BPs lower 100's, denies any dizziness or lightheadedness. · HF Zones reviewed.   · Daily weights and record  · Fluid restriction of 2 Liters per day (64 oz) · Limit sodium in diet to 5762-7162 mg/day  · Healthier food options were discussed   · Monitor BP daily 1 hour after meds are taken  · Increase activity as tolerated     Patient was instructed to call the [address] for changes in the following symptoms:   ? Weight gain of 3 pounds in 1 day or 5 pounds in 1 week  ? Increased shortness of breath  ? Shortness of breath while laying down  ? Cough  ? Chest pain  ? Swelling in feet, ankles or legs  ? Tenderness or bloating in the abdomen  ? Fatigue   ? Decreased appetite or feeling \"full\"  ? Nausea   ? Confusion      Return in about 3 months (around 6/1/2021). or sooner if needed     Patient given educational materials - see patient instructions. We discussed the importance of weighing oneself and recording daily. We also discussed the importance of a low sodium diet, higher sodium foods to avoid and appropriate low sodium food choices. Discussed use, benefit, and side effects of prescribed medications. All patient questions answered. Patient verbalizes understanding of plan of care using teach back method, and is agreeable to the treatment plan. Greater then 30 minutes of time was spent reviewing the chart and educating the patient about HF, medications, diet, exercise, and discussing the plan of care. I personally spent more then 50% of the appt time face to face with the patient counseling/coordinating patient's care.       Electronicallysigned by FAITH Blackburn CNP on 3/1/2021 at 2:12 PM

## 2021-03-01 NOTE — PATIENT INSTRUCTIONS
You may receive a survey regarding the care you received during your visit. Your input is valuable to us. We encourage you to complete and return your survey. We hope you will choose us in the future for your healthcare needs. NEW ORDERS FROM TODAY:      Continue:  · Take all medications as prescribed   · Daily weights and record - please try to weigh yourself upon awakening before eating or drinking   · Fluid restriction of 2 Liters per day (64 oz)  · Limit sodium in diet to around 9525-5178 mg/day  · Monitor BP - take BP 1 hour after meds  · Increase activity as tolerated     Call the [facility] Failure Clinic for any of the following symptoms: [phone]  ? Weight gain of 3 pounds in 1 day or 5 pounds in 1 week  ? Increased shortness of breath  ? Shortness of breath while laying down  ? Cough  ? Chest pain  ? Swelling in feet, ankles or legs  ? Tenderness or bloating in the abdomen  ? Fatigue   ? Decreased appetite or feeling \"full\"   ? Nausea   ? Confusion     **PLEASE bring all medications in original bottles with you to your next appointment**    Educational websites:    http://[email or web address] org (American Heart Association)    [employer].nl. com (Entresto/Novartis)    [employer].com (CardioMEMS)    Too much sodium causes your body to hold on to extra water. This can raise your blood pressure and force your heart and kidneys to work harder. In very serious cases, this could cause you to be put in the hospital. It might even be life-threatening. By limiting sodium, you will feel better and lower your risk of serious problems. The most common source of sodium is salt. People get most of the salt in their diet from canned, prepared, and packaged foods. Fast food and restaurant meals also are very high in sodium. Your doctor will probably limit your sodium to less than 2,000 milligrams (mg) a day. This limit counts all the sodium in prepared and packaged foods and any salt you add to your food. Follow-up care is a key part of your treatment and safety. Be sure to make and go to all appointments, and call your doctor if you are having problems. It's also a good idea to know your test results and keep a list of the medicines you take. How can you care for yourself at home? Read food labels  · Read labels on cans and food packages. The labels tell you how much sodium is in each serving. Make sure that you look at the serving size. If you eat more than the serving size, you have eaten more sodium. · Food labels also tell you the Percent Daily Value for sodium. Choose products with low Percent Daily Values for sodium. · Be aware that sodium can come in forms other than salt, including monosodium glutamate (MSG), sodium citrate, and sodium bicarbonate (baking soda). MSG is often added to Asian food. When you eat out, you can sometimes ask for food without MSG or added salt. Buy low-sodium foods  · Buy foods that are labeled \"unsalted\" (no salt added), \"sodium-free\" (less than 5 mg of sodium per serving), or \"low-sodium\" (less than 140 mg of sodium per serving). Foods labeled \"reduced-sodium\" and \"light sodium\" may still have too much sodium. Be sure to read the label to see how much sodium you are getting. · Buy fresh vegetables, or frozen vegetables without added sauces. Buy low-sodium versions of canned vegetables, soups, and other canned goods. Prepare low-sodium meals  · Cut back on the amount of salt you use in cooking. This will help you adjust to the taste. Do not add salt after cooking. One teaspoon of salt has about 2,300 mg of sodium. · Take the salt shaker off the table. · Flavor your food with garlic, lemon juice, onion, vinegar, herbs, and spices. Do not use soy sauce, lite soy sauce, steak sauce, onion salt, garlic salt, celery salt, mustard, or ketchup on your food. · Use low-sodium salad dressings, sauces, and ketchup. Or make your own salad dressings and sauces without adding salt. · Use less salt (or none) when recipes call for it. You can often use half the salt a recipe calls for without losing flavor. Other foods such as rice, pasta, and grains do not need added salt. · Rinse canned vegetables, and cook them in fresh water. This removes somebut not allof the salt. · Avoid water that is naturally high in sodium or that has been treated with water softeners, which add sodium. Call your local water company to find out the sodium content of your water supply. If you buy bottled water, read the label and choose a sodium-free brand. Avoid high-sodium foods  · Avoid eating:  ? Smoked, cured, salted, and canned meat, fish, and poultry. ? Ham, wayne, hot dogs, and luncheon meats. ? Regular, hard, and processed cheese and regular peanut butter. ? Crackers with salted tops, and other salted snack foods such as pretzels, chips, and salted popcorn. ? Frozen prepared meals, unless labeled low-sodium. ? Canned and dried soups, broths, and bouillon, unless labeled sodium-free or low-sodium. ? Canned vegetables, unless labeled sodium-free or low-sodium. ? [place] fries, pizza, tacos, and other fast foods. ? Pickles, olives, ketchup, and other condiments, especially soy sauce, unless labeled sodium-free or low-sodium.

## 2021-03-09 ENCOUNTER — TELEPHONE (OUTPATIENT)
Dept: CARDIOLOGY CLINIC | Age: 81
End: 2021-03-09

## 2021-03-09 DIAGNOSIS — I50.22 CHF (CONGESTIVE HEART FAILURE), NYHA CLASS II, CHRONIC, SYSTOLIC (HCC): Primary | ICD-10-CM

## 2021-03-09 LAB
ALBUMIN SERPL-MCNC: 4 G/DL
ALP BLD-CCNC: 116 U/L
ALT SERPL-CCNC: 15 U/L
ANION GAP SERPL CALCULATED.3IONS-SCNC: ABNORMAL MMOL/L
AST SERPL-CCNC: 31 U/L
BILIRUB SERPL-MCNC: 1.1 MG/DL (ref 0.1–1.4)
BUN BLDV-MCNC: 32 MG/DL
CALCIUM SERPL-MCNC: 9.1 MG/DL
CHLORIDE BLD-SCNC: 103 MMOL/L
CHOLESTEROL, TOTAL: 155 MG/DL
CHOLESTEROL/HDL RATIO: 3
CO2: 30 MMOL/L
CREAT SERPL-MCNC: 2.6 MG/DL
GFR CALCULATED: 25
GLUCOSE BLD-MCNC: 112 MG/DL
HDLC SERPL-MCNC: 61 MG/DL (ref 35–70)
LDL CHOLESTEROL CALCULATED: 67 MG/DL (ref 0–160)
NONHDLC SERPL-MCNC: ABNORMAL MG/DL
POTASSIUM SERPL-SCNC: 3.8 MMOL/L
SODIUM BLD-SCNC: 143 MMOL/L
TOTAL PROTEIN: 6.8
TRIGL SERPL-MCNC: 136 MG/DL
VLDLC SERPL CALC-MCNC: 27 MG/DL

## 2021-03-09 RX ORDER — TORSEMIDE 20 MG/1
20 TABLET ORAL DAILY
Qty: 90 TABLET | Refills: 3 | Status: SHIPPED | OUTPATIENT
Start: 2021-03-09 | End: 2021-03-19

## 2021-03-09 RX ORDER — METOLAZONE 5 MG/1
5 TABLET ORAL DAILY PRN
Qty: 15 TABLET | Refills: 2
Start: 2021-03-09 | End: 2022-01-01 | Stop reason: ALTCHOICE

## 2021-03-09 RX ORDER — POTASSIUM CHLORIDE 20 MEQ/1
20 TABLET, EXTENDED RELEASE ORAL DAILY
Qty: 180 TABLET | Refills: 3
Start: 2021-03-09 | End: 2021-03-19

## 2021-03-09 NOTE — TELEPHONE ENCOUNTER
BMP shows worsening kidney function   Hold Torsemide x 2 days then decrease to 20 mg daily  Hold Potassium x 2 days then decrease to 20 mEq daily  Make Metolazone PRN for now - do not take unless directed  Repeat BMP on Friday

## 2021-03-09 NOTE — TELEPHONE ENCOUNTER
Patient wife, Jyoti Lin, called in with weights. Recent change from Lasix to Toresmide. 3/5- 196 lb  3/6-  196 lb  3/7- 195 lb  3/8- 196 lb  3/9- 197 lb    BP ranging 100s/60s. Can now sleep in bed, not in the chair. Feeling good. Lab work was done today. If continuing on Torsemide, OPTUMRX 90 day supply is requested.

## 2021-03-12 LAB
BUN BLDV-MCNC: 24 MG/DL
CALCIUM SERPL-MCNC: 9.2 MG/DL
CHLORIDE BLD-SCNC: 105 MMOL/L
CO2: 28 MMOL/L
CREAT SERPL-MCNC: 1.6 MG/DL
GFR CALCULATED: 44
GLUCOSE BLD-MCNC: 110 MG/DL
POTASSIUM SERPL-SCNC: 3.9 MMOL/L
SODIUM BLD-SCNC: 142 MMOL/L

## 2021-03-12 NOTE — TELEPHONE ENCOUNTER
Neelam Limon resuming the Torsemide should help  I will review BMP results once we get them and decide on further POC

## 2021-03-15 NOTE — TELEPHONE ENCOUNTER
Juanita Quinones notified and verbalized understanding. Held Dimitrios Wed and Thurs Friday 201lb  Sat 201 lb  Sun 201 lb  Mon 203 lb. Slept good last night, denies SOB, or bloating, no penis swelling.

## 2021-03-18 NOTE — TELEPHONE ENCOUNTER
Metolazone 5 mg today followed by Torsemide 10 mg with an extra Potassium 20 mEq today  Call tomorrow before 10 am with update on weight

## 2021-03-18 NOTE — TELEPHONE ENCOUNTER
Wife called in with wt gain  C/o SOB with walking and some ANDREW    3/14 201  3/15 203  3/16 204  3/17 206  3/18 208

## 2021-03-19 RX ORDER — TORSEMIDE 20 MG/1
30 TABLET ORAL DAILY
Qty: 90 TABLET | Refills: 3
Start: 2021-03-19 | End: 2021-05-19 | Stop reason: SDUPTHER

## 2021-03-19 RX ORDER — POTASSIUM CHLORIDE 20 MEQ/1
30 TABLET, EXTENDED RELEASE ORAL DAILY
Qty: 180 TABLET | Refills: 3
Start: 2021-03-19 | End: 2022-01-01

## 2021-03-19 NOTE — TELEPHONE ENCOUNTER
Increase Torsemide to 30 mg daily and Potassium 30 mEq daily  We will check a BMP at his appt with me on Mar 24

## 2021-03-24 ENCOUNTER — OFFICE VISIT (OUTPATIENT)
Dept: CARDIOLOGY CLINIC | Age: 81
End: 2021-03-24
Payer: MEDICARE

## 2021-03-24 VITALS
BODY MASS INDEX: 27.28 KG/M2 | OXYGEN SATURATION: 97 % | HEART RATE: 72 BPM | SYSTOLIC BLOOD PRESSURE: 110 MMHG | WEIGHT: 201.4 LBS | DIASTOLIC BLOOD PRESSURE: 60 MMHG | HEIGHT: 72 IN

## 2021-03-24 DIAGNOSIS — Q82.0 HEREDITARY LYMPHEDEMA: ICD-10-CM

## 2021-03-24 DIAGNOSIS — I48.21 PERMANENT ATRIAL FIBRILLATION (HCC): ICD-10-CM

## 2021-03-24 DIAGNOSIS — R60.0 BILATERAL LEG EDEMA: ICD-10-CM

## 2021-03-24 DIAGNOSIS — I42.8 NICM (NONISCHEMIC CARDIOMYOPATHY) (HCC): ICD-10-CM

## 2021-03-24 DIAGNOSIS — I50.22 CHF (CONGESTIVE HEART FAILURE), NYHA CLASS II, CHRONIC, SYSTOLIC (HCC): Primary | ICD-10-CM

## 2021-03-24 DIAGNOSIS — Z95.810 ICD (IMPLANTABLE CARDIOVERTER-DEFIBRILLATOR) IN PLACE: ICD-10-CM

## 2021-03-24 PROCEDURE — 1123F ACP DISCUSS/DSCN MKR DOCD: CPT | Performed by: NURSE PRACTITIONER

## 2021-03-24 PROCEDURE — 4040F PNEUMOC VAC/ADMIN/RCVD: CPT | Performed by: NURSE PRACTITIONER

## 2021-03-24 PROCEDURE — 1036F TOBACCO NON-USER: CPT | Performed by: NURSE PRACTITIONER

## 2021-03-24 PROCEDURE — G8417 CALC BMI ABV UP PARAM F/U: HCPCS | Performed by: NURSE PRACTITIONER

## 2021-03-24 PROCEDURE — 99214 OFFICE O/P EST MOD 30 MIN: CPT | Performed by: NURSE PRACTITIONER

## 2021-03-24 PROCEDURE — G8482 FLU IMMUNIZE ORDER/ADMIN: HCPCS | Performed by: NURSE PRACTITIONER

## 2021-03-24 PROCEDURE — G8427 DOCREV CUR MEDS BY ELIG CLIN: HCPCS | Performed by: NURSE PRACTITIONER

## 2021-03-24 ASSESSMENT — ENCOUNTER SYMPTOMS
NAUSEA: 0
WHEEZING: 0
COLOR CHANGE: 0
SHORTNESS OF BREATH: 1
CHEST TIGHTNESS: 0
ABDOMINAL DISTENTION: 0
COUGH: 0
ABDOMINAL PAIN: 0
APNEA: 0

## 2021-03-24 NOTE — PROGRESS NOTES
[address]       Visit Date: 3/24/2021  Cardiologist:  Dr. Je Jiménez  Primary Care Physician: Dr. Rachel Waller is a 78 y.o. male who presents today for:  Chief Complaint   Patient presents with    Congestive Heart Failure       HPI: Obtained from patient and chart    Feli Higginbotham is a 78 y.o. male who presents to the office for a follow up visit in the heart failure clinic. Hx A fib, BiV ICD, NICM EF 30% (2021) previous 20% (2018), previous smoker (pipe). He was admitted to [facility] in [address] with COVID symptoms. Started on Remeron at that time for anxiety and trouble sleeping. He had noticed an increase in swelling since then. However this is the time he was d/c from the hospital. He c/o PND. He went to the ER on 1/5/21 as he could not urinate d/t his penile and scrotal swelling. He received IV diuresis at his first OV with me 1/7/21 and also given Metolazone and had good results. On Jan 25 he received the COVID vaccine and he had a reaction of fever, was admitted to [facility] had fluid overload his weight was up 10 pounds in 3 days with significant LE edema. He stated he penis and scrotum are swollen, tired and SOB with activity with PND. He was again given IV Lasix on 2/4/21 at the [facility] with me with good results. We changed from Bumex to Lasix and gave Metolazone at his last OV d/t penile and scrotal swelling, PND and SOB with any actiivty. His kidney function worsened so his dose was changed. Today he has no penile or scrotal swelling. He does still have a rosas catheter, is making good urine. Urology planning to perform surgery, sounds like urostomy? He can perform ADLs without SOB  Or fatigue. And he denies any further PND, sleeps all night in the bed. He does still have LE edema despite wearing compression socks.        Accompanied by: wife  Last hospital admission related to Heart Failure:  Nov 2020  Chest Pain: no  Worsening SOB: improving Worsening Orthopnea/PND: improved  Edema: yes  Any extra diuretic use: see hpi  Weight gain: no  Fatigue: improved  Abdominal bloating: improved  Appetite: good  SHARA: no  Cough: no  Compliant checking home weight: yes 193-196 lbs  Compliant checking blood pressure: yes 110's HR 60      Past Medical History:   Diagnosis Date    Arthritis     Atrial fibrillation [facility])     [employer] single ICD 1/6/2016    Cardiomyopathy ([address]     Cardiomyopathy, likely nonischemic based on the patient's description. However, don't have a cardiac cath report from [facility].  Hypertension     ICD (implantable cardiac defibrillator) battery depletion: 9/24/2013: Generator replacement using Stilnest 9/24/2013 9/24/2013: Generator replacement using Stilnest device. Atrial lead was capped. Dr. Misael Armenta ICD (implantable cardiac defibrillator), dual, in situ     [employer] dual ICD    Low blood pressure     Low blood pressure with rapid atrial fibrillation.  Prolonged emergence from general anesthesia      Past Surgical History:   Procedure Laterality Date    CHOLECYSTECTOMY      COLONOSCOPY      DOPPLER ECHOCARDIOGRAPHY  7 08 2010    Global LV dilatation and dysfunction. EF 35%. Moderate biatrial enlargement. Mild mitral regurgitation and mild tricuspid regurgitation. No obvious stenotic valves. Borderline to mild pulmonary hypertension. No pericardial effusion.  HAND SURGERY  06/26/2017   Callie Officer HERNIA REPAIR  [address], [place].    OTHER SURGICAL HISTORY Left 01/20/2017    Left CMC arthroplasty    PACEMAKER PLACEMENT      UPPER GASTROINTESTINAL ENDOSCOPY  12/2016    with dilation in [facility]     Family History   Problem Relation Age of Onset    Alzheimer's Disease Father     Cancer Brother         Brother had throat cancer.  Heart Disease Brother         Another brother had heart disease.      Pacemaker Brother      Social History     Tobacco Use    Smoking status: Former Smoker Types: Cigarettes     Quit date: 1989     Years since quittin.2    Smokeless tobacco: Former User     Types: Chew    Tobacco comment: Patient states, Fadia Cramer smokes a pipe occasionally. Substance Use Topics    Alcohol use: No     Current Outpatient Medications   Medication Sig Dispense Refill    torsemide (DEMADEX) 20 MG tablet Take 1.5 tablets by mouth daily 90 tablet 3    potassium chloride (KLOR-CON M) 20 MEQ extended release tablet Take 1.5 tablets by mouth daily 180 tablet 3    metOLazone (ZAROXOLYN) 5 MG tablet Take 1 tablet by mouth daily as needed (as directed by HF Clinic) And PRN as directed by HF clinic 15 tablet 2    mirtazapine (REMERON) 15 MG tablet Take 15 mg by mouth nightly      amiodarone (CORDARONE) 200 MG tablet Take 1 tablet daily. 90 tablet 3    digoxin (LANOXIN) 125 MCG tablet TAKE 1 TABLET BY MOUTH  DAILY 90 tablet 3    metoprolol tartrate (LOPRESSOR) 25 MG tablet TAKE ONE-HALF TABLET BY  MOUTH TWICE A DAY 90 tablet 3    sacubitril-valsartan (ENTRESTO) 24-26 MG per tablet Take 1 tablet by mouth 2 times daily 180 tablet 3    finasteride (PROSCAR) 5 MG tablet Take 5 mg by mouth daily       tamsulosin (FLOMAX) 0.4 MG capsule Take 0.4 mg by mouth 2 times daily      warfarin (COUMADIN) 5 MG tablet Take 5 mg by mouth every evening      Omega-3 Fatty Acids (FISH OIL) 1000 MG CAPS Take 2,000 mg by mouth 2 times daily      therapeutic multivitamin-minerals (THERAGRAN-M) tablet Take 1 tablet by mouth daily. No current facility-administered medications for this visit. No Known Allergies    SUBJECTIVE:   Review of Systems   Constitutional: Negative for activity change, appetite change, fatigue and fever. HENT: Negative for congestion. Respiratory: Positive for shortness of breath. Negative for apnea, cough, chest tightness and wheezing. Cardiovascular: Positive for leg swelling. Negative for chest pain and palpitations.    Gastrointestinal: Negative for abdominal distention, abdominal pain and nausea. Genitourinary: Negative for difficulty urinating and dysuria. Musculoskeletal: Positive for gait problem (cane). Negative for arthralgias. Skin: Negative for color change. Neurological: Negative for dizziness, numbness and headaches. Psychiatric/Behavioral: Negative for agitation, confusion and sleep disturbance. The patient is not nervous/anxious. OBJECTIVE:   Today's Vitals:  /60   Pulse 72   Ht 6' (1.829 m)   Wt 201 lb 6.4 oz (91.4 kg)   SpO2 97%   BMI 27.31 kg/m²     Physical Exam  Vitals signs reviewed. Constitutional:       Appearance: He is well-developed. HENT:      Head: Normocephalic and atraumatic. Eyes:      Pupils: Pupils are equal, round, and reactive to light. Neck:      Musculoskeletal: Normal range of motion. Vascular: No JVD. Cardiovascular:      Rate and Rhythm: Normal rate and regular rhythm. Heart sounds: Normal heart sounds. No murmur. Comments: BiV IVD  Pulmonary:      Effort: Pulmonary effort is normal. No respiratory distress. Breath sounds: No rales. Abdominal:      General: There is no distension. Palpations: Abdomen is soft. Tenderness: There is no abdominal tenderness. Musculoskeletal:         General: No tenderness. Right lower leg: Edema (2+) present. Left lower leg: Edema (2+) present. Skin:     General: Skin is warm and dry. Capillary Refill: Capillary refill takes less than 2 seconds. Neurological:      Mental Status: He is alert and oriented to person, place, and time.    Psychiatric:         Mood and Affect: Mood normal.         Behavior: Behavior normal.         Wt Readings from Last 3 Encounters:   03/24/21 201 lb 6.4 oz (91.4 kg)   03/01/21 204 lb 12.8 oz (92.9 kg)   02/04/21 211 lb (95.7 kg)     BP Readings from Last 3 Encounters:   03/24/21 110/60   03/01/21 110/70   02/04/21 110/60     Pulse Readings from Last 3 Encounters:   03/24/21 72 03/01/21 64   02/04/21 73     Body mass index is 27.31 kg/m². ECHO:   2/17/21   Summary   Ejection fraction is visually estimated at 30%. There was moderate global hypokinesis of the left ventricle. Markedly enlarged right atrium size. Severly dilated right ventricle. Aortic valve appears tricuspid. Aortic valve leaflets are somewhat thickened. Mild aortic stenosis is present. Mild-to-moderate tricuspid regurgitation. Signature      ----------------------------------------------------------------   Electronically signed by Tahmina Tristan MD (Interpreting   physician) on 02/17/2021 at 04:20 PM   ----------------------------------------------------------------      Findings      Mitral Valve   The mitral valve structure was normal with normal leaflet separation. DOPPLER: The transmitral velocity was within the normal range with no   evidence for mitral stenosis. There was no evidence of mitral   regurgitation. Aortic Valve   Aortic valve appears tricuspid. Aortic valve leaflets are somewhat thickened. Mild aortic stenosis is present. Tricuspid Valve   Mild-to-moderate tricuspid regurgitation. Pulmonic Valve   The pulmonic valve was not well visualized . Trivial pulmonic regurgitation visualized. Left Atrium   Left atrial size was normal.      Left Ventricle   Ejection fraction is visually estimated at 30%. There was moderate global hypokinesis of the left ventricle. Right Atrium   Markedly enlarged right atrium size. Right Ventricle   Severly dilated right ventricle. Pericardial Effusion   The pericardium was normal in appearance with no evidence of a pericardial   effusion. Pleural Effusion   No evidence of pleural effusion. Aorta / Great Vessels   -Aortic root dimension within normal limits.   -The Pulmonary artery is within normal limits. -IVC size is within normal limits with normal respiratory phasic changes.          Results reviewed:  BNP:   Lab Results   Component Value Date    PROBNP 756.3 01/07/2021     CBC:   Lab Results   Component Value Date    WBC 5.1 01/05/2021    RBC 3.56 01/05/2021    HGB 11.3 01/05/2021    HCT 35.4 01/05/2021     01/05/2021     CMP:    Lab Results   Component Value Date     03/12/2021    K 3.9 03/12/2021    K 4.7 04/09/2018     03/12/2021    CO2 28.0 03/12/2021    BUN 24 03/12/2021    CREATININE 1.6 03/12/2021    LABGLOM 44 03/12/2021    LABGLOM 58 05/04/2018    GLUCOSE 110 03/12/2021    CALCIUM 9.2 03/12/2021     Hepatic Function Panel:    Lab Results   Component Value Date    ALKPHOS 116 03/09/2021    ALT 15 03/09/2021    AST 31 03/09/2021    PROT 6.9 12/13/2019    BILITOT 1.1 03/09/2021    BILIDIR 0.4 12/13/2019    IBILI 1.6 12/13/2019    LABALBU 4.0 03/09/2021     Magnesium:    Lab Results   Component Value Date    MG 2.3 12/03/2019     PT/INR:    Lab Results   Component Value Date    PROTIME 24.5 04/27/2018    INR 1.60 05/04/2018     Lipids:    Lab Results   Component Value Date    TRIG 136 03/09/2021    HDL 61 03/09/2021    LDLCALC 67 03/09/2021       ASSESSMENT AND PLAN:   The patient's condition/symptoms are Stable      Diagnosis Orders   1. CHF (congestive heart failure), NYHA class II, chronic, systolic ([address]     2. Permanent atrial fibrillation [address]     3. NICM (nonischemic cardiomyopathy) ([facility]     4. ICD (implantable cardioverter-defibrillator) in place     5. Bilateral leg edema     6. Hereditary lymphedema  External Referral To Physical Therapy       Plan:  · GDMT   · ACE/ARB/ARNi: Entresto 24/26 mg bid - unable to titrate d/t kidney function and lower BPs  · Beta Blocker: Metoprolol 25 mg bid  · Aldosterone antagonist: no  · Diuretic/Potassium: Torsemide 30 mg daily, Potassium 30 mEq daily, Metolazone 5 mg PRN  · Hydralazine/Nitrate: no  · Other: Amiodarone, Lanoxin, Coumadin   · SOB, PND and penile/scrotal swelling have improved. He does still have significant LE edema. He is wearing compression socks. He has went to the Lymphedema in the past, he and his wife think 4 years ago. We will refer back. They wish to go to [place] due to it being closer to their home. · HF Zones reviewed. · Daily weights and record  · Fluid restriction of 2 Liters per day (64 oz)   · Limit sodium in diet to 0911-7331 mg/day  · Healthier food options were discussed   · Monitor BP daily 1 hour after meds are taken  · Increase activity as tolerated     Patient was instructed to call the [employer] for changes in the following symptoms:    Weight gain of 3 pounds in 1 day or 5 pounds in 1 week   Increased shortness of breath   Shortness of breath while laying down   Cough   Chest pain   Swelling in feet, ankles or legs   Tenderness or bloating in the abdomen   Fatigue    Decreased appetite or feeling \"full\"   Nausea    Confusion      Return in about 3 months (around 6/24/2021). or sooner if needed     Patient given educational materials - see patient instructions. We discussed the importance of weighing oneself and recording daily. We also discussed the importance of a low sodium diet, higher sodium foods to avoid and appropriate low sodium food choices. Discussed use, benefit, and side effects of prescribed medications. All patient questions answered. Patient verbalizes understanding of plan of care using teach back method, and is agreeable to the treatment plan. Greater then 30 minutes of time was spent reviewing the chart and educating the patient about HF, medications, diet, exercise, and discussing the plan of care. I personally spent more then 50% of the appt time face to face with the patient counseling/coordinating patient's care.       Electronicallysigned by FAITH Erickson CNP on 3/24/2021 at 3:14 PM

## 2021-03-24 NOTE — PATIENT INSTRUCTIONS
You may receive a survey regarding the care you received during your visit. Your input is valuable to us. We encourage you to complete and return your survey. We hope you will choose us in the future for your healthcare needs. NEW ORDERS FROM TODAY:      Continue:  · Take all medications as prescribed   · Daily weights and record - please try to weigh yourself upon awakening before eating or drinking   · Fluid restriction of 2 Liters per day (64 oz)  · Limit sodium in diet to around 3156-7385 mg/day  · Monitor BP - take BP 1 hour after meds  · Increase activity as tolerated     Call the [facility] for any of the following symptoms: [phone]   Weight gain of 3 pounds in 1 day or 5 pounds in 1 week   Increased shortness of breath   Shortness of breath while laying down   Cough   Chest pain   Swelling in feet, ankles or legs   Tenderness or bloating in the abdomen   Fatigue    Decreased appetite or feeling \"full\"    Nausea    Confusion     **PLEASE bring all medications in original bottles with you to your next appointment**    Educational websites:    http://www.Bambeco/. org (American Heart Association)    Promotionalsilkfred.nl. com (Entresto/Novartis)    Generous Deals HF.com ([employer])    Too much sodium causes your body to hold on to extra water. This can raise your blood pressure and force your heart and kidneys to work harder. In very serious cases, this could cause you to be put in the hospital. It might even be life-threatening. By limiting sodium, you will feel better and lower your risk of serious problems. The most common source of sodium is salt. People get most of the salt in their diet from canned, prepared, and packaged foods. Fast food and restaurant meals also are very high in sodium. Your doctor will probably limit your sodium to less than 2,000 milligrams (mg) a day. This limit counts all the sodium in prepared and packaged foods and any salt you add to your food. Follow-up care is a key part of your treatment and safety. Be sure to make and go to all appointments, and call your doctor if you are having problems. It's also a good idea to know your test results and keep a list of the medicines you take. How can you care for yourself at home? Read food labels  · Read labels on cans and food packages. The labels tell you how much sodium is in each serving. Make sure that you look at the serving size. If you eat more than the serving size, you have eaten more sodium. · Food labels also tell you the Percent Daily Value for sodium. Choose products with low Percent Daily Values for sodium. · Be aware that sodium can come in forms other than salt, including monosodium glutamate (MSG), sodium citrate, and sodium bicarbonate (baking soda). MSG is often added to Asian food. When you eat out, you can sometimes ask for food without MSG or added salt. Buy low-sodium foods  · Buy foods that are labeled \"unsalted\" (no salt added), \"sodium-free\" (less than 5 mg of sodium per serving), or \"low-sodium\" (less than 140 mg of sodium per serving). Foods labeled \"reduced-sodium\" and \"light sodium\" may still have too much sodium. Be sure to read the label to see how much sodium you are getting. · Buy fresh vegetables, or frozen vegetables without added sauces. Buy low-sodium versions of canned vegetables, soups, and other canned goods. Prepare low-sodium meals  · Cut back on the amount of salt you use in cooking. This will help you adjust to the taste. Do not add salt after cooking. One teaspoon of salt has about 2,300 mg of sodium. · Take the salt shaker off the table. · Flavor your food with garlic, lemon juice, onion, vinegar, herbs, and spices. Do not use soy sauce, lite soy sauce, steak sauce, onion salt, garlic salt, celery salt, mustard, or ketchup on your food. · Use low-sodium salad dressings, sauces, and ketchup. Or make your own salad dressings and sauces without adding salt.   · Use less salt (or none) when recipes call for it. You can often use half the salt a recipe calls for without losing flavor. Other foods such as rice, pasta, and grains do not need added salt. · Rinse canned vegetables, and cook them in fresh water. This removes somebut not allof the salt. · Avoid water that is naturally high in sodium or that has been treated with water softeners, which add sodium. Call your local water company to find out the sodium content of your water supply. If you buy bottled water, read the label and choose a sodium-free brand. Avoid high-sodium foods  · Avoid eating:  ? Smoked, cured, salted, and canned meat, fish, and poultry. ? Ham, wayne, hot dogs, and luncheon meats. ? Regular, hard, and processed cheese and regular peanut butter. ? Crackers with salted tops, and other salted snack foods such as pretzels, chips, and salted popcorn. ? Frozen prepared meals, unless labeled low-sodium. ? Canned and dried soups, broths, and bouillon, unless labeled sodium-free or low-sodium. ? Canned vegetables, unless labeled sodium-free or low-sodium. ? [place] fries, pizza, tacos, and other fast foods. ? Pickles, olives, ketchup, and other condiments, especially soy sauce, unless labeled sodium-free or low-sodium.

## 2021-03-31 ENCOUNTER — PROCEDURE VISIT (OUTPATIENT)
Dept: CARDIOLOGY CLINIC | Age: 81
End: 2021-03-31
Payer: MEDICARE

## 2021-03-31 DIAGNOSIS — I50.22 CHF (CONGESTIVE HEART FAILURE), NYHA CLASS II, CHRONIC, SYSTOLIC (HCC): Primary | ICD-10-CM

## 2021-03-31 PROCEDURE — G2066 INTER DEVC REMOTE 30D: HCPCS | Performed by: NUCLEAR MEDICINE

## 2021-03-31 PROCEDURE — 93297 REM INTERROG DEV EVAL ICPMS: CPT | Performed by: NUCLEAR MEDICINE

## 2021-04-05 ENCOUNTER — TELEPHONE (OUTPATIENT)
Dept: CARDIOLOGY CLINIC | Age: 81
End: 2021-04-05

## 2021-04-05 NOTE — TELEPHONE ENCOUNTER
WESLEY 12/15/20  Melly, from Dr Carlos Colby office asked for a pre-op clearance appt \"soon\" with Dr Juan F Garcia. No date set yet for a suprapubic tube placement to be done at [facility]. I asked her to send records and a pre-op form --- their office doesn't have a form for that.   Phone [phone],  Fax [phone]

## 2021-04-27 ENCOUNTER — TELEPHONE (OUTPATIENT)
Dept: CARDIOLOGY CLINIC | Age: 81
End: 2021-04-27

## 2021-04-27 NOTE — TELEPHONE ENCOUNTER
Patient wife called in saying patient seems more SOB. He used to be able to sleep all night in bed without any issues, now he is waking about 4am and going to recliner, feels he cant breathe very well. Patient also seems to be more SOB when walking than usual.  Weight stable 194-196 lb. Denies any swelling, says legs look the best they have in awhile.

## 2021-04-29 NOTE — TELEPHONE ENCOUNTER
Weight yesterday 193 lb  Today weight 187 lb  Slept real good both nights. SOB a lot better than it was. Leg swelling much better.

## 2021-05-05 ENCOUNTER — PROCEDURE VISIT (OUTPATIENT)
Dept: CARDIOLOGY CLINIC | Age: 81
End: 2021-05-05
Payer: MEDICARE

## 2021-05-05 DIAGNOSIS — Z95.810 ICD (IMPLANTABLE CARDIOVERTER-DEFIBRILLATOR), BIVENTRICULAR, IN SITU: Primary | ICD-10-CM

## 2021-05-05 PROCEDURE — 93296 REM INTERROG EVL PM/IDS: CPT | Performed by: NUCLEAR MEDICINE

## 2021-05-05 PROCEDURE — 93295 DEV INTERROG REMOTE 1/2/MLT: CPT | Performed by: NUCLEAR MEDICINE

## 2021-05-05 NOTE — PROGRESS NOTES
[facility] biv icd   Programmed VVIR     . [facility] Battery longevity:  2.6-2.9 years on device   Presenting rhythm  At [facility] biv paced   corvue WNL    Atrial impedance 380  RV impedance 280    Shock 49    P wave sensing not measured   R wave sensing 8.4    100 % RV paced     RV threshold 0.75 V at 0.5ms  LV threshold not measured   Mode switches   100% coumadin

## 2021-05-17 ENCOUNTER — TELEPHONE (OUTPATIENT)
Dept: CARDIOLOGY CLINIC | Age: 81
End: 2021-05-17

## 2021-05-17 NOTE — TELEPHONE ENCOUNTER
Wife requested CHF appointment for 5-18-21 to be cancelled. Wife wanted patient to see Dr. Pastor Kaur on 5-18-21 instead of CHF due to patient being discharged from [facility] today.

## 2021-05-18 ENCOUNTER — OFFICE VISIT (OUTPATIENT)
Dept: CARDIOLOGY CLINIC | Age: 81
End: 2021-05-18
Payer: MEDICARE

## 2021-05-18 VITALS
BODY MASS INDEX: 25.09 KG/M2 | HEART RATE: 50 BPM | SYSTOLIC BLOOD PRESSURE: 102 MMHG | DIASTOLIC BLOOD PRESSURE: 60 MMHG | WEIGHT: 185 LBS

## 2021-05-18 DIAGNOSIS — I42.0 DILATED CARDIOMYOPATHY (HCC): Primary | ICD-10-CM

## 2021-05-18 DIAGNOSIS — I10 ESSENTIAL HYPERTENSION: ICD-10-CM

## 2021-05-18 DIAGNOSIS — I48.21 PERMANENT ATRIAL FIBRILLATION (HCC): ICD-10-CM

## 2021-05-18 PROCEDURE — 1123F ACP DISCUSS/DSCN MKR DOCD: CPT | Performed by: NUCLEAR MEDICINE

## 2021-05-18 PROCEDURE — G8427 DOCREV CUR MEDS BY ELIG CLIN: HCPCS | Performed by: NUCLEAR MEDICINE

## 2021-05-18 PROCEDURE — 1036F TOBACCO NON-USER: CPT | Performed by: NUCLEAR MEDICINE

## 2021-05-18 PROCEDURE — 99214 OFFICE O/P EST MOD 30 MIN: CPT | Performed by: NUCLEAR MEDICINE

## 2021-05-18 PROCEDURE — 4040F PNEUMOC VAC/ADMIN/RCVD: CPT | Performed by: NUCLEAR MEDICINE

## 2021-05-18 PROCEDURE — G8417 CALC BMI ABV UP PARAM F/U: HCPCS | Performed by: NUCLEAR MEDICINE

## 2021-05-18 RX ORDER — ACETAMINOPHEN 325 MG/1
650 TABLET ORAL EVERY 6 HOURS PRN
COMMUNITY

## 2021-05-18 NOTE — PROGRESS NOTES
[address] [facility]  [address] [facility],4[address] Floor [address] [facility]  Dept: [phone]  Dept Fax: [phone]  Loc: [phone]    Visit Date: 5/18/2021    Love Antunez is a 78 y.o. male who presents todayfor:  Chief Complaint   Patient presents with    Check-Up    Coronary Artery Disease    Congestive Heart Failure    Cardiomyopathy   had some urological surgery   Had a drop in the BP   Was in the ICU for a while  Doing better now   Known ICD and CMP   Slow recovery   Some ambulation  Known A fib that is stable   No RVR   No dizziness  No syncope  No ICD shocks  Off of Entresto due to the low BP      HPI:  HPI  Past Medical History:   Diagnosis Date    Arthritis     Atrial fibrillation [facility])     [employer] single ICD 1/6/2016    Cardiomyopathy ([facility] [address]     Cardiomyopathy, likely nonischemic based on the patient's description. However, don't have a cardiac cath report from [facility].  Hypertension     ICD (implantable cardiac defibrillator) battery depletion: 9/24/2013: Generator replacement using [employer] 9/24/2013 9/24/2013: Generator replacement using [employer] device. Atrial lead was capped. Dr. Patricia Patterson ICD (implantable cardiac defibrillator), dual, in situ     [employer] dual ICD    Low blood pressure     Low blood pressure with rapid atrial fibrillation.  Prolonged emergence from general anesthesia       Past Surgical History:   Procedure Laterality Date    CHOLECYSTECTOMY      COLONOSCOPY      DOPPLER ECHOCARDIOGRAPHY  7 08 2010    Global LV dilatation and dysfunction. EF 35%. Moderate biatrial enlargement. Mild mitral regurgitation and mild tricuspid regurgitation. No obvious stenotic valves. Borderline to mild pulmonary hypertension. No pericardial effusion.     HAND SURGERY  06/26/2017   Batsheva Sinner HERNIA REPAIR  [address] [place], [place].    OTHER SURGICAL HISTORY Left 01/20/2017    Left [facility] arthroplasty    PACEMAKER PLACEMENT      UPPER GASTROINTESTINAL ENDOSCOPY  2016    with dilation in [place]     Family History   Problem Relation Age of Onset    Alzheimer's Disease Father     Cancer Brother         Brother had throat cancer.  Heart Disease Brother         Another brother had heart disease.  Pacemaker Brother      Social History     Tobacco Use    Smoking status: Former Smoker     Types: Cigarettes     Quit date: 1989     Years since quittin.3    Smokeless tobacco: Former User     Types: Chew    Tobacco comment: Patient states, Gigi Class smokes a pipe occasionally. Substance Use Topics    Alcohol use: No      Current Outpatient Medications   Medication Sig Dispense Refill    acetaminophen (TYLENOL) 325 MG tablet Take 650 mg by mouth every 6 hours as needed for Pain      torsemide (DEMADEX) 20 MG tablet Take 1.5 tablets by mouth daily 90 tablet 3    potassium chloride (KLOR-CON M) 20 MEQ extended release tablet Take 1.5 tablets by mouth daily 180 tablet 3    mirtazapine (REMERON) 15 MG tablet Take 15 mg by mouth nightly      amiodarone (CORDARONE) 200 MG tablet Take 1 tablet daily. 90 tablet 3    metoprolol tartrate (LOPRESSOR) 25 MG tablet TAKE ONE-HALF TABLET BY  MOUTH TWICE A DAY 90 tablet 3    finasteride (PROSCAR) 5 MG tablet Take 5 mg by mouth daily       tamsulosin (FLOMAX) 0.4 MG capsule Take 0.4 mg by mouth 2 times daily      warfarin (COUMADIN) 5 MG tablet Take 5 mg by mouth every evening      Omega-3 Fatty Acids (FISH OIL) 1000 MG CAPS Take 2,000 mg by mouth 2 times daily      therapeutic multivitamin-minerals (THERAGRAN-M) tablet Take 1 tablet by mouth daily.         metOLazone (ZAROXOLYN) 5 MG tablet Take 1 tablet by mouth daily as needed (as directed by HF Clinic) And PRN as directed by HF clinic (Patient not taking: Reported on 2021) 15 tablet 2    sacubitril-valsartan (ENTRESTO) 24-26 MG per tablet Take 1 tablet by mouth 2 times daily (Patient not taking: Reported on 5/18/2021) 180 tablet 3     No current facility-administered medications for this visit. No Known Allergies  Health Maintenance   Topic Date Due    DTaP/Tdap/Td vaccine (1 - Tdap) Never done    Shingles Vaccine (1 of 2) Never done    Pneumococcal 65+ years Vaccine (2 of 2 - PPSV23) 02/05/2016    Annual Wellness Visit (AWV)  Never done    TSH testing  12/13/2020    COVID-19 Vaccine (2 - Pfizer 2-dose series) 02/15/2021    Potassium monitoring  03/12/2022    Creatinine monitoring  03/12/2022    Flu vaccine  Completed    Hepatitis A vaccine  Aged Out    Hepatitis B vaccine  Aged Out    Hib vaccine  Aged Out    Meningococcal (ACWY) vaccine  Aged Out       Subjective:  Review of Systems  General:   No fever, no chills,some fatigue or weight loss  Pulmonary:   some dyspnea, no wheezing  Cardiac:    Denies recent chest pain,   GI:     No nausea or vomiting, no abdominal pain  Neuro:     No dizziness or light headedness,   Musculoskeletal:  No recent active issues  Extremities:   No edema, no obvious claudication       Objective:  Physical Exam  /60   Pulse 50   Wt 185 lb (83.9 kg) Comment: home weight  BMI 25.09 kg/m²   General:   Well developed, well nourished  Lungs:   Clear to auscultation  Heart:    Normal S1 S2, Slight murmur. no rubs, no gallops  Abdomen:   Soft, non tender, no organomegalies, positive bowel sounds  Extremities:   No edema, no cyanosis, good peripheral pulses  Neurological:   Awake, alert, oriented. No obvious focal deficits  Musculoskelatal:  No obvious deformities    Assessment:      Diagnosis Orders   1. Dilated cardiomyopathy ([address]     2. Essential hypertension     3. Permanent atrial fibrillation (HCC)     low BP  Off of entresto   Might not be able to tolerate it yet   Very poor prognosis     Plan:  No follow-ups on file.   Follow the CBC  Resume entresto once BP is better  Continue risk factor modification and medical management  Thank you for allowing me to participate in the care of your patient. Please don't hesitate to contact me regarding any further issues related to the patient care      Orders Placed:  No orders of the defined types were placed in this encounter. Medications Prescribed:  No orders of the defined types were placed in this encounter. Discussed use, benefit, and side effects of prescribed medications. All patient questions answered. Pt voicedunderstanding. Instructed to continue current medications, diet and exercise. Continue risk factor modification and medical management. Patient agreed with treatment plan. Follow up as directed.     Electronically signedby Keron Tang MD on 5/18/2021 at 11:53 AM

## 2021-05-19 ENCOUNTER — TELEPHONE (OUTPATIENT)
Dept: CARDIOLOGY CLINIC | Age: 81
End: 2021-05-19

## 2021-05-19 RX ORDER — TORSEMIDE 20 MG/1
30 TABLET ORAL DAILY
Qty: 135 TABLET | Refills: 3 | Status: SHIPPED | OUTPATIENT
Start: 2021-05-19 | End: 2022-01-01 | Stop reason: SDUPTHER

## 2021-05-20 NOTE — TELEPHONE ENCOUNTER
Spoke with wife   Wt today 202  Still some swelling   SOB with walking  Denies orthopnea and PND    bp 110/61 HR 62

## 2021-06-02 ENCOUNTER — OFFICE VISIT (OUTPATIENT)
Dept: CARDIOLOGY CLINIC | Age: 81
End: 2021-06-02
Payer: MEDICARE

## 2021-06-02 VITALS
OXYGEN SATURATION: 97 % | BODY MASS INDEX: 27.06 KG/M2 | DIASTOLIC BLOOD PRESSURE: 64 MMHG | HEART RATE: 66 BPM | SYSTOLIC BLOOD PRESSURE: 112 MMHG | HEIGHT: 72 IN | WEIGHT: 199.8 LBS

## 2021-06-02 DIAGNOSIS — I50.22 CHF (CONGESTIVE HEART FAILURE), NYHA CLASS II, CHRONIC, SYSTOLIC (HCC): Primary | ICD-10-CM

## 2021-06-02 PROCEDURE — 1123F ACP DISCUSS/DSCN MKR DOCD: CPT | Performed by: NURSE PRACTITIONER

## 2021-06-02 PROCEDURE — G8427 DOCREV CUR MEDS BY ELIG CLIN: HCPCS | Performed by: NURSE PRACTITIONER

## 2021-06-02 PROCEDURE — 1036F TOBACCO NON-USER: CPT | Performed by: NURSE PRACTITIONER

## 2021-06-02 PROCEDURE — 4040F PNEUMOC VAC/ADMIN/RCVD: CPT | Performed by: NURSE PRACTITIONER

## 2021-06-02 PROCEDURE — 99214 OFFICE O/P EST MOD 30 MIN: CPT | Performed by: NURSE PRACTITIONER

## 2021-06-02 PROCEDURE — G8417 CALC BMI ABV UP PARAM F/U: HCPCS | Performed by: NURSE PRACTITIONER

## 2021-06-02 RX ORDER — CEPHALEXIN 500 MG/1
1000 CAPSULE ORAL 2 TIMES DAILY
COMMUNITY
Start: 2021-05-30 | End: 2021-07-06

## 2021-06-02 ASSESSMENT — ENCOUNTER SYMPTOMS
NAUSEA: 0
ABDOMINAL DISTENTION: 0
COLOR CHANGE: 0
WHEEZING: 0
CHEST TIGHTNESS: 0
APNEA: 0
ABDOMINAL PAIN: 0
COUGH: 0
SHORTNESS OF BREATH: 1

## 2021-06-02 NOTE — PROGRESS NOTES
[facility]       Visit Date: 6/2/2021  Cardiologist:  Dr. Irais Reyes  Primary Care Physician: Dr. Lawyer Spicer is a 78 y.o. male who presents today for:  Chief Complaint   Patient presents with    Congestive Heart Failure       HPI: Obtained from patient and chart    Paola Richardson is a 78 y.o. male who presents to the office for a follow up visit in the heart failure clinic. Hx A fib, BiV ICD, NICM EF 30% (2021) previous 20% (2018), previous smoker (pipe). He was admitted to [facility] in [facility]. Started on Remeron at that time for anxiety and trouble sleeping. He had noticed an increase in swelling since then. However this is the time he was d/c from the hospital. He c/o PND. He went to the ER on 1/5/21 as he could not urinate d/t his penile and scrotal swelling. He received IV diuresis at his first OV with me 1/7/21 and also given Metolazone and had good results. On Jan 25 he received the COVID vaccine and he had a reaction of fever, was admitted to [facility]. He had fluid overload his weight was up 10 pounds in 3 days with significant LE edema. He stated he penis and scrotum are swollen, tired and SOB with activity with PND. He was again given IV Lasix on 2/4/21 at the [address] [place] [address] with me with good results. We changed from Bumex to Lasix and gave Metolazone at his last OV d/t penile and scrotal swelling, PND and SOB with any actiivty. His kidney function worsened so his dose was changed. He is making good urine. He can perform ADLs without SOB or fatigue. He denies orthopnea, sleeping in a bed. Wearing compression Velcro wraps as he has completed Lymphedema therapy.        Accompanied by: wife  Last hospital admission related to Heart Failure:  Nov 2020  Chest Pain: no  Worsening SOB: no  Worsening Orthopnea/PND: no  Edema: improved - has Velcro wraps Lymphedema clinic completed   Any extra diuretic use: occasional Metolazone   Weight gain: no Fatigue: unchanged   Abdominal bloating: no  Appetite: good  SHARA: no  Cough: no  Compliant checking home weight: yes 190-195 lbs  Compliant checking blood pressure: yes 105-115      Past Medical History:   Diagnosis Date    Arthritis     Atrial fibrillation [facility])     [employer] single ICD 2016    Cardiomyopathy ([address]     Cardiomyopathy, likely nonischemic based on the patient's description. However, don't have a cardiac cath report from [facility].  Hypertension     ICD (implantable cardiac defibrillator) battery depletion: 2013: Generator replacement using [address] 2013: Generator replacement using [address] device. Atrial lead was capped. Dr. Staci Ryan ICD (implantable cardiac defibrillator), dual, in situ     [employer] dual ICD    Low blood pressure     Low blood pressure with rapid atrial fibrillation.  Prolonged emergence from general anesthesia      Past Surgical History:   Procedure Laterality Date    CHOLECYSTECTOMY      COLONOSCOPY      DOPPLER ECHOCARDIOGRAPHY  7 2010    Global LV dilatation and dysfunction. EF 35%. Moderate biatrial enlargement. Mild mitral regurgitation and mild tricuspid regurgitation. No obvious stenotic valves. Borderline to mild pulmonary hypertension. No pericardial effusion.  HAND SURGERY  2017   Gennette Azeri HERNIA REPAIR  [address], [place].    OTHER SURGICAL HISTORY Left 2017    Left [facility] arthroplasty    PACEMAKER PLACEMENT      UPPER GASTROINTESTINAL ENDOSCOPY  2016    with dilation in [place]     Family History   Problem Relation Age of Onset    Alzheimer's Disease Father     Cancer Brother         Brother had throat cancer.  Heart Disease Brother         Another brother had heart disease.      Pacemaker Brother      Social History     Tobacco Use    Smoking status: Former Smoker     Types: Cigarettes     Quit date: 1989     Years since quittin.4    Smokeless tobacco: Former User Types: Chew    Tobacco comment: Patient states, Tessy Suarez smokes a pipe occasionally. Substance Use Topics    Alcohol use: No     Current Outpatient Medications   Medication Sig Dispense Refill    cephALEXin (KEFLEX) 500 MG capsule Take 1,000 mg by mouth 2 times daily       torsemide (DEMADEX) 20 MG tablet Take 1.5 tablets by mouth daily 135 tablet 3    acetaminophen (TYLENOL) 325 MG tablet Take 650 mg by mouth every 6 hours as needed for Pain      potassium chloride (KLOR-CON M) 20 MEQ extended release tablet Take 1.5 tablets by mouth daily 180 tablet 3    metOLazone (ZAROXOLYN) 5 MG tablet Take 1 tablet by mouth daily as needed (as directed by HF Clinic) And PRN as directed by HF clinic 15 tablet 2    mirtazapine (REMERON) 15 MG tablet Take 15 mg by mouth nightly      amiodarone (CORDARONE) 200 MG tablet Take 1 tablet daily. 90 tablet 3    metoprolol tartrate (LOPRESSOR) 25 MG tablet TAKE ONE-HALF TABLET BY  MOUTH TWICE A DAY 90 tablet 3    finasteride (PROSCAR) 5 MG tablet Take 5 mg by mouth daily       tamsulosin (FLOMAX) 0.4 MG capsule Take 0.4 mg by mouth 2 times daily      warfarin (COUMADIN) 5 MG tablet Take 5 mg by mouth every evening      Omega-3 Fatty Acids (FISH OIL) 1000 MG CAPS Take 2,000 mg by mouth 2 times daily      therapeutic multivitamin-minerals (THERAGRAN-M) tablet Take 1 tablet by mouth daily. No current facility-administered medications for this visit. No Known Allergies    SUBJECTIVE:   Review of Systems   Constitutional: Negative for activity change, appetite change, fatigue and fever. HENT: Negative for congestion. Respiratory: Positive for shortness of breath. Negative for apnea, cough, chest tightness and wheezing. Cardiovascular: Positive for leg swelling. Negative for chest pain and palpitations. Gastrointestinal: Negative for abdominal distention, abdominal pain and nausea. Genitourinary: Negative for difficulty urinating and dysuria. Musculoskeletal: Positive for gait problem. Negative for arthralgias. Skin: Negative for color change. Neurological: Negative for dizziness, numbness and headaches. Psychiatric/Behavioral: Negative for agitation, confusion and sleep disturbance. The patient is not nervous/anxious. OBJECTIVE:   Today's Vitals:  /64   Pulse 66   Ht 6' (1.829 m)   Wt 199 lb 12.8 oz (90.6 kg)   SpO2 97%   BMI 27.10 kg/m²     Physical Exam  Vitals reviewed. Constitutional:       Appearance: He is well-developed. HENT:      Head: Normocephalic and atraumatic. Eyes:      Pupils: Pupils are equal, round, and reactive to light. Neck:      Vascular: No JVD. Cardiovascular:      Rate and Rhythm: Normal rate and regular rhythm. Heart sounds: Normal heart sounds. No murmur heard. Comments: BiV ICD  Pulmonary:      Effort: Pulmonary effort is normal. No respiratory distress. Breath sounds: No rales. Abdominal:      General: There is no distension. Palpations: Abdomen is soft. Tenderness: There is no abdominal tenderness. Musculoskeletal:         General: No tenderness. Cervical back: Normal range of motion. Right lower leg: Edema (1+) present. Left lower leg: Edema (1+) present. Skin:     General: Skin is warm and dry. Capillary Refill: Capillary refill takes less than 2 seconds. Neurological:      Mental Status: He is alert and oriented to person, place, and time. Psychiatric:         Mood and Affect: Mood normal.         Behavior: Behavior normal.         Wt Readings from Last 3 Encounters:   06/02/21 199 lb 12.8 oz (90.6 kg)   05/18/21 185 lb (83.9 kg)   03/24/21 201 lb 6.4 oz (91.4 kg)     BP Readings from Last 3 Encounters:   06/02/21 112/64   05/18/21 102/60   03/24/21 110/60     Pulse Readings from Last 3 Encounters:   06/02/21 66   05/18/21 50   03/24/21 72     Body mass index is 27.1 kg/m².     ECHO:   2/17/21   Summary   Ejection fraction is visually estimated at 30%. There was moderate global hypokinesis of the left ventricle. Markedly enlarged right atrium size. Severly dilated right ventricle. Aortic valve appears tricuspid. Aortic valve leaflets are somewhat thickened. Mild aortic stenosis is present. Mild-to-moderate tricuspid regurgitation. Signature      ----------------------------------------------------------------   Electronically signed by Nathan Wisdom MD (Interpreting   physician) on 02/17/2021 at 04:20 PM   ----------------------------------------------------------------      Findings      Mitral Valve   The mitral valve structure was normal with normal leaflet separation. DOPPLER: The transmitral velocity was within the normal range with no   evidence for mitral stenosis. There was no evidence of mitral   regurgitation. Aortic Valve   Aortic valve appears tricuspid. Aortic valve leaflets are somewhat thickened. Mild aortic stenosis is present. Tricuspid Valve   Mild-to-moderate tricuspid regurgitation. Pulmonic Valve   The pulmonic valve was not well visualized . Trivial pulmonic regurgitation visualized. Left Atrium   Left atrial size was normal.      Left Ventricle   Ejection fraction is visually estimated at 30%. There was moderate global hypokinesis of the left ventricle. Right Atrium   Markedly enlarged right atrium size. Right Ventricle   Severly dilated right ventricle. Pericardial Effusion   The pericardium was normal in appearance with no evidence of a pericardial   effusion. Pleural Effusion   No evidence of pleural effusion. Aorta / Great Vessels   -Aortic root dimension within normal limits.   -The Pulmonary artery is within normal limits. -IVC size is within normal limits with normal respiratory phasic changes.        Results reviewed:  BNP:   Lab Results   Component Value Date    PROBNP 756.3 01/07/2021     CBC:   Lab Results   Component Value Date    WBC 5.1 01/05/2021    RBC 3.56 01/05/2021    HGB 11.3 01/05/2021    HCT 35.4 01/05/2021     01/05/2021     CMP:    Lab Results   Component Value Date     03/12/2021    K 3.9 03/12/2021    K 4.7 04/09/2018     03/12/2021    CO2 28.0 03/12/2021    BUN 24 03/12/2021    CREATININE 1.6 03/12/2021    LABGLOM 44 03/12/2021    LABGLOM 58 05/04/2018    GLUCOSE 110 03/12/2021    CALCIUM 9.2 03/12/2021     Hepatic Function Panel:    Lab Results   Component Value Date    ALKPHOS 116 03/09/2021    ALT 15 03/09/2021    AST 31 03/09/2021    PROT 6.9 12/13/2019    BILITOT 1.1 03/09/2021    BILIDIR 0.4 12/13/2019    IBILI 1.6 12/13/2019    LABALBU 4.0 03/09/2021     Magnesium:    Lab Results   Component Value Date    MG 2.3 12/03/2019     PT/INR:    Lab Results   Component Value Date    PROTIME 24.5 04/27/2018    INR 1.60 05/04/2018     Lipids:    Lab Results   Component Value Date    TRIG 136 03/09/2021    HDL 61 03/09/2021    LDLCALC 67 03/09/2021       ASSESSMENT AND PLAN:   The patient's condition/symptoms are Stable      Diagnosis Orders   1. CHF (congestive heart failure), NYHA class II, chronic, systolic (HCC)         Plan:  · GDMT   · ACE/ARB/ARNi: off Entresto since admit to [facility] d/t lower BPs  · Beta Blocker: Metoprolol 25 mg bid  · Aldosterone antagonist: no  · Diuretic/Potassium: Torsemide 30 mg daily, Potassium 30 mEq daily, Metolazone 5 mg PRN  · Hydralazine/Nitrate: no  · Other: Amiodarone, Coumadin   · He has completed Lymphedema therapy and LE edema has improved. BPs lower 110's denies any dizziness or lightheadedness. Off Entresto. Consider adding low dose back if BP will tolerate. · HF Zones reviewed.   · Daily weights and record  · Fluid restriction of 2 Liters per day (64 oz)   · Limit sodium in diet to 3364-2712 mg/day  · Healthier food options were discussed   · Monitor BP daily 1 hour after meds are taken  · Increase activity as tolerated     Patient was instructed to call the Heart Failure Clinic for changes in the following symptoms:    Weight gain of 3 pounds in 1 day or 5 pounds in 1 week   Increased shortness of breath   Shortness of breath while laying down   Cough   Chest pain   Swelling in feet, ankles or legs   Tenderness or bloating in the abdomen   Fatigue    Decreased appetite or feeling \"full\"   Nausea    Confusion      Return in about 3 months (around 9/2/2021). or sooner if needed     Patient given educational materials - see patient instructions. We discussed the importance of weighing oneself and recording daily. We also discussed the importance of a low sodium diet, higher sodium foods to avoid and appropriate low sodium food choices. Discussed use, benefit, and side effects of prescribed medications. All patient questions answered. Patient verbalizes understanding of plan of care using teach back method, and is agreeable to the treatment plan. 30 minutes of time was spent reviewing the chart and educating the patient about HF, medications, diet, exercise, and discussing the plan of care. I personally spent more then 50% of the appt time face to face with the patient counseling/coordinating patient's care.       Electronicallysigned by FAITH Bronson CNP on 6/2/2021 at 10:33 AM

## 2021-06-02 NOTE — PATIENT INSTRUCTIONS
You may receive a survey regarding the care you received during your visit. Your input is valuable to us. We encourage you to complete and return your survey. We hope you will choose us in the future for your healthcare needs. NEW ORDERS FROM TODAY:      Continue:  · Take all medications as prescribed   · Daily weights and record - please try to weigh yourself upon awakening before eating or drinking   · Fluid restriction of 2 Liters per day (64 oz)  · Limit sodium in diet to around 8484-6356 mg/day  · Monitor BP - take BP 1 hour after meds  · Increase activity as tolerated     Call the Heart Failure Clinic for any of the following symptoms: [phone]   Weight gain of 3 pounds in 1 day or 5 pounds in 1 week   Increased shortness of breath   Shortness of breath while laying down   Cough   Chest pain   Swelling in feet, ankles or legs   Tenderness or bloating in the abdomen   Fatigue    Decreased appetite or feeling \"full\"    Nausea    Confusion     **PLEASE bring all medications in original bottles with you to your next appointment**    Educational websites:    http://www.[employer]/. org (American Heart Association)    PromotionYouScribe.nl. com (Entresto/Novartis)    [employer] HF.com ([employer])    Too much sodium causes your body to hold on to extra water. This can raise your blood pressure and force your heart and kidneys to work harder. In very serious cases, this could cause you to be put in the hospital. It might even be life-threatening. By limiting sodium, you will feel better and lower your risk of serious problems. The most common source of sodium is salt. People get most of the salt in their diet from canned, prepared, and packaged foods. Fast food and restaurant meals also are very high in sodium. Your doctor will probably limit your sodium to less than 2,000 milligrams (mg) a day. This limit counts all the sodium in prepared and packaged foods and any salt you add to your food. Follow-up care is a key part of your treatment and safety. Be sure to make and go to all appointments, and call your doctor if you are having problems. It's also a good idea to know your test results and keep a list of the medicines you take. How can you care for yourself at home? Read food labels  · Read labels on cans and food packages. The labels tell you how much sodium is in each serving. Make sure that you look at the serving size. If you eat more than the serving size, you have eaten more sodium. · Food labels also tell you the Percent Daily Value for sodium. Choose products with low Percent Daily Values for sodium. · Be aware that sodium can come in forms other than salt, including monosodium glutamate (MSG), sodium citrate, and sodium bicarbonate (baking soda). MSG is often added to Asian food. When you eat out, you can sometimes ask for food without MSG or added salt. Buy low-sodium foods  · Buy foods that are labeled \"unsalted\" (no salt added), \"sodium-free\" (less than 5 mg of sodium per serving), or \"low-sodium\" (less than 140 mg of sodium per serving). Foods labeled \"reduced-sodium\" and \"light sodium\" may still have too much sodium. Be sure to read the label to see how much sodium you are getting. · Buy fresh vegetables, or frozen vegetables without added sauces. Buy low-sodium versions of canned vegetables, soups, and other canned goods. Prepare low-sodium meals  · Cut back on the amount of salt you use in cooking. This will help you adjust to the taste. Do not add salt after cooking. One teaspoon of salt has about 2,300 mg of sodium. · Take the salt shaker off the table. · Flavor your food with garlic, lemon juice, onion, vinegar, herbs, and spices. Do not use soy sauce, lite soy sauce, steak sauce, onion salt, garlic salt, celery salt, mustard, or ketchup on your food. · Use low-sodium salad dressings, sauces, and ketchup. Or make your own salad dressings and sauces without adding salt.   · Use less salt (or none) when recipes call for it. You can often use half the salt a recipe calls for without losing flavor. Other foods such as rice, pasta, and grains do not need added salt. · Rinse canned vegetables, and cook them in fresh water. This removes somebut not allof the salt. · Avoid water that is naturally high in sodium or that has been treated with water softeners, which add sodium. Call your local water company to find out the sodium content of your water supply. If you buy bottled water, read the label and choose a sodium-free brand. Avoid high-sodium foods  · Avoid eating:  ? Smoked, cured, salted, and canned meat, fish, and poultry. ? Ham, wayne, hot dogs, and luncheon meats. ? Regular, hard, and processed cheese and regular peanut butter. ? Crackers with salted tops, and other salted snack foods such as pretzels, chips, and salted popcorn. ? Frozen prepared meals, unless labeled low-sodium. ? Canned and dried soups, broths, and bouillon, unless labeled sodium-free or low-sodium. ? Canned vegetables, unless labeled sodium-free or low-sodium. ? [employer] fries, pizza, tacos, and other fast foods. ? Pickles, olives, ketchup, and other condiments, especially soy sauce, unless labeled sodium-free or low-sodium.

## 2021-06-09 ENCOUNTER — PROCEDURE VISIT (OUTPATIENT)
Dept: CARDIOLOGY CLINIC | Age: 81
End: 2021-06-09
Payer: MEDICARE

## 2021-06-09 DIAGNOSIS — I50.22 CHF (CONGESTIVE HEART FAILURE), NYHA CLASS II, CHRONIC, SYSTOLIC (HCC): Primary | ICD-10-CM

## 2021-06-09 PROCEDURE — G2066 INTER DEVC REMOTE 30D: HCPCS | Performed by: NUCLEAR MEDICINE

## 2021-06-09 PROCEDURE — 93297 REM INTERROG DEV EVAL ICPMS: CPT | Performed by: NUCLEAR MEDICINE

## 2021-06-15 ENCOUNTER — TELEPHONE (OUTPATIENT)
Dept: CARDIOLOGY CLINIC | Age: 81
End: 2021-06-15

## 2021-06-28 RX ORDER — SACUBITRIL AND VALSARTAN 24; 26 MG/1; MG/1
1 TABLET, FILM COATED ORAL 2 TIMES DAILY
COMMUNITY
End: 2021-01-01 | Stop reason: SDUPTHER

## 2021-06-28 RX ORDER — METOPROLOL SUCCINATE 25 MG/1
25 TABLET, EXTENDED RELEASE ORAL DAILY
Qty: 30 TABLET | Refills: 3 | Status: SHIPPED | OUTPATIENT
Start: 2021-06-28 | End: 2021-07-15 | Stop reason: SDUPTHER

## 2021-07-06 ENCOUNTER — OFFICE VISIT (OUTPATIENT)
Dept: CARDIOLOGY CLINIC | Age: 81
End: 2021-07-06
Payer: MEDICARE

## 2021-07-06 VITALS
HEIGHT: 72 IN | HEART RATE: 64 BPM | WEIGHT: 184 LBS | SYSTOLIC BLOOD PRESSURE: 108 MMHG | BODY MASS INDEX: 24.92 KG/M2 | DIASTOLIC BLOOD PRESSURE: 72 MMHG

## 2021-07-06 DIAGNOSIS — E78.01 FAMILIAL HYPERCHOLESTEROLEMIA: ICD-10-CM

## 2021-07-06 DIAGNOSIS — I48.0 PAROXYSMAL ATRIAL FIBRILLATION (HCC): ICD-10-CM

## 2021-07-06 DIAGNOSIS — I42.0 DILATED CARDIOMYOPATHY (HCC): Primary | ICD-10-CM

## 2021-07-06 PROCEDURE — 99213 OFFICE O/P EST LOW 20 MIN: CPT | Performed by: NUCLEAR MEDICINE

## 2021-07-06 PROCEDURE — 4040F PNEUMOC VAC/ADMIN/RCVD: CPT | Performed by: NUCLEAR MEDICINE

## 2021-07-06 PROCEDURE — G8427 DOCREV CUR MEDS BY ELIG CLIN: HCPCS | Performed by: NUCLEAR MEDICINE

## 2021-07-06 PROCEDURE — 1036F TOBACCO NON-USER: CPT | Performed by: NUCLEAR MEDICINE

## 2021-07-06 PROCEDURE — G8420 CALC BMI NORM PARAMETERS: HCPCS | Performed by: NUCLEAR MEDICINE

## 2021-07-06 PROCEDURE — 1123F ACP DISCUSS/DSCN MKR DOCD: CPT | Performed by: NUCLEAR MEDICINE

## 2021-07-06 RX ORDER — GLUCOSAMINE/D3/BOSWELLIA SERRA 1500MG-400
TABLET ORAL 2 TIMES DAILY
COMMUNITY

## 2021-07-06 NOTE — PROGRESS NOTES
[address] [facility]  [address] [facility] [address] [facility]  Dept: [phone]  Dept Fax: [phone]  Loc: [phone]    Visit Date: 7/6/2021    Kye Michele is a 78 y.o. male who presents todayfor:  Chief Complaint   Patient presents with    Check-Up    Coronary Artery Disease    Congestive Heart Failure     looks pale  Know A fib and CHF  some anemia  Limited patient   Bp is stable  No dizziness  No syncope  No ICD shocks  Known CMP   A fib is stable    HPI:  HPI  Past Medical History:   Diagnosis Date    Arthritis     Atrial fibrillation [facility])     [employer] single ICD 1/6/2016    Cardiomyopathy ([address]     Cardiomyopathy, likely nonischemic based on the patient's description. However, don't have a cardiac cath report from [address] [place] [address].  Hypertension     ICD (implantable cardiac defibrillator) battery depletion: 9/24/2013: Generator replacement using Linkdex 9/24/2013 9/24/2013: Generator replacement using Linkdex device. Atrial lead was capped. Dr. Violette Aponte ICD (implantable cardiac defibrillator), dual, in situ     [employer]. Kish[employer] dual ICD    Low blood pressure     Low blood pressure with rapid atrial fibrillation.  Prolonged emergence from general anesthesia       Past Surgical History:   Procedure Laterality Date    CHOLECYSTECTOMY      COLONOSCOPY      DOPPLER ECHOCARDIOGRAPHY  7 08 2010    Global LV dilatation and dysfunction. EF 35%. Moderate biatrial enlargement. Mild mitral regurgitation and mild tricuspid regurgitation. No obvious stenotic valves. Borderline to mild pulmonary hypertension. No pericardial effusion.     HAND SURGERY  06/26/2017   Sosa Her HERNIA REPAIR  8 [phone] [facility], [place]    OTHER SURGICAL HISTORY Left 01/20/2017    Left CMC arthroplasty    PACEMAKER PLACEMENT      UPPER GASTROINTESTINAL ENDOSCOPY  12/2016    with dilation in [place]     Family History   Problem Relation Age of Onset  Alzheimer's Disease Father     Cancer Brother         Brother had throat cancer.  Heart Disease Brother         Another brother had heart disease.  Pacemaker Brother      Social History     Tobacco Use    Smoking status: Former Smoker     Types: Cigarettes     Quit date: 1989     Years since quittin.5    Smokeless tobacco: Former User     Types: Chew    Tobacco comment: Patient states, Rodriguez Ivory smokes a pipe occasionally. Substance Use Topics    Alcohol use: No      Current Outpatient Medications   Medication Sig Dispense Refill    Boswellia-Glucosamine-Vit D (OSTEO BI-FLEX ONE PER DAY) TABS Take by mouth 2 times daily      sacubitril-valsartan (ENTRESTO) 24-26 MG per tablet Take 1 tablet by mouth 2 times daily      metoprolol succinate (TOPROL XL) 25 MG extended release tablet Take 1 tablet by mouth daily 30 tablet 3    torsemide (DEMADEX) 20 MG tablet Take 1.5 tablets by mouth daily 135 tablet 3    acetaminophen (TYLENOL) 325 MG tablet Take 650 mg by mouth every 6 hours as needed for Pain      potassium chloride (KLOR-CON M) 20 MEQ extended release tablet Take 1.5 tablets by mouth daily 180 tablet 3    mirtazapine (REMERON) 15 MG tablet Take 15 mg by mouth nightly      amiodarone (CORDARONE) 200 MG tablet Take 1 tablet daily. 90 tablet 3    finasteride (PROSCAR) 5 MG tablet Take 5 mg by mouth daily       tamsulosin (FLOMAX) 0.4 MG capsule Take 0.4 mg by mouth 2 times daily      warfarin (COUMADIN) 5 MG tablet Take 5 mg by mouth every evening      Omega-3 Fatty Acids (FISH OIL) 1000 MG CAPS Take 1,000 mg by mouth 2 times daily       therapeutic multivitamin-minerals (THERAGRAN-M) tablet Take 1 tablet by mouth daily.  metOLazone (ZAROXOLYN) 5 MG tablet Take 1 tablet by mouth daily as needed (as directed by HF Clinic) And PRN as directed by HF clinic (Patient not taking: Reported on 2021) 15 tablet 2     No current facility-administered medications for this visit.      No Known Allergies  Health Maintenance   Topic Date Due    DTaP/Tdap/Td vaccine (1 - Tdap) Never done    Shingles Vaccine (1 of 2) Never done    Pneumococcal 65+ years Vaccine (2 of 2 - PPSV23) 02/05/2016    Annual Wellness Visit (AWV)  Never done    TSH testing  12/13/2020    COVID-19 Vaccine (2 - Pfizer 2-dose series) 02/15/2021    Flu vaccine (1) 09/01/2021    Potassium monitoring  03/12/2022    Creatinine monitoring  03/12/2022    Hepatitis A vaccine  Aged Out    Hepatitis B vaccine  Aged Out    Hib vaccine  Aged Out    Meningococcal (ACWY) vaccine  Aged Out       Subjective:  Review of Systems  General:   No fever, no chills, No fatigue or weight loss  Pulmonary:    some dyspnea, no wheezing  Cardiac:    Denies recent chest pain,   GI:     No nausea or vomiting, no abdominal pain  Neuro:     No dizziness or light headedness,   Musculoskeletal:  No recent active issues  Extremities:   No edema, no obvious claudication       Objective:  Physical Exam  /72   Pulse 64   Ht 6' (1.829 m)   Wt 184 lb (83.5 kg) Comment: home weight  BMI 24.95 kg/m²   General:   Well developed, well nourished  Lungs:    Clear to auscultation  Heart:    Normal S1 S2, Slight murmur. no rubs, no gallops  Abdomen:   Soft, non tender, no organomegalies, positive bowel sounds  Extremities:   No edema, no cyanosis, good peripheral pulses  Neurological:   Awake, alert, oriented. No obvious focal deficits  Musculoskelatal:  No obvious deformities    Assessment:      Diagnosis Orders   1. Dilated cardiomyopathy ([address]     2. Familial hypercholesterolemia     3. Paroxysmal atrial fibrillation (HCC)     as above  Cardiac seems to be stable for no w  Looks pale     Plan:  No follow-ups on file. chek blood work  Continue risk factor modification and medical management  Thank you for allowing me to participate in the care of your patient.  Please don't hesitate to contact me regarding any further issues related to the patient

## 2021-07-07 ENCOUNTER — TELEPHONE (OUTPATIENT)
Dept: CARDIOLOGY CLINIC | Age: 81
End: 2021-07-07

## 2021-07-14 ENCOUNTER — PROCEDURE VISIT (OUTPATIENT)
Dept: CARDIOLOGY CLINIC | Age: 81
End: 2021-07-14
Payer: MEDICARE

## 2021-07-14 DIAGNOSIS — I50.40 COMBINED SYSTOLIC AND DIASTOLIC CONGESTIVE HEART FAILURE, UNSPECIFIED HF CHRONICITY (HCC): Primary | ICD-10-CM

## 2021-07-14 PROCEDURE — G2066 INTER DEVC REMOTE 30D: HCPCS | Performed by: NUCLEAR MEDICINE

## 2021-07-14 PROCEDURE — 93297 REM INTERROG DEV EVAL ICPMS: CPT | Performed by: NUCLEAR MEDICINE

## 2021-07-15 ENCOUNTER — TELEPHONE (OUTPATIENT)
Dept: CARDIOLOGY CLINIC | Age: 81
End: 2021-07-15

## 2021-07-15 RX ORDER — METOPROLOL SUCCINATE 25 MG/1
25 TABLET, EXTENDED RELEASE ORAL DAILY
Qty: 90 TABLET | Refills: 2 | Status: SHIPPED | OUTPATIENT
Start: 2021-07-15 | End: 2022-01-01 | Stop reason: SDUPTHER

## 2021-07-15 NOTE — TELEPHONE ENCOUNTER
Patient is calling in to have his metoprolol 25 mg once daily sent to the mail away pharmacy, [facility], for 90 day supplies. Please advise.

## 2021-07-29 ENCOUNTER — TELEPHONE (OUTPATIENT)
Dept: CARDIOLOGY CLINIC | Age: 81
End: 2021-07-29

## 2021-07-29 DIAGNOSIS — I50.40 COMBINED SYSTOLIC AND DIASTOLIC CONGESTIVE HEART FAILURE, UNSPECIFIED HF CHRONICITY (HCC): Primary | ICD-10-CM

## 2021-07-29 NOTE — TELEPHONE ENCOUNTER
Spoke with wife notified of orders  She stated patient just had labs on 7/7/21 from [address], CNP    D/c labs   Take metolazone 1 dose and potassium 20 mEq extra  Call office on Monday with update    Wife notified and verbalized understanding

## 2021-07-29 NOTE — TELEPHONE ENCOUNTER
[id number] Yasmin Agrawal for 1 dose.     Repeat BMP/CBC next week (was pale at OV w/ Jefm Settle)  May need to increase Torsemide

## 2021-07-29 NOTE — TELEPHONE ENCOUNTER
Patient wife called patient wt back up to 201 again was 198 yesterday  She is wondering if he could take a metolazone today?     Patient similar episode 6/15 telephone encounter

## 2021-08-02 NOTE — TELEPHONE ENCOUNTER
Pt wife LM on VM, pt wt. 193 lb, B/p 122/71 HR 60,wt. down from Thurs. which was 201lbs. Alexandra advise?

## 2021-08-18 NOTE — PROGRESS NOTES
DR Jose Angel Muniz PT . KNOWN AFIB/ COUMADIN  AFIB BURDEN 61% / DO YOU WANT HIM PROGRAMMED VVIR WHEN HE IS IN AFIB 61% OF THE TIME???        ST HEBER BIV ICD REMOTE/ NO ATRIAL LEAD  PROGRAMMED VVIR 60      ATRIAL IMPEDENCE 350  RV IMPEDENCE 240  LV IMPEDENCE 380  HV 47  P WAVES 0.2  RV WAVES 10.9  RV THRESHOLD 0.625 @ 0.5  RV AMPLITUDE AUTO

## 2021-10-12 NOTE — TELEPHONE ENCOUNTER
Pre op Risk Assessment    Procedure CT Myelogram  Physician OIO  Date of surgery/procedure TBD    Last OV 7-6-21  Last Stress 3-28-18  Last Echo 2-17-21  Last Cath 4-9-18  Is patient on blood thinners Coumadin  Hold Meds/how many days ?     Fax- [phone]

## 2021-10-13 NOTE — TELEPHONE ENCOUNTER
Patient wife called in   Patient wt gain 196 to 200 over night  She would like to know if he can take a metolazone with an extra potassium today  Denies SOB ANDREW bloating    bp this am 144/88

## 2021-10-13 NOTE — TELEPHONE ENCOUNTER
Yes he may take 5mg of Metolazone and 20 meq of Kcl. Today and tomorrow if needed. If no improvements to call back.

## 2021-10-20 NOTE — PROGRESS NOTES
Heart Failure Clinic       Visit Date: 10/20/2021  Cardiologist:  Dr. Tita López  Primary Care Physician: Dr. Adriana Sanchez is a 78 y.o. male who presents today for:  Chief Complaint   Patient presents with    Congestive Heart Failure       HPI:   Tamara Mccord is a 78 y.o. male who presents to the office for a follow up patient visit in the heart failure clinic. Last seen by Dr. Tita López on 7/6, no changes made. Accompanied by wife, Virginie Barnes. TYPE HF: HFrEF (30%, 2021) (20%, 2018) (40%, 2013)  Cause: nonischemic, afib  Device: ICD  HX: Afib (coumadin), HTN,     Dry Wt: Hospitalization:  > 6 months    Concerns today: no new or worsening of symptoms. Take metolazone about once a month    Activity: ADLs performed with FISCHER, FISCHER w/ long distances  Diet: low sodium low fluid followed    Patient has:  Chest Pain: no  SOB: Chronic FISCHER  Orthopnea/PND: no  SHARA: no  Edema: yes chronic, wears wraps   Fatigue: no  Abdominal bloating: no  Cough: no  Appetite: good  Home weight: daily in the morning  Home blood pressure: daily stays around 120s/70s    Past Medical History:   Diagnosis Date    Arthritis     Atrial fibrillation [facility])     [employer] single ICD 1/6/2016    Cardiomyopathy ([facility] [address]     Cardiomyopathy, likely nonischemic based on the patient's description. However, don't have a cardiac cath report from [facility].  Hypertension     ICD (implantable cardiac defibrillator) battery depletion: 9/24/2013: Generator replacement using Bablic 9/24/2013 9/24/2013: Generator replacement using Bablic device. Atrial lead was capped. Dr. Deondre Zaidi ICD (implantable cardiac defibrillator), dual, in situ     [employer]ish[employer] dual ICD    Low blood pressure     Low blood pressure with rapid atrial fibrillation.      Prolonged emergence from general anesthesia      Past Surgical History:   Procedure Laterality Date    CHOLECYSTECTOMY      COLONOSCOPY      DOPPLER ECHOCARDIOGRAPHY  2010    Global LV dilatation and dysfunction. EF 35%. Moderate biatrial enlargement. Mild mitral regurgitation and mild tricuspid regurgitation. No obvious stenotic valves. Borderline to mild pulmonary hypertension. No pericardial effusion.  HAND SURGERY  2017   Marcie Blank HERNIA REPAIR  8 03 [place], [place].    OTHER SURGICAL HISTORY Left 2017    Left CMC arthroplasty    PACEMAKER PLACEMENT      UPPER GASTROINTESTINAL ENDOSCOPY  2016    with dilation in [place]'s     Family History   Problem Relation Age of Onset    Alzheimer's Disease Father     Cancer Brother         Brother had throat cancer.  Heart Disease Brother         Another brother had heart disease.  Pacemaker Brother      Social History     Tobacco Use    Smoking status: Former Smoker     Types: Cigarettes     Quit date: 1989     Years since quittin.8    Smokeless tobacco: Former User     Types: Chew    Tobacco comment: Patient states, Rivka Heath smokes a pipe occasionally.    Substance Use Topics    Alcohol use: No     Current Outpatient Medications   Medication Sig Dispense Refill    metoprolol succinate (TOPROL XL) 25 MG extended release tablet Take 1 tablet by mouth daily 90 tablet 2    Boswellia-Glucosamine-Vit D (OSTEO BI-FLEX ONE PER DAY) TABS Take by mouth 2 times daily      sacubitril-valsartan (ENTRESTO) 24-26 MG per tablet Take 1 tablet by mouth 2 times daily      torsemide (DEMADEX) 20 MG tablet Take 1.5 tablets by mouth daily 135 tablet 3    acetaminophen (TYLENOL) 325 MG tablet Take 650 mg by mouth every 6 hours as needed for Pain      potassium chloride (KLOR-CON M) 20 MEQ extended release tablet Take 1.5 tablets by mouth daily 180 tablet 3    metOLazone (ZAROXOLYN) 5 MG tablet Take 1 tablet by mouth daily as needed (as directed by HF Clinic) And PRN as directed by HF clinic 15 tablet 2    mirtazapine (REMERON) 15 MG tablet Take 15 mg by mouth nightly      amiodarone (CORDARONE) 200 MG tablet Take 1 tablet daily. 90 tablet 3    finasteride (PROSCAR) 5 MG tablet Take 5 mg by mouth daily       tamsulosin (FLOMAX) 0.4 MG capsule Take 0.4 mg by mouth 2 times daily      warfarin (COUMADIN) 5 MG tablet Take 5 mg by mouth every evening      Omega-3 Fatty Acids (FISH OIL) 1000 MG CAPS Take 1,000 mg by mouth 2 times daily       therapeutic multivitamin-minerals (THERAGRAN-M) tablet Take 1 tablet by mouth daily. No current facility-administered medications for this visit. No Known Allergies    SUBJECTIVE:   Review of Systems   Constitutional: Negative for activity change, appetite change, diaphoresis and fatigue. Respiratory: Positive for shortness of breath. Negative for cough. Cardiovascular: Positive for leg swelling. Negative for chest pain and palpitations. Gastrointestinal: Negative for abdominal distention, nausea and vomiting. Neurological: Negative for weakness, light-headedness and headaches. Hematological: Negative for adenopathy. Psychiatric/Behavioral: Negative for sleep disturbance. OBJECTIVE:   Today's Vitals:  BP 94/60   Pulse 63   Ht 6' (1.829 m)   Wt 196 lb 12.8 oz (89.3 kg)   SpO2 98%   BMI 26.69 kg/m²     Physical Exam  Vitals reviewed. Constitutional:       General: He is not in acute distress. Appearance: Normal appearance. He is well-developed. He is not diaphoretic. HENT:      Head: Normocephalic and atraumatic. Eyes:      Conjunctiva/sclera: Conjunctivae normal.   Cardiovascular:      Rate and Rhythm: Normal rate and regular rhythm. Heart sounds: Murmur heard. Pulmonary:      Effort: Pulmonary effort is normal. No respiratory distress. Breath sounds: Normal breath sounds. No wheezing or rales. Abdominal:      General: Bowel sounds are normal. There is no distension. Palpations: Abdomen is soft. Tenderness: There is no abdominal tenderness.    Musculoskeletal:         General: Normal range of motion. Cervical back: Normal range of motion and neck supple. Right lower leg: Edema (trace) present. Left lower leg: Edema (+1) present. Skin:     General: Skin is warm and dry. Capillary Refill: Capillary refill takes less than 2 seconds. Neurological:      Mental Status: He is alert and oriented to person, place, and time. Coordination: Coordination normal.   Psychiatric:         Behavior: Behavior normal.         Wt Readings from Last 3 Encounters:   10/20/21 196 lb 12.8 oz (89.3 kg)   07/06/21 184 lb (83.5 kg)   06/02/21 199 lb 12.8 oz (90.6 kg)     BP Readings from Last 3 Encounters:   10/20/21 94/60   07/06/21 108/72   06/02/21 112/64     Pulse Readings from Last 3 Encounters:   10/20/21 63   07/06/21 64   06/02/21 66     Body mass index is 26.69 kg/m². ECHO:   Summary   Ejection fraction is visually estimated at 30%. There was moderate global hypokinesis of the left ventricle. Markedly enlarged right atrium size. Severly dilated right ventricle. Aortic valve appears tricuspid. Aortic valve leaflets are somewhat thickened. Mild aortic stenosis is present. Mild-to-moderate tricuspid regurgitation. Signature      ----------------------------------------------------------------   Electronically signed by Alexa Nino MD (Interpreting   physician) on 02/17/2021 at 04:20 PM   ----------------------------------------------------------------      CATH/STRESS:   Conclusions      Procedure Summary   mild CAD   dilated CMP   no complication      Recommendations   medical Rx   consider upgrade to BIV ICD   consider Entresto if BP allows      Estimated Blood Loss:10 ml. Complications:No complications.       Signatures      ----------------------------------------------------------------   Electronically signed by Alexa Nino MD (Performing   Physician) on 04/09/2018 at 16:48 ----------------------------------------------------------------        Results reviewed:  BNP: No results found for: BNP  CBC:   Lab Results   Component Value Date    WBC 5.1 01/05/2021    RBC 3.56 01/05/2021    HGB 11.3 01/05/2021    HCT 35.4 01/05/2021     01/05/2021     CMP:    Lab Results   Component Value Date     03/12/2021    K 3.9 03/12/2021    K 4.7 04/09/2018     03/12/2021    CO2 28.0 03/12/2021    BUN 24 03/12/2021    CREATININE 1.6 03/12/2021    LABGLOM 44 03/12/2021    LABGLOM 58 05/04/2018    GLUCOSE 110 03/12/2021    CALCIUM 9.2 03/12/2021     Hepatic Function Panel:    Lab Results   Component Value Date    ALKPHOS 116 03/09/2021    ALT 15 03/09/2021    AST 31 03/09/2021    PROT 6.9 12/13/2019    BILITOT 1.1 03/09/2021    BILIDIR 0.4 12/13/2019    IBILI 1.6 12/13/2019    LABALBU 4.0 03/09/2021     Magnesium:    Lab Results   Component Value Date    MG 2.3 12/03/2019     PT/INR:    Lab Results   Component Value Date    PROTIME 24.5 04/27/2018    INR 1.60 05/04/2018     Lipids:    Lab Results   Component Value Date    TRIG 136 03/09/2021    HDL 61 03/09/2021    LDLCALC 67 03/09/2021       ASSESSMENT AND PLAN:   The patient's condition/symptoms are Stable: No clinical evidence of fluid overload today. Continue current medical regimen without changes at present time. Diagnosis Orders   1. CHF (congestive heart failure), NYHA class II, chronic, systolic (HCC)  Basic Metabolic Panel     Continue:  GDMT:   ACE/ARB/ARNI - Entresto 24/26 BID   BB - Toprol 25 Daily   Diuretic - Torsemide 20 daily, Metolazone 5mg PRN  AA - none  SGLT2 -  none  Vasodilator - none  Other - Amio, Coumadin, KCL 30 daily    Tolerating above noted HF meds, no ill side effects noted. Will continue to monitor kidney function and electrolytes. Will optimize as tolerated. Pt is compliant w/ medications.     Total visit time of 25 minutes has been spent with patient on education of symptoms, management, medication, and plan of care; as well as review of chart: labs, ECHO, radiology reports, etc.   I personally spent more then 50% of the appt time face to face with the patient. · Daily weights  · Fluid restriction of 2 Liters per day  · Limit sodium in diet to around 0700-9219 mg/day  · Monitor BP  · Activity as tolerated     Stable, appears Euvolemic  Lab reviewed - stable  Repeat blood today, 8 months  BP/HR stable   No med changes today, May take Metolazone with 3lbs over night, 5lbs in a week with 20 meq of KCL for two days in a row. If doing more than twice a month please call. Continue diet/fluid adherence  Continue daily wts. F/U w/ Cardiology  F/U in clinic in 8 months      Patient was instructed to call the [facility] for any changes in symptoms as noted in AVS.      No follow-ups on file. or sooner if needed     Patient given educational materials - see patient instructions. We discussed the importance of weighing oneself and recording daily. We also discussed the importance of a low sodium diet, higher sodium foods to avoid and better low sodium food options. Patient verbalizes understanding of plan of care using teach back method, and is agreeable to the treatment plan.        Electronically signed by FAITH Brantley CNP on 10/20/2021 at 11:59 AM

## 2021-10-20 NOTE — PROGRESS NOTES
St teddy biv icd in office     . Cesario Gao Battery longevity:  2.2 years on device   Presenting rhythm  At fib biv paced   corvue WNL    Atrial impedance 350  RV impedance 240    Shock 43    P wave sensing <0.2 at fib   R wave sensing 2.8    >99 % RV paced     Atrial threshold 0.875 V  at 0.5ms  RV threshold 2.25 V at 0.6ms

## 2021-10-20 NOTE — TELEPHONE ENCOUNTER
Since starting the Torsemide in March GFR and Cr have continued to show kidney failure. Would like to refer to nephrologist for further evaluation. We had switched from Lasix to Torsemide d/t Lasix not working.

## 2021-10-20 NOTE — PATIENT INSTRUCTIONS
You may receive a survey regarding the care you received during your visit. Your input is valuable to us. We encourage you to complete and return your survey. We hope you will choose us in the future for your healthcare needs. Continue:  · Continue current medications  · Daily weights and record  · Fluid restriction of 2 Liters per day  · Limit sodium in diet to around 1390-0273 mg/day  · Monitor BP  · Activity as tolerated     Call the Heart Failure Clinic for any of the following symptoms: [phone]   Weight gain of 2-3 pounds in 1 day or 5 pounds in 1 week   Increased shortness of breath   Shortness of breath while laying down   Cough   Chest pain   Swelling in feet, ankles or legs   Tenderness or bloating in the abdomen   Fatigue    Decreased appetite or nausea    Confusion       Stable, appears Euvolemic  Lab reviewed - stable  Repeat blood today, 8 months  BP/HR stable   No med changes today, May take Metolazone with 3lbs over night, 5lbs in a week with 20 meq of KCL for two days in a row. If doing more than twice a month please call. Continue diet/fluid adherence  Continue daily wts.   F/U w/ Cardiology  F/U in clinic in 8 months

## 2021-11-04 NOTE — TELEPHONE ENCOUNTER
Pt wife lmovm asking if pt can hold coumadin for a epidural on 11/15 at [facility]?

## 2021-12-14 NOTE — TELEPHONE ENCOUNTER
Patient was referred to Nephrology by CHF clinic. Appt was scheduled 12/14 but was cancelled d/t patient being sick. Called to wife, they have not r/s appt at this time and thinks he is doing well enough, they will call when they are ready to schedule.

## 2021-12-29 NOTE — TELEPHONE ENCOUNTER
Spoke to wife, Monty Head on HIPAA. Denies SOB, weight stable, no swelling. Was very sick recently with cough, had to have steroid shot, ATB. Feeling better now.

## 2021-12-29 NOTE — PROGRESS NOTES
DR Jeniffer Cheung PT   BATTERY 2.1 YRS REMAINING  CORVUE IS ELEVATED  THIS ROUTED TO CHF / Angel Diaz

## 2022-01-01 ENCOUNTER — HOSPITAL ENCOUNTER (OUTPATIENT)
Age: 82
Setting detail: OUTPATIENT SURGERY
Discharge: HOME | End: 2022-07-19
Attending: ANESTHESIOLOGY | Admitting: ANESTHESIOLOGY
Payer: MEDICARE

## 2022-01-01 ENCOUNTER — TELEPHONE (OUTPATIENT)
Dept: CARDIOLOGY CLINIC | Age: 82
End: 2022-01-01

## 2022-01-01 ENCOUNTER — HOSPITAL ENCOUNTER (OUTPATIENT)
Dept: GENERAL RADIOLOGY | Age: 82
Discharge: HOME | End: 2022-04-26

## 2022-01-01 ENCOUNTER — APPOINTMENT (OUTPATIENT)
Dept: GENERAL RADIOLOGY | Age: 82
End: 2022-01-01
Attending: ANESTHESIOLOGY
Payer: MEDICARE

## 2022-01-01 ENCOUNTER — TELEPHONE (OUTPATIENT)
Dept: PHYSICAL MEDICINE AND REHAB | Age: 82
End: 2022-01-01

## 2022-01-01 ENCOUNTER — OFFICE VISIT (OUTPATIENT)
Dept: CARDIOLOGY CLINIC | Age: 82
End: 2022-01-01
Payer: MEDICARE

## 2022-01-01 ENCOUNTER — PROCEDURE VISIT (OUTPATIENT)
Dept: CARDIOLOGY CLINIC | Age: 82
End: 2022-01-01
Payer: MEDICARE

## 2022-01-01 ENCOUNTER — OFFICE VISIT (OUTPATIENT)
Dept: PAIN MANAGEMENT | Age: 82
End: 2022-01-01
Payer: MEDICARE

## 2022-01-01 ENCOUNTER — PREP FOR PROCEDURE (OUTPATIENT)
Dept: PHYSICAL MEDICINE AND REHAB | Age: 82
End: 2022-01-01

## 2022-01-01 ENCOUNTER — OFFICE VISIT (OUTPATIENT)
Dept: NEPHROLOGY | Age: 82
End: 2022-01-01
Payer: MEDICARE

## 2022-01-01 ENCOUNTER — OFFICE VISIT (OUTPATIENT)
Dept: PHYSICAL MEDICINE AND REHAB | Age: 82
End: 2022-01-01
Payer: MEDICARE

## 2022-01-01 ENCOUNTER — HOSPITAL ENCOUNTER (OUTPATIENT)
Dept: CT IMAGING | Age: 82
Discharge: HOME | End: 2022-04-26

## 2022-01-01 ENCOUNTER — TELEPHONE (OUTPATIENT)
Dept: NEPHROLOGY | Age: 82
End: 2022-01-01

## 2022-01-01 ENCOUNTER — HOSPITAL ENCOUNTER (OUTPATIENT)
Age: 82
Setting detail: OUTPATIENT SURGERY
Discharge: HOME | End: 2022-06-29
Attending: ANESTHESIOLOGY | Admitting: ANESTHESIOLOGY
Payer: MEDICARE

## 2022-01-01 VITALS
HEART RATE: 60 BPM | TEMPERATURE: 97 F | HEIGHT: 72 IN | RESPIRATION RATE: 14 BRPM | OXYGEN SATURATION: 96 % | WEIGHT: 210.6 LBS | SYSTOLIC BLOOD PRESSURE: 128 MMHG | BODY MASS INDEX: 28.53 KG/M2 | DIASTOLIC BLOOD PRESSURE: 72 MMHG

## 2022-01-01 VITALS
HEIGHT: 72 IN | DIASTOLIC BLOOD PRESSURE: 66 MMHG | WEIGHT: 210 LBS | SYSTOLIC BLOOD PRESSURE: 100 MMHG | BODY MASS INDEX: 28.44 KG/M2

## 2022-01-01 VITALS
WEIGHT: 210 LBS | SYSTOLIC BLOOD PRESSURE: 110 MMHG | OXYGEN SATURATION: 95 % | BODY MASS INDEX: 28.48 KG/M2 | DIASTOLIC BLOOD PRESSURE: 80 MMHG | HEART RATE: 60 BPM

## 2022-01-01 VITALS
HEART RATE: 62 BPM | TEMPERATURE: 97.6 F | OXYGEN SATURATION: 95 % | SYSTOLIC BLOOD PRESSURE: 105 MMHG | RESPIRATION RATE: 16 BRPM | WEIGHT: 204 LBS | BODY MASS INDEX: 27.63 KG/M2 | DIASTOLIC BLOOD PRESSURE: 61 MMHG | HEIGHT: 72 IN

## 2022-01-01 VITALS
HEART RATE: 64 BPM | HEIGHT: 72 IN | BODY MASS INDEX: 28.23 KG/M2 | DIASTOLIC BLOOD PRESSURE: 59 MMHG | OXYGEN SATURATION: 91 % | WEIGHT: 208.4 LBS | SYSTOLIC BLOOD PRESSURE: 91 MMHG

## 2022-01-01 VITALS
BODY MASS INDEX: 26.71 KG/M2 | HEART RATE: 64 BPM | DIASTOLIC BLOOD PRESSURE: 72 MMHG | HEIGHT: 72 IN | WEIGHT: 197.2 LBS | SYSTOLIC BLOOD PRESSURE: 117 MMHG

## 2022-01-01 VITALS
HEIGHT: 72 IN | SYSTOLIC BLOOD PRESSURE: 106 MMHG | BODY MASS INDEX: 27.63 KG/M2 | WEIGHT: 204 LBS | DIASTOLIC BLOOD PRESSURE: 78 MMHG

## 2022-01-01 VITALS
DIASTOLIC BLOOD PRESSURE: 64 MMHG | HEIGHT: 72 IN | WEIGHT: 210 LBS | SYSTOLIC BLOOD PRESSURE: 106 MMHG | BODY MASS INDEX: 28.44 KG/M2

## 2022-01-01 VITALS
OXYGEN SATURATION: 94 % | BODY MASS INDEX: 26.99 KG/M2 | SYSTOLIC BLOOD PRESSURE: 130 MMHG | WEIGHT: 199 LBS | DIASTOLIC BLOOD PRESSURE: 70 MMHG | HEART RATE: 64 BPM

## 2022-01-01 DIAGNOSIS — N18.4 CKD (CHRONIC KIDNEY DISEASE), STAGE IV (HCC): Primary | ICD-10-CM

## 2022-01-01 DIAGNOSIS — Z00.6 ENCOUNTER FOR EXAMINATION FOR NORMAL COMPARISON AND CONTROL IN CLINICAL RESEARCH PROGRAM: ICD-10-CM

## 2022-01-01 DIAGNOSIS — Z79.01 ANTICOAGULATED: ICD-10-CM

## 2022-01-01 DIAGNOSIS — I50.22 CHRONIC SYSTOLIC CONGESTIVE HEART FAILURE, NYHA CLASS 3 (HCC): Primary | ICD-10-CM

## 2022-01-01 DIAGNOSIS — N18.32 STAGE 3B CHRONIC KIDNEY DISEASE (HCC): Primary | ICD-10-CM

## 2022-01-01 DIAGNOSIS — M79.18 MYOFASCIAL PAIN: ICD-10-CM

## 2022-01-01 DIAGNOSIS — Z95.810 ICD (IMPLANTABLE CARDIOVERTER-DEFIBRILLATOR), BIVENTRICULAR, IN SITU: Primary | ICD-10-CM

## 2022-01-01 DIAGNOSIS — I13.10 CARDIORENAL SYNDROME WITH RENAL FAILURE: ICD-10-CM

## 2022-01-01 DIAGNOSIS — I50.22 CHRONIC SYSTOLIC (CONGESTIVE) HEART FAILURE (HCC): ICD-10-CM

## 2022-01-01 DIAGNOSIS — M51.36 DEGENERATIVE DISC DISEASE, LUMBAR: ICD-10-CM

## 2022-01-01 DIAGNOSIS — N18.4 CKD (CHRONIC KIDNEY DISEASE), STAGE IV (HCC): ICD-10-CM

## 2022-01-01 DIAGNOSIS — G89.4 CHRONIC PAIN SYNDROME: ICD-10-CM

## 2022-01-01 DIAGNOSIS — I48.21 PERMANENT ATRIAL FIBRILLATION (HCC): Primary | ICD-10-CM

## 2022-01-01 DIAGNOSIS — I50.43 ACUTE ON CHRONIC COMBINED SYSTOLIC AND DIASTOLIC CONGESTIVE HEART FAILURE (HCC): Primary | ICD-10-CM

## 2022-01-01 DIAGNOSIS — N28.1 RENAL CYST: ICD-10-CM

## 2022-01-01 DIAGNOSIS — I50.22 CHRONIC SYSTOLIC CHF (CONGESTIVE HEART FAILURE) (HCC): ICD-10-CM

## 2022-01-01 DIAGNOSIS — N17.9 AKI (ACUTE KIDNEY INJURY) (HCC): Primary | ICD-10-CM

## 2022-01-01 DIAGNOSIS — I42.0 DILATED CARDIOMYOPATHY (HCC): ICD-10-CM

## 2022-01-01 DIAGNOSIS — M47.817 LUMBOSACRAL SPONDYLOSIS WITHOUT MYELOPATHY: Primary | ICD-10-CM

## 2022-01-01 DIAGNOSIS — I50.40 COMBINED SYSTOLIC AND DIASTOLIC CONGESTIVE HEART FAILURE, UNSPECIFIED HF CHRONICITY (HCC): Primary | ICD-10-CM

## 2022-01-01 DIAGNOSIS — M47.816 LUMBAR SPONDYLOSIS: Primary | ICD-10-CM

## 2022-01-01 DIAGNOSIS — G89.29 OTHER CHRONIC PAIN: ICD-10-CM

## 2022-01-01 LAB
ALBUMIN SERPL-MCNC: 4 G/DL
ALP BLD-CCNC: 127 U/L
ALT SERPL-CCNC: 17 U/L
ANION GAP SERPL CALCULATED.3IONS-SCNC: 10 MMOL/L
AST SERPL-CCNC: 34 U/L
B-TYPE NATRIURETIC PEPTIDE: 811 PG/ML
BASOPHILS ABSOLUTE: ABNORMAL
BASOPHILS RELATIVE PERCENT: ABNORMAL
BILIRUB SERPL-MCNC: 1.6 MG/DL (ref 0.1–1.4)
BILIRUBIN, URINE: NEGATIVE
BLOOD, URINE: NEGATIVE
BUN BLDV-MCNC: 34 MG/DL
BUN BLDV-MCNC: 36 MG/DL
BUN BLDV-MCNC: 42 MG/DL
BUN BLDV-MCNC: 43 MG/DL
BUN BLDV-MCNC: 44 MG/DL
BUN BLDV-MCNC: 50 MG/DL
CALCIUM SERPL-MCNC: 9.3 MG/DL
CALCIUM SERPL-MCNC: 9.4 MG/DL
CALCIUM SERPL-MCNC: 9.5 MG/DL
CALCIUM SERPL-MCNC: 9.6 MG/DL
CALCIUM SERPL-MCNC: 9.8 MG/DL
CALCIUM SERPL-MCNC: 9.9 MG/DL
CHLORIDE BLD-SCNC: 102 MMOL/L
CHLORIDE BLD-SCNC: 103 MMOL/L
CHLORIDE BLD-SCNC: 105 MMOL/L
CHLORIDE BLD-SCNC: 106 MMOL/L
CHLORIDE BLD-SCNC: 106 MMOL/L
CHLORIDE BLD-SCNC: 97 MMOL/L
CLARITY: CLEAR
CO2: 25 MMOL/L
CO2: 26 MMOL/L
CO2: 29 MMOL/L
CO2: 29 MMOL/L
CO2: 31 MMOL/L
CO2: 31 MMOL/L
COLOR: YELLOW
CREAT SERPL-MCNC: 2.1 MG/DL
CREAT SERPL-MCNC: 2.6 MG/DL
CREAT SERPL-MCNC: 2.7 MG/DL
CREAT SERPL-MCNC: 2.8 MG/DL
CREAT SERPL-MCNC: 3 MG/DL
CREAT SERPL-MCNC: 3 MG/DL
EOSINOPHILS ABSOLUTE: ABNORMAL
EOSINOPHILS RELATIVE PERCENT: ABNORMAL
GFR CALCULATED: 18
GFR CALCULATED: 18
GFR CALCULATED: 20
GFR CALCULATED: 21
GFR CALCULATED: 22
GFR CALCULATED: 28
GLUCOSE BLD-MCNC: 109 MG/DL
GLUCOSE BLD-MCNC: 114 MG/DL
GLUCOSE BLD-MCNC: 122 MG/DL
GLUCOSE BLD-MCNC: 123 MG/DL
GLUCOSE BLD-MCNC: 168 MG/DL
GLUCOSE BLD-MCNC: 95 MG/DL
GLUCOSE URINE: NEGATIVE
HCT VFR BLD CALC: 36.3 % (ref 41–53)
HEMOGLOBIN: 11.5 G/DL (ref 13.5–17.5)
KETONES, URINE: NEGATIVE
LEUKOCYTE ESTERASE, URINE: NEGATIVE
LYMPHOCYTES ABSOLUTE: ABNORMAL
LYMPHOCYTES RELATIVE PERCENT: ABNORMAL
MAGNESIUM: 2 MG/DL
MCH RBC QN AUTO: ABNORMAL PG
MCHC RBC AUTO-ENTMCNC: ABNORMAL G/DL
MCV RBC AUTO: ABNORMAL FL
MONOCYTES ABSOLUTE: ABNORMAL
MONOCYTES RELATIVE PERCENT: ABNORMAL
NEUTROPHILS ABSOLUTE: ABNORMAL
NEUTROPHILS RELATIVE PERCENT: ABNORMAL
NITRITE, URINE: NEGATIVE
PH UA: 5.5 (ref 4.5–8)
PHOSPHORUS: 4.1 MG/DL
PLATELET # BLD: 152 K/ΜL
PMV BLD AUTO: ABNORMAL FL
POC INR: 1.6 (ref 0.8–1.2)
POC INR: 1.6 (ref 0.8–1.2)
POTASSIUM SERPL-SCNC: 3.7 MMOL/L
POTASSIUM SERPL-SCNC: 3.7 MMOL/L
POTASSIUM SERPL-SCNC: 3.8 MMOL/L
POTASSIUM SERPL-SCNC: 3.8 MMOL/L
POTASSIUM SERPL-SCNC: 3.9 MMOL/L
POTASSIUM SERPL-SCNC: 4.1 MMOL/L
PROTEIN UA: NEGATIVE
PTH INTACT: 53
RBC # BLD: 3.7 10^6/ΜL
SODIUM BLD-SCNC: 138 MMOL/L
SODIUM BLD-SCNC: 142 MMOL/L
SODIUM BLD-SCNC: 143 MMOL/L
SODIUM BLD-SCNC: 143 MMOL/L
SODIUM BLD-SCNC: 144 MMOL/L
SODIUM BLD-SCNC: 146 MMOL/L
SPECIFIC GRAVITY, URINE: 1.01
TOTAL PROTEIN: 6.8
UROBILINOGEN, URINE: NORMAL
VITAMIN D 25-HYDROXY: 72
VITAMIN D2, 25 HYDROXY: NORMAL
VITAMIN D3,25 HYDROXY: NORMAL
WBC # BLD: 3.3 10^3/ML

## 2022-01-01 PROCEDURE — 1036F TOBACCO NON-USER: CPT | Performed by: ANESTHESIOLOGY

## 2022-01-01 PROCEDURE — 7100000010 HC PHASE II RECOVERY - FIRST 15 MIN: Performed by: ANESTHESIOLOGY

## 2022-01-01 PROCEDURE — 64493 INJ PARAVERT F JNT L/S 1 LEV: CPT | Performed by: ANESTHESIOLOGY

## 2022-01-01 PROCEDURE — 99213 OFFICE O/P EST LOW 20 MIN: CPT | Performed by: NURSE PRACTITIONER

## 2022-01-01 PROCEDURE — G8427 DOCREV CUR MEDS BY ELIG CLIN: HCPCS | Performed by: INTERNAL MEDICINE

## 2022-01-01 PROCEDURE — 1123F ACP DISCUSS/DSCN MKR DOCD: CPT | Performed by: INTERNAL MEDICINE

## 2022-01-01 PROCEDURE — G8427 DOCREV CUR MEDS BY ELIG CLIN: HCPCS | Performed by: ANESTHESIOLOGY

## 2022-01-01 PROCEDURE — 2709999900 HC NON-CHARGEABLE SUPPLY: Performed by: ANESTHESIOLOGY

## 2022-01-01 PROCEDURE — 99204 OFFICE O/P NEW MOD 45 MIN: CPT | Performed by: ANESTHESIOLOGY

## 2022-01-01 PROCEDURE — G8417 CALC BMI ABV UP PARAM F/U: HCPCS | Performed by: NURSE PRACTITIONER

## 2022-01-01 PROCEDURE — 99152 MOD SED SAME PHYS/QHP 5/>YRS: CPT | Performed by: ANESTHESIOLOGY

## 2022-01-01 PROCEDURE — 93296 REM INTERROG EVL PM/IDS: CPT | Performed by: NUCLEAR MEDICINE

## 2022-01-01 PROCEDURE — 6360000002 HC RX W HCPCS: Performed by: ANESTHESIOLOGY

## 2022-01-01 PROCEDURE — G8484 FLU IMMUNIZE NO ADMIN: HCPCS | Performed by: NUCLEAR MEDICINE

## 2022-01-01 PROCEDURE — 1123F ACP DISCUSS/DSCN MKR DOCD: CPT | Performed by: NUCLEAR MEDICINE

## 2022-01-01 PROCEDURE — 4040F PNEUMOC VAC/ADMIN/RCVD: CPT | Performed by: INTERNAL MEDICINE

## 2022-01-01 PROCEDURE — 64494 INJ PARAVERT F JNT L/S 2 LEV: CPT | Performed by: ANESTHESIOLOGY

## 2022-01-01 PROCEDURE — 99213 OFFICE O/P EST LOW 20 MIN: CPT | Performed by: NUCLEAR MEDICINE

## 2022-01-01 PROCEDURE — 93297 REM INTERROG DEV EVAL ICPMS: CPT | Performed by: INTERNAL MEDICINE

## 2022-01-01 PROCEDURE — 1123F ACP DISCUSS/DSCN MKR DOCD: CPT | Performed by: ANESTHESIOLOGY

## 2022-01-01 PROCEDURE — 2500000003 HC RX 250 WO HCPCS: Performed by: ANESTHESIOLOGY

## 2022-01-01 PROCEDURE — 99214 OFFICE O/P EST MOD 30 MIN: CPT | Performed by: ANESTHESIOLOGY

## 2022-01-01 PROCEDURE — G8417 CALC BMI ABV UP PARAM F/U: HCPCS | Performed by: INTERNAL MEDICINE

## 2022-01-01 PROCEDURE — 93295 DEV INTERROG REMOTE 1/2/MLT: CPT | Performed by: NUCLEAR MEDICINE

## 2022-01-01 PROCEDURE — 99204 OFFICE O/P NEW MOD 45 MIN: CPT | Performed by: INTERNAL MEDICINE

## 2022-01-01 PROCEDURE — G8417 CALC BMI ABV UP PARAM F/U: HCPCS | Performed by: ANESTHESIOLOGY

## 2022-01-01 PROCEDURE — G8427 DOCREV CUR MEDS BY ELIG CLIN: HCPCS | Performed by: NUCLEAR MEDICINE

## 2022-01-01 PROCEDURE — 1036F TOBACCO NON-USER: CPT | Performed by: NUCLEAR MEDICINE

## 2022-01-01 PROCEDURE — 7100000011 HC PHASE II RECOVERY - ADDTL 15 MIN: Performed by: ANESTHESIOLOGY

## 2022-01-01 PROCEDURE — G2066 INTER DEVC REMOTE 30D: HCPCS | Performed by: NUCLEAR MEDICINE

## 2022-01-01 PROCEDURE — G8417 CALC BMI ABV UP PARAM F/U: HCPCS | Performed by: NUCLEAR MEDICINE

## 2022-01-01 PROCEDURE — G2066 INTER DEVC REMOTE 30D: HCPCS | Performed by: INTERNAL MEDICINE

## 2022-01-01 PROCEDURE — 1036F TOBACCO NON-USER: CPT | Performed by: INTERNAL MEDICINE

## 2022-01-01 PROCEDURE — 3600000056 HC PAIN LEVEL 4 BASE: Performed by: ANESTHESIOLOGY

## 2022-01-01 PROCEDURE — 3209999900 FLUORO FOR SURGICAL PROCEDURES

## 2022-01-01 PROCEDURE — 93297 REM INTERROG DEV EVAL ICPMS: CPT | Performed by: NUCLEAR MEDICINE

## 2022-01-01 PROCEDURE — 1036F TOBACCO NON-USER: CPT | Performed by: NURSE PRACTITIONER

## 2022-01-01 PROCEDURE — G8427 DOCREV CUR MEDS BY ELIG CLIN: HCPCS | Performed by: NURSE PRACTITIONER

## 2022-01-01 PROCEDURE — 4040F PNEUMOC VAC/ADMIN/RCVD: CPT | Performed by: NUCLEAR MEDICINE

## 2022-01-01 PROCEDURE — 1123F ACP DISCUSS/DSCN MKR DOCD: CPT | Performed by: NURSE PRACTITIONER

## 2022-01-01 PROCEDURE — 99213 OFFICE O/P EST LOW 20 MIN: CPT | Performed by: INTERNAL MEDICINE

## 2022-01-01 PROCEDURE — 3600000057 HC PAIN LEVEL 4 ADDL 15 MIN: Performed by: ANESTHESIOLOGY

## 2022-01-01 RX ORDER — MIDAZOLAM HYDROCHLORIDE 1 MG/ML
INJECTION INTRAMUSCULAR; INTRAVENOUS PRN
Status: DISCONTINUED | OUTPATIENT
Start: 2022-01-01 | End: 2022-01-01 | Stop reason: ALTCHOICE

## 2022-01-01 RX ORDER — TAMSULOSIN HYDROCHLORIDE 0.4 MG/1
0.4 CAPSULE ORAL DAILY
COMMUNITY

## 2022-01-01 RX ORDER — LIDOCAINE HYDROCHLORIDE 10 MG/ML
INJECTION, SOLUTION EPIDURAL; INFILTRATION; INTRACAUDAL; PERINEURAL PRN
Status: DISCONTINUED | OUTPATIENT
Start: 2022-01-01 | End: 2022-01-01 | Stop reason: ALTCHOICE

## 2022-01-01 RX ORDER — ACETAMINOPHEN 160 MG
TABLET,DISINTEGRATING ORAL
COMMUNITY

## 2022-01-01 RX ORDER — TORSEMIDE 20 MG/1
30 TABLET ORAL DAILY
Qty: 135 TABLET | Refills: 3 | Status: SHIPPED | OUTPATIENT
Start: 2022-01-01 | End: 2022-01-01 | Stop reason: DRUGHIGH

## 2022-01-01 RX ORDER — FENTANYL CITRATE 50 UG/ML
INJECTION, SOLUTION INTRAMUSCULAR; INTRAVENOUS PRN
Status: DISCONTINUED | OUTPATIENT
Start: 2022-01-01 | End: 2022-01-01 | Stop reason: ALTCHOICE

## 2022-01-01 RX ORDER — MIRTAZAPINE 15 MG/1
15 TABLET, FILM COATED ORAL DAILY
COMMUNITY
Start: 2022-01-01

## 2022-01-01 RX ORDER — BUPIVACAINE HYDROCHLORIDE 5 MG/ML
INJECTION, SOLUTION PERINEURAL PRN
Status: DISCONTINUED | OUTPATIENT
Start: 2022-01-01 | End: 2022-01-01 | Stop reason: ALTCHOICE

## 2022-01-01 RX ORDER — AMIODARONE HYDROCHLORIDE 200 MG/1
TABLET ORAL
Qty: 90 TABLET | Refills: 3 | Status: SHIPPED | OUTPATIENT
Start: 2022-01-01

## 2022-01-01 RX ORDER — TORSEMIDE 20 MG/1
20 TABLET ORAL DAILY
Qty: 90 TABLET | Refills: 3 | Status: SHIPPED
Start: 2022-01-01

## 2022-01-01 RX ORDER — OXYCODONE HYDROCHLORIDE AND ACETAMINOPHEN 5; 325 MG/1; MG/1
0.5 TABLET ORAL 2 TIMES DAILY PRN
Qty: 30 TABLET | Refills: 0 | Status: SHIPPED
Start: 2022-01-01 | End: 2022-01-01 | Stop reason: ALTCHOICE

## 2022-01-01 RX ORDER — POTASSIUM CHLORIDE 20 MEQ/1
TABLET, EXTENDED RELEASE ORAL
Qty: 180 TABLET | Refills: 3 | Status: SHIPPED | OUTPATIENT
Start: 2022-01-01

## 2022-01-01 RX ORDER — METOPROLOL SUCCINATE 25 MG/1
25 TABLET, EXTENDED RELEASE ORAL DAILY
Qty: 90 TABLET | Refills: 2 | Status: SHIPPED | OUTPATIENT
Start: 2022-01-01 | End: 2022-01-01

## 2022-01-01 RX ORDER — METOLAZONE 5 MG/1
5 TABLET ORAL DAILY PRN
Qty: 15 TABLET | Refills: 1 | Status: SHIPPED | OUTPATIENT
Start: 2022-01-01

## 2022-01-01 ASSESSMENT — ENCOUNTER SYMPTOMS
ABDOMINAL DISTENTION: 0
COUGH: 0
NAUSEA: 0
SHORTNESS OF BREATH: 1
VOMITING: 0

## 2022-01-01 ASSESSMENT — PAIN - FUNCTIONAL ASSESSMENT
PAIN_FUNCTIONAL_ASSESSMENT: 0-10
PAIN_FUNCTIONAL_ASSESSMENT: 0-10

## 2022-01-01 ASSESSMENT — PAIN DESCRIPTION - DESCRIPTORS: DESCRIPTORS: ACHING

## 2022-01-25 NOTE — PROGRESS NOTES
Pt C/O swelling in feet at times,       Pt denies CP, SOB, Headache, dizziness, heart palpitations,fatigue

## 2022-01-25 NOTE — PROGRESS NOTES
[address] [address] [address] [facility],4Th Floor [address] [facility]  Dept: [phone]  Dept Fax: [phone]  Loc: [phone]    Visit Date: 1/25/2022    Myke Jaramillo is a 78 y.o. male who presents todayfor:  Chief Complaint   Patient presents with    6 Month Follow-Up     Dialated cardiomyopathy    Cardiomyopathy    Atrial Fibrillation   known CMP and A fib   No ICD shocks  No chest pain  General fatigue  Some dizziness  No syncope  No ICD issues     Bp is stable  No dizziness        HPI:  HPI  Past Medical History:   Diagnosis Date    Arthritis     Atrial fibrillation [facility])     [employer] single ICD 1/6/2016    Cardiomyopathy ([address]     Cardiomyopathy, likely nonischemic based on the patient's description. However, don't have a cardiac cath report from [facility].  Hypertension     ICD (implantable cardiac defibrillator) battery depletion: 9/24/2013: Generator replacement using [employer] 9/24/2013 9/24/2013: Generator replacement using [employer] device. Atrial lead was capped. Dr. Samia Osborne ICD (implantable cardiac defibrillator), dual, in situ     [employer] dual ICD    Low blood pressure     Low blood pressure with rapid atrial fibrillation.  Prolonged emergence from general anesthesia       Past Surgical History:   Procedure Laterality Date    CHOLECYSTECTOMY      COLONOSCOPY      DOPPLER ECHOCARDIOGRAPHY  7 08 2010    Global LV dilatation and dysfunction. EF 35%. Moderate biatrial enlargement. Mild mitral regurgitation and mild tricuspid regurgitation. No obvious stenotic valves. Borderline to mild pulmonary hypertension. No pericardial effusion.     HAND SURGERY  06/26/2017   Koenig HERNIA REPAIR  8 03 [address] [place], [place].    OTHER SURGICAL HISTORY Left 01/20/2017    Left CMC arthroplasty    PACEMAKER PLACEMENT      UPPER GASTROINTESTINAL ENDOSCOPY  12/2016    with dilation in [facility]     Family History   Problem Relation Age of Onset    Alzheimer's Disease Father     Cancer Brother         Brother had throat cancer.  Heart Disease Brother         Another brother had heart disease.  Pacemaker Brother      Social History     Tobacco Use    Smoking status: Former Smoker     Types: Cigarettes     Quit date: 1989     Years since quittin.0    Smokeless tobacco: Former User     Types: Chew    Tobacco comment: Patient states, Daryl Olmedo smokes a pipe occasionally. Substance Use Topics    Alcohol use: No      Current Outpatient Medications   Medication Sig Dispense Refill    potassium chloride (KLOR-CON M) 20 MEQ extended release tablet TAKE 1 TABLET BY MOUTH  TWICE DAILY 180 tablet 3    amiodarone (CORDARONE) 200 MG tablet TAKE 1 TABLET BY MOUTH  DAILY 90 tablet 3    sacubitril-valsartan (ENTRESTO) 24-26 MG per tablet Take 1 tablet by mouth 2 times daily 60 tablet 11    metoprolol succinate (TOPROL XL) 25 MG extended release tablet Take 1 tablet by mouth daily 90 tablet 2    Boswellia-Glucosamine-Vit D (OSTEO BI-FLEX ONE PER DAY) TABS Take by mouth 2 times daily      torsemide (DEMADEX) 20 MG tablet Take 1.5 tablets by mouth daily 135 tablet 3    acetaminophen (TYLENOL) 325 MG tablet Take 650 mg by mouth every 6 hours as needed for Pain      warfarin (COUMADIN) 5 MG tablet Take 5 mg by mouth every evening      Omega-3 Fatty Acids (FISH OIL) 1000 MG CAPS Take 1,000 mg by mouth 2 times daily       therapeutic multivitamin-minerals (THERAGRAN-M) tablet Take 1 tablet by mouth daily. No current facility-administered medications for this visit.      No Known Allergies  Health Maintenance   Topic Date Due    Depression Screen  Never done    DTaP/Tdap/Td vaccine (1 - Tdap) Never done    Shingles Vaccine (1 of 2) Never done    Pneumococcal 65+ years Vaccine (2 of 2 - PPSV23) 2016    Annual Wellness Visit (AWV)  Never done    TSH testing  2020    COVID-19 Vaccine (2 - Pfizer 3-dose series) 02/15/2021    Potassium monitoring  10/20/2022    Creatinine monitoring  10/20/2022    Flu vaccine  Completed    Hepatitis A vaccine  Aged Out    Hepatitis B vaccine  Aged Out    Hib vaccine  Aged Out    Meningococcal (ACWY) vaccine  Aged Out       Subjective:  Review of Systems  General:   No fever, no chills,some fatigue or weight loss  Pulmonary:    some dyspnea, no wheezing  Cardiac:    Denies recent chest pain,   GI:     No nausea or vomiting, no abdominal pain  Neuro:    No dizziness or light headedness,   Musculoskeletal:  No recent active issues  Extremities:   No edema, no obvious claudication       Objective:  Physical Exam  /72   Pulse 64   Ht 6' (1.829 m)   Wt 197 lb 3.2 oz (89.4 kg)   BMI 26.75 kg/m²   General:   Well developed, well nourished  Lungs:   Clear to auscultation  Heart:    Normal S1 S2, Slight murmur. no rubs, no gallops  Abdomen:   Soft, non tender, no organomegalies, positive bowel sounds  Extremities:   No edema, no cyanosis, good peripheral pulses  Neurological:   Awake, alert, oriented. No obvious focal deficits  Musculoskelatal:  No obvious deformities    Assessment:      Diagnosis Orders   1. Permanent atrial fibrillation ([address]     2. Dilated cardiomyopathy ([address]     as above  Cardiac fair for now     Plan:  No follow-ups on file. As above  Continue risk factor modification and medical management  Thank you for allowing me to participate in the care of your patient. Please don't hesitate to contact me regarding any further issues related to the patient care    Orders Placed:  No orders of the defined types were placed in this encounter. Medications Prescribed:  No orders of the defined types were placed in this encounter. Discussed use, benefit, and side effects of prescribed medications. All patient questions answered. Pt voicedunderstanding. Instructed to continue current medications, diet and exercise.  Continue risk factor modification and medical management. Patient agreed with treatment plan. Follow up as directed.     Electronically signedby Erin Harding MD on 1/25/2022 at 1:07 PM

## 2022-02-02 NOTE — PROGRESS NOTES
DR Dolly Patricia PT / KNOWN AFIB/ COUMADIN  AFIB BURDEN 21%    [facility] BIV ICD REMOTE   BATTERY 1.9 YRS REMAINING    ATRIAL IMPEDENCE 350  RV IMPEDENCE 260  LV IMPEDENCE 380  HV 51    P WAVES 0.2  RV WAVES NOT OBTAINED PER THE DEVICE     ATRIAL THRESHOLD NOT OBTAINED PER THE DEVICE   RV THRESHOLD 0.75 @ 0.5    LV THRESHOLD NOT OBTAINED PER THE DEVICE     VVIR 60    BV PACED >99%    CORVUE ELEVATED / PT OF MAYLIN  THIS ROUTED TO [address] [place] [address] A PHONE ENCOUNTER

## 2022-02-21 NOTE — TELEPHONE ENCOUNTER
Pre op Risk Assessment    Procedure Lumbar Epidural   Physician OIO  Date of surgery/procedure     Last OV 1-  Last Stress 3-02-18  Last Echo 02/17/2021  Last Cath 5-7-2018  Last Stent   Is patient on blood thinners Coumadin   Hold Meds/how many days 5 days     [phone]

## 2022-03-03 NOTE — TELEPHONE ENCOUNTER
Please ask patient if he has needed to take his metolazone frequently. Ordering routine blood work for a f/u.

## 2022-03-03 NOTE — TELEPHONE ENCOUNTER
Had not been filled since 10/21. Only takes maybe twice a month. Blood work already ordered from [address] [place] [address] Rd 10/21 for 8 months prior to next appt. Still needs blood work just ordered?

## 2022-03-04 NOTE — TELEPHONE ENCOUNTER
Called to wife to see how patient was taking meds.   K+ 30mEq daily  Entresto 24/26mg BID  Torsemide 30 mg daily

## 2022-03-04 NOTE — TELEPHONE ENCOUNTER
Dr Rina Walter that she was also concerned of elevated creatinine, labs though to be ordered by [facility] clinic, mentioned nephrology referral. Leia David to call her at cell [phone]

## 2022-03-07 NOTE — TELEPHONE ENCOUNTER
Due to jump of Cr. Please have patient hold Entresto, Torsemide and potassium x 4 days. Recheck lab work on 3/14. Please send chart to Dr. Satish Cisneros. Will most likely make another referral to nephrology after repeat labs.

## 2022-05-10 NOTE — PROGRESS NOTES
[facility] [place] [facility]  [facility]  [address] [address]  Dept: [phone]  Loc: [phone]  Outpatient Consult Note  5/10/2022 8:57 AM        Pt Name:    Paula Aranda:    1940  Primary Care Physician:  Nissa Banuelos     Chief Complaint:   Chief Complaint   Patient presents with    New Patient     RIK Barnhart- referral for CKD 3        Background Information/HPI   The patient is a 78 y.o. male with hx CKD, Systolic cardiomyopathy, AICD, Atrial fibrillation who is here for initial evaluation of elevated creatinine. Patient is not a good historian. Here with his wife. She provided most information. Has chronic leg swelling. Does not use oxygen. Chronically short of breath with exertion. No NSAID use. He worked as a farmer. Old labs show creat of 1.2 in Jan 2021 but then in March went upto 2.6 and since Feb/MArch 2022 staying around 2.1 to 2.6. Takes torsemide 30 mg daily, metolazone prn and K-dur 30 meq daily. No hx CVA. No hx cancer. No hx smoking. Wife reports patient usually weighs around 205 lbs. She says he did weigh 205 lbs at home today without his clothes or shoes. Allergies:  Patient has no known allergies. Past Medical History:  Past Medical History:   Diagnosis Date    Arthritis     Atrial fibrillation [facility])     [employer] single ICD 1/6/2016    Cardiomyopathy [facility])     Cardiomyopathy, likely nonischemic based on the patient's description. However, don't have a cardiac cath report from [facility].  Hypertension     ICD (implantable cardiac defibrillator) battery depletion: 9/24/2013: Generator replacement using [employer] 9/24/2013 9/24/2013: Generator replacement using [employer] device. Atrial lead was capped.  Dr. Arlene Christianson ICD (implantable cardiac defibrillator), dual, in situ     [employer] dual ICD    Low blood pressure     Low blood pressure with rapid atrial fibrillation.  Prolonged emergence from general anesthesia         Past Surgical History:  Past Surgical History:   Procedure Laterality Date    CHOLECYSTECTOMY      COLONOSCOPY      DOPPLER ECHOCARDIOGRAPHY  7 2010    Global LV dilatation and dysfunction. EF 35%. Moderate biatrial enlargement. Mild mitral regurgitation and mild tricuspid regurgitation. No obvious stenotic valves. Borderline to mild pulmonary hypertension. No pericardial effusion.  HAND SURGERY  2017   Zannie Cowden HERNIA REPAIR  [address]    OTHER SURGICAL HISTORY Left 2017    Left CMC arthroplasty    PACEMAKER PLACEMENT      UPPER GASTROINTESTINAL ENDOSCOPY  2016    with dilation in [place]        Family History:  Family History   Problem Relation Age of Onset    Alzheimer's Disease Father     Cancer Brother         Brother had throat cancer.  Heart Disease Brother         Another brother had heart disease.  Pacemaker Brother         Social History:  Social History     Socioeconomic History    Marital status:      Spouse name: Not on file    Number of children: Not on file    Years of education: Not on file    Highest education level: Not on file   Occupational History    Not on file   Tobacco Use    Smoking status: Former Smoker     Types: Cigarettes     Quit date: 1989     Years since quittin.3    Smokeless tobacco: Former User     Types: Chew    Tobacco comment: Patient statesCharisse smokes a pipe occasionally.    Vaping Use    Vaping Use: Never used   Substance and Sexual Activity    Alcohol use: No    Drug use: No    Sexual activity: Never   Other Topics Concern    Not on file   Social History Narrative    Not on file     Social Determinants of Health     Financial Resource Strain:     Difficulty of Paying Living Expenses: Not on file   Food Insecurity:     Worried About Running Out of Food in the Last Year: Not on file    Jose De Jesus hanson Food in the Last Year: Not on file   Transportation Needs:     Lack of Transportation (Medical): Not on file    Lack of Transportation (Non-Medical): Not on file   Physical Activity:     Days of Exercise per Week: Not on file    Minutes of Exercise per Session: Not on file   Stress:     Feeling of Stress : Not on file   Social Connections:     Frequency of Communication with Friends and Family: Not on file    Frequency of Social Gatherings with Friends and Family: Not on file    Attends Episcopal Services: Not on file    Active Member of [address] [place] [address] or Organizations: Not on file    Attends Club or Organization Meetings: Not on file    Marital Status: Not on file   Intimate Partner Violence:     Fear of Current or Ex-Partner: Not on file    Emotionally Abused: Not on file    Physically Abused: Not on file    Sexually Abused: Not on file   Housing Stability:     Unable to Pay for Housing in the Last Year: Not on file    Number of [place] in the Last Year: Not on file    Unstable Housing in the Last Year: Not on file        Review of Systems:  Constitutional: + weakness, chronic shortness of breath, leg swelling  Head: No headaches  Eyes: no blurry vision, no discharge  Ears: no ear pain or hearing changes  Nose: no runny nose or epistaxis  Respiratory: ++ shortness of breath (chronic)  Cardiovascular: + swelling  GI: no nausea, no vomiting or diarrhea  : denies any discharge  Skin: no rash  Musculoskeletal: + joint pain  Neuro: no numbness or tingling, no slurred speech  Psychiatric: stable mood, no depression or insomnia    All other review of systems were reviewed and negative     Home Medications:  Prior to Admission medications    Medication Sig Start Date End Date Taking?  Authorizing Provider   mirtazapine (REMERON) 15 MG tablet  3/22/22  Yes Historical Provider, MD   tamsulosin (FLOMAX) 0.4 MG capsule Take 0.4 mg by mouth daily   Yes Historical Provider, MD   Multiple Vitamins-Minerals (CENTRUM SILVER PO) Take by mouth   Yes Historical Provider, MD   Cholecalciferol (VITAMIN D3) 50 MCG (2000 UT) CAPS Take by mouth   Yes Historical Provider, MD   Calcium Carbonate-Vit D-Min (CALCIUM 1200 PO) Take by mouth   Yes Historical Provider, MD   torsemide (DEMADEX) 20 MG tablet Take 1.5 tablets by mouth daily 3/29/22  Yes FAITH Gonzalez CNP   metOLazone (ZAROXOLYN) 5 MG tablet Take 1 tablet by mouth daily as needed (as directed by HF clinic for weight gain/swelling) 3/3/22  Yes FAITH Gonzalez CNP   metoprolol succinate (TOPROL XL) 25 MG extended release tablet Take 1 tablet by mouth daily 1/25/22  Yes Maisha Chino MD   potassium chloride (KLOR-CON M) 20 MEQ extended release tablet TAKE 1 TABLET BY MOUTH  TWICE DAILY  Patient taking differently: Per wife taking 30mEq daily 1/4/22  Yes Maisha Chino MD   amiodarone (CORDARONE) 200 MG tablet TAKE 1 TABLET BY MOUTH  DAILY 1/4/22  Yes Maisha Chino MD   sacubitril-valsartan (ENTRESTO) 24-26 MG per tablet Take 1 tablet by mouth 2 times daily 12/13/21  Yes Maisha Chino MD   Boswellia-Glucosamine-Vit D (OSTEO BI-FLEX ONE PER DAY) TABS Take by mouth 2 times daily   Yes Historical Provider, MD   acetaminophen (TYLENOL) 325 MG tablet Take 650 mg by mouth every 6 hours as needed for Pain   Yes Historical Provider, MD   warfarin (COUMADIN) 5 MG tablet Take 5 mg by mouth every evening   Yes Historical Provider, MD   Omega-3 Fatty Acids (FISH OIL) 1000 MG CAPS Take 1,000 mg by mouth 2 times daily    Yes Historical Provider, MD        Physical Examination:  VITALS:  /80 (Site: Left Upper Arm, Position: Sitting, Cuff Size: Medium Adult)   Pulse 60   Wt 210 lb (95.3 kg)   SpO2 95%   BMI 28.48 kg/m²   Body mass index is 28.48 kg/m².   Wt Readings from Last 3 Encounters:   05/10/22 210 lb (95.3 kg)   01/25/22 197 lb 3.2 oz (89.4 kg)   10/20/21 196 lb 12.8 oz (89.3 kg)     Constitutional and General Appearance: alert and cooperative with exam, appears comfortable, no distress, not diaphoretic  Eyes: no icteric sclera in left eye or right eye,  no pallor conjunctiva in left or right eye, no discharge seen from left eye or right eye  Ears and Nose: normal external appearance of left and right ear  Oral: moist oral mucus membranes  Neck: No jugular venous distention  Lungs: Air entry B/L, no crackles or rales, no use of accessory muscles or labored breathing  Heart:  S1, S2  Extremities: B/L 2+ LE edema, no tenderness  GI: soft, non-tender, no guarding, no distention  Skin: no rash seen on exposed extremities  Musculo: moves all extremities   Neuro: no slurred speech  Psychiatric: Normal mood and affect, Not agitated     Laboratory & Diagnostics:  Old progress notes from referring physician reviewed. Radiology/US kidneys:   Echo:   Old labs reviewed:  EF 30%  Creat March 2022: 2.1  Feb 2022: Creat 2.6  Oct 2021: Creat 1.9       Impression/Plan:   1. CKD IV: secondary to chronic cardiorenal syndrome with chronic volume overload. Discussed thought process with patient. Reports volume status is stable at baseline weights with torsemide daily. Takes metolazone prn. Check UA, urine protein-creat ratio and kidney ultrasound to evaluate echogenicity and size. Monitor labs. Repeat labs now  2. Chronic systolic cardiomyopathy: EF 30%, on entresto, torsemide, Toprol. Continue to monitor weights. Advised low-salt diet  3. Electrolytes: Borderline hypokalemia: on replacement  4. Hx AICD  5. Atrial Fibrillation: on coumadin  6.  BPH: on Flomax    Thought process was discussed with the patient and wife  Thank you for the referral  Please do not hesitate to contact me if you have any questions or concerns  I will make further recommendations depending on clinical course and lab/diagnostic results    Orders Placed This Encounter   Procedures    US RENAL COMPLETE    Basic Metabolic Panel    CBC    PTH, Intact    Phosphorus    Vitamin D 25 Hydroxy    Urinalysis    Protein / creatinine ratio, urine     Return in about 4 weeks (around 6/7/2022).     Aquiles Pizarro MD  [facility]

## 2022-05-18 NOTE — PROGRESS NOTES
[facility] biv icd   Programmed VVIR     . [facility] Battery longevity:  1.8 years   Presenting rhythm  AF biv paced   corvue WNL    Atrial impedance 350  RV impedance 240    Shock 50    P wave sensing 0.4  R wave sensing not measured     >99 % RV paced     RV threshold 0.75 V at 0.5ms  LV not measured

## 2022-05-26 NOTE — TELEPHONE ENCOUNTER
Spoke to pt's wife, informed her that pt needs to repeat labs next week. She wanted to know why we are repeating labs again. I informed her that we are keeping an eye on pt's kidney function and creat level. Lab order pending.

## 2022-06-06 NOTE — PROGRESS NOTES
Chronic Pain/PM&R Clinic Note     Encounter Date: 6/6/22    Subjective:   Chief Complaint:   Chief Complaint   Patient presents with    New Patient     lumbar pain       History of Present Illness:   Sharmaine Layton is a 78 y.o. male seen in the clinic initially on 06/06/22 upon request from Misty Bryan MD (orthopedic surgery) for his history of chronic low back pain. He has medical history of CAD, A. fib (on Coumadin), CHF, and ICD placement. He has been dealing with low back pain for several years that is progressively getting worse. He describes the majority of his back pain to be in the small of his low back at his waistline. He states that this pain is a constant aching, stabbing pain that he rates as a 5/10. He states that this pain is worse with any activity where he is standing up straight with his cane or doing any back extension maneuvers. His pain is improved with rest.  He states he has been managing pain with use of Tylenol and has not tried other medications. He feels like they are not very effective. He met with the orthopedic surgery team who performed some lumbar epidural steroid injections which she feels like helped somewhat but continues to have low back pain. He denies any pain radiating further down his leg, focal leg weakness, leg numbness/tingling, persistent groin numbness, or bowel/bladder incontinence episodes. History of Interventions:   Surgery: No previous lumbar surgeries  Injections: None    Current Treatment Medications:   Tylenol PRN    Historical Treatment Medications:   Tramadol - ineffective    Imaging/Studies:  CT L-spine (10/21/21)        Past Medical History:   Diagnosis Date    Arthritis     Atrial fibrillation [facility])     [employer] single ICD 1/6/2016    Cardiomyopathy ([facility] [address]     Cardiomyopathy, likely nonischemic based on the patient's description. However, don't have a cardiac cath report from [facility].     Hypertension     ICD (implantable cardiac defibrillator) battery depletion: 9/24/2013: Generator replacement using [employer] 9/24/2013 9/24/2013: Generator replacement using [employer] device. Atrial lead was capped. Dr. Konstantin Alegria ICD (implantable cardiac defibrillator), dual, in situ     [employer] dual ICD    Low blood pressure     Low blood pressure with rapid atrial fibrillation.  Prolonged emergence from general anesthesia        Past Surgical History:   Procedure Laterality Date    CHOLECYSTECTOMY      COLONOSCOPY      CYSTOSCOPY  05/11/2021    DOPPLER ECHOCARDIOGRAPHY  7 08 2010    Global LV dilatation and dysfunction. EF 35%. Moderate biatrial enlargement. Mild mitral regurgitation and mild tricuspid regurgitation. No obvious stenotic valves. Borderline to mild pulmonary hypertension. No pericardial effusion.  HAND SURGERY  06/26/2017   [facility] HERNIA REPAIR  [address] [place], [place].    OTHER SURGICAL HISTORY Left 01/20/2017    Left CMC arthroplasty    PACEMAKER PLACEMENT      x3    UPPER GASTROINTESTINAL ENDOSCOPY  12/2016    with dilation in [place]       Family History   Problem Relation Age of Onset    Alzheimer's Disease Father     Cancer Brother         Brother had throat cancer.  Heart Disease Brother         Another brother had heart disease.      Pacemaker Brother          Medications & Allergies:   Current Outpatient Medications   Medication Instructions    acetaminophen (TYLENOL) 650 mg, Oral, EVERY 6 HOURS PRN    amiodarone (CORDARONE) 200 MG tablet TAKE 1 TABLET BY MOUTH  DAILY    Bos[place]-Glucosamine-Vit D (OSTEO BI-FLEX ONE PER DAY) TABS Oral, 2 TIMES DAILY    Calcium Carbonate-Vit D-Min (CALCIUM 1200 PO) Oral    Cholecalciferol (VITAMIN D3) 50 MCG (2000 UT) CAPS Oral    fish oil 1,000 mg, Oral, 2 TIMES DAILY    metOLazone (ZAROXOLYN) 5 mg, Oral, DAILY PRN    metoprolol tartrate (LOPRESSOR) 25 mg, Oral, DAILY    mirtazapine (REMERON) 15 mg, Oral, DAILY    Multiple Vitamins-Minerals (CENTRUM SILVER PO) Oral    oxyCODONE-acetaminophen (PERCOCET) 5-325 MG per tablet 0.5 tablets, Oral, 2 TIMES DAILY PRN    potassium chloride (KLOR-CON M) 20 MEQ extended release tablet TAKE 1 TABLET BY MOUTH  TWICE DAILY    sacubitril-valsartan (ENTRESTO) 24-26 MG per tablet 1 tablet, Oral, 2 TIMES DAILY    tamsulosin (FLOMAX) 0.4 mg, Oral, DAILY    torsemide (DEMADEX) 30 mg, Oral, DAILY    warfarin (COUMADIN) 5 mg, Oral, EVERY EVENING       No Known Allergies    Review of Systems:   Constitutional: negative for weight changes or fevers  Genitourinary: negative for bowel/bladder incontinence   Musculoskeletal: positive for low back pain  Neurological: negative for any leg weakness or numbness/tingling  Behavioral/Psych: negative for anxiety/depression   All other systems reviewed and are negative    Objective:     Vitals:    06/06/22 1459   BP: 106/64       Constitutional: Pleasant, no acute distress   Head: Normocephalic, atraumatic   Eyes: Conjunctivae normal   Neck: Supple, symmetrical   Lungs: Normal respiratory effort, non-labored breathing   Cardiovascular: Limbs warm and well perfused   Abdomen: Non-protruded   Musculoskeletal: Muscle bulk symmetric, no atrophy, no gross deformities   · Lower Extremities: appreciable swelling in lower limbs  · Thorax: kyphosis appreciated  · Lumbar Spine: ROM severely reduced in extension. Lumbar paraspinals tender to palpation bilaterally. SLR neg bilaterally. Positive facet loading bilaterally. Bilateral SI joints non-tender to palpation. Neurological: Cranial nerves II-XII grossly intact. · Gait - Antalgic gait. Ambulates with assistive device.    · Motor: 5/5 muscle strength in bilateral hip flexion, knee flexion, knee extension, ankle dorsiflexion, and ankle plantar flexion   · Sensory: LT sensation intact in lower limbs   · Reflexes: negative ankle clonus  Skin: No rashes or lesions visible in low back  Psychological: Cooperative, no exaggerated pain behaviors       Assessment:    Diagnosis Orders   1. Lumbosacral spondylosis without myelopathy  CHG FLUOR NEEDLE/CATH SPINE/PARASPINAL DX/THER ADDON    ME INJ DX/THER AGNT PARAVERT FACET JOINT, LUMBAR/SAC, 1ST LEVEL    ME INJ DX/THER AGNT PARAVERT FACET JOINT, LUMBAR/SAC, 2ND LEVEL   2. Chronic pain syndrome  oxyCODONE-acetaminophen (PERCOCET) 5-325 MG per tablet   3. Myofascial pain     4. Degenerative disc disease, lumbar     5. Anticoagulated           Jhonatan Renteria is a 78 y.o.male presenting to the pain clinic for evaluation of chronic low back pain. His clinical history and physical examination are consistent with lumbar facet mediated pain secondary lumbar spondylosis and degenerative disc disease with an associated myofascial pain component. I will set the patient up for diagnostic lumbar medial branch blocks targeting the bilateral facet joints at L4/L5 and L5/S1. We will permission to hold his Coumadin. Plan: The following treatment recommendations and plan were discussed in detail with Jhonatan Renteria. Imaging/Studies/Labs:   I have reviewed patients imaging of CT L-spine report and results were discussed with patient today. Analgesics:   For his chronic pain, I started patient on low-dose Percocet 2.5 mg he can take up to twice a day as needed for severe pain (pain greater than 7/10). Patient is advised take this medication as prescribed is taking more than prescribed can lead development of tolerance, physical dependence, addiction. OARRS reviewed and is appropriate. Pain contract signed. Patient is taking Acetaminophen. Patient informed that the maximum amount of acetaminophen taken on a regular basis should only be 4000 mg per day. Adjuvants:   None    Interventions: In presence of lumbar axial back pain/cervical neck and with physical exam consistent for facetal pain, the option of medial branch blocks at bilateral L4/L5 and L5/S1 was chosen.  The risks and benefits were discussed in detail with the patient. Patient wants to proceed with the injection. Anticoagulation/NPO Recommendations:   Patient will need to hold Coumadin x 5 days prior to the procedure. Patient will need to be NPO x 8 hours prior to the procedure. Plan to start an IV prior to the procedure. Multidisciplinary Pain Management:   In the presence of complex, chronic, and multi-factorial pain, the importance of a multidisciplinary approach to pain management in the patients management regimen was emphasized and discussed in great detail.    PHYSICAL THERAPY: Patient is advised to continue gentle ROM/stretching exercises    Referrals:  None    Prescriptions Written This Visit:   Percocet 5 mg (#30, 0 refills)    Follow-up: For lumbar MBBs      Vanna Pratt, DO  Interventional Pain Management/PM&R   [place]

## 2022-06-07 NOTE — TELEPHONE ENCOUNTER
Pharmacy called and will need a PA for Percocet. Will  Let Riana start and since med naive will only issue 7 days of 30 day script.

## 2022-06-07 NOTE — TELEPHONE ENCOUNTER
Pre op Risk Assessment    Procedure BI-LAT LUMBAR MBB  Physician DR Jem Hernandez  Date of surgery/procedure TBD    Last OV 1-  Last Stress 2-27-18  Last Echo 2-4-21  Last Cath 4-9-18    Is patient on blood thinners COUMADIN  Hold Meds/how many days 5 DAYS? ?

## 2022-06-07 NOTE — PROGRESS NOTES
[facility] [place] [facility]  [facility]  [address] [address]  Dept: [phone]  Loc: [phone]  Office Progress Note  6/7/2022 10:21 AM      Pt Name:    Zelda Prom:    1940  Primary Care Physician:  Caridad Isaac     Chief Complaint:   Chief Complaint   Patient presents with    Chronic Kidney Disease     Stage 4        Background Information/Interval History:   The patient is a 78 y.o. male with hx CKD, Systolic cardiomyopathy, AICD, Atrial fibrillation who is here for follow-up evaluation of elevated creatinine. Patient is not a good historian. Here with his wife. She provided most information. Has chronic leg swelling. Old labs show creat of 1.2 in Jan 2021 but then in March went upto 2.6 and since Feb/MArch 2022 staying around 2.1 to 2.6. Takes torsemide 30 mg daily, metolazone prn and K-dur 30 meq daily. No sp complaints. No urinary complaints. Blood pressure is reported to be stable. Past History:  Past Medical History:   Diagnosis Date    Arthritis     Atrial fibrillation [facility])     [employer] single ICD 1/6/2016    Cardiomyopathy [facility])     Cardiomyopathy, likely nonischemic based on the patient's description. However, don't have a cardiac cath report from [address] [place] [address].  Hypertension     ICD (implantable cardiac defibrillator) battery depletion: 9/24/2013: Generator replacement using [facility] [employer] 9/24/2013 9/24/2013: Generator replacement using [facility] device. Atrial lead was capped. Dr. Pryor Speak ICD (implantable cardiac defibrillator), dual, in situ     [employer] dual ICD    Low blood pressure     Low blood pressure with rapid atrial fibrillation.      Prolonged emergence from general anesthesia      Past Surgical History:   Procedure Laterality Date    CHOLECYSTECTOMY      COLONOSCOPY      CYSTOSCOPY  05/11/2021    DOPPLER ECHOCARDIOGRAPHY  7 08 2010    Global LV dilatation and dysfunction. EF 35%. Moderate biatrial enlargement. Mild mitral regurgitation and mild tricuspid regurgitation. No obvious stenotic valves. Borderline to mild pulmonary hypertension. No pericardial effusion.  HAND SURGERY  06/26/2017   [facility] HERNIA REPAIR  [address] [place], [place].    OTHER SURGICAL HISTORY Left 01/20/2017    Left CMC arthroplasty    PACEMAKER PLACEMENT      x3    UPPER GASTROINTESTINAL ENDOSCOPY  12/2016    with dilation in [place]        VITALS:  /70 (Site: Left Upper Arm, Position: Sitting, Cuff Size: Medium Adult)   Pulse 64   Wt 199 lb (90.3 kg)   SpO2 94%   BMI 26.99 kg/m²   Wt Readings from Last 3 Encounters:   06/07/22 199 lb (90.3 kg)   06/06/22 210 lb (95.3 kg)   05/10/22 210 lb (95.3 kg)     Body mass index is 26.99 kg/m². General Appearance: alert and cooperative with exam, appears comfortable, no distress  Oral: moist oral mucus membranes  Neck: No jugular venous distention  Lungs: Air entry B/L, no crackles or rales, no use of accessory muscles  Heart: S1, S2 heard  GI: soft, non-tender, no guarding  Extremities: No sig LE edema     Medications:  Current Outpatient Medications   Medication Sig Dispense Refill    metoprolol tartrate (LOPRESSOR) 25 MG tablet Take 25 mg by mouth daily      oxyCODONE-acetaminophen (PERCOCET) 5-325 MG per tablet Take 0.5 tablets by mouth 2 times daily as needed for Pain for up to 30 days.  30 tablet 0    mirtazapine (REMERON) 15 MG tablet Take 15 mg by mouth daily       tamsulosin (FLOMAX) 0.4 MG capsule Take 0.4 mg by mouth daily      Multiple Vitamins-Minerals (CENTRUM SILVER PO) Take by mouth      Cholecalciferol (VITAMIN D3) 50 MCG (2000 UT) CAPS Take by mouth      Calcium Carbonate-Vit D-Min (CALCIUM 1200 PO) Take by mouth      torsemide (DEMADEX) 20 MG tablet Take 1.5 tablets by mouth daily 135 tablet 3    metOLazone (ZAROXOLYN) 5 MG tablet Take 1 tablet by mouth daily as needed (as directed by HF clinic for weight gain/swelling) 15 tablet 1    potassium chloride (KLOR-CON M) 20 MEQ extended release tablet TAKE 1 TABLET BY MOUTH  TWICE DAILY (Patient taking differently: Per wife taking 30mEq daily) 180 tablet 3    amiodarone (CORDARONE) 200 MG tablet TAKE 1 TABLET BY MOUTH  DAILY 90 tablet 3    sacubitril-valsartan (ENTRESTO) 24-26 MG per tablet Take 1 tablet by mouth 2 times daily 60 tablet 11    Boswellia-Glucosamine-Vit D (OSTEO BI-FLEX ONE PER DAY) TABS Take by mouth 2 times daily      acetaminophen (TYLENOL) 325 MG tablet Take 650 mg by mouth every 6 hours as needed for Pain      warfarin (COUMADIN) 5 MG tablet Take 5 mg by mouth every evening      Omega-3 Fatty Acids (FISH OIL) 1000 MG CAPS Take 1,000 mg by mouth 2 times daily        No current facility-administered medications for this visit. Laboratory & Diagnostics:  Old progress notes from referring physician reviewed. Radiology/US kidneys:   Echo:   Old labs reviewed:  EF 30%  Creat March 2022: 2.1  Feb 2022: Creat 2.6  Oct 2021: Creat 1.9    June 2022: K 3.9, Creat 3.0, Sodium 144 Hb 11.5, UPCR <148, Vit D 72  UA: no blood, no protein  US: R 10.1, L 11.4, Left benign cyst     Impression/Plan:   1. CKD IV with MOHAN: creat was 2.1 recently and now upto 3.0. has lost about 10 lbs. Took metolazone last week. Stop metolazone for now. He takes metolazone twice a month. Taking torsemide 30 mg daily, reduce torsemide to 20 mg daily. Check labs again  2. Chronic cardiorenal syndrome with chronic volume overload. Wts improved  3. Chronic systolic cardiomyopathy: EF 30%, on entresto, torsemide, Toprol. Wts down by 10 lbs  4. Electrolytes: Borderline hypokalemia: on replacement  5. Hx AICD  6. Atrial Fibrillation: on coumadin  7. BPH: on Flomax  8. Renal cyst: US May 2022 reviewed.      D/W pt and Wife    Orders Placed This Encounter   Procedures    Basic Metabolic Panel    Basic Metabolic Panel     Return in about 6 months (around 12/7/2022).     Luz Maria Carreon MD  [facility]

## 2022-06-13 NOTE — TELEPHONE ENCOUNTER
PA sent to plan    (Key: [id number])    Your information has been sent to [facility].     oxyCODONE-acetaminophen (PERCOCET) 5-325 MG per tablet

## 2022-06-15 NOTE — TELEPHONE ENCOUNTER
liam June 13  Request Reference Number: [id number]. OXYCOD/APAP TAB 5-325MG is approved through 07/13/2022. Your patient may now fill this prescription and it will be covered.

## 2022-06-16 NOTE — TELEPHONE ENCOUNTER
Pt's wife called to see if you had a chance to look at pt's labs and if he should keep taking torsemide 20 mg.

## 2022-06-17 NOTE — TELEPHONE ENCOUNTER
Creat is unchanged in last 2 weeks, at 3.0  Stable  Continue with torsemide current dose  Repeat BMP 1 month

## 2022-06-17 NOTE — TELEPHONE ENCOUNTER
Spoke to pt's wife, she understood that pt needs to take torsemide 20 mg daily and to repeat labs in 1 month.     Lab order pending

## 2022-06-22 NOTE — PATIENT INSTRUCTIONS
You may receive a survey regarding the care you received during your visit. Your input is valuable to us. We encourage you to complete and return your survey. We hope you will choose us in the future for your healthcare needs. Continue:  · Continue current medications  · Daily weights and record  · Fluid restriction of 2 Liters per day  · Limit sodium in diet to around 2322-4382 mg/day  · Monitor BP  · Activity as tolerated     Call the Heart Failure Clinic for any of the following symptoms: [phone]   Weight gain of 2-3 pounds in 1 day or 5 pounds in 1 week   Increased shortness of breath   Shortness of breath while laying down   Cough   Chest pain   Swelling in feet, ankles or legs   Tenderness or bloating in the abdomen   Fatigue    Decreased appetite or nausea    Confusion     Repeat blood in 3 months    No med changes today,   Going to call on Monday with blood pressures and HR on Monday, take 1 hr after medications in the morning. Continue diet/fluid adherence  Continue daily wts.   F/U w/ Cardiology  F/U in clinic in 6 months

## 2022-06-22 NOTE — PROGRESS NOTES
Heart Failure Clinic       Visit Date: 6/22/2022  Cardiologist:  Dr. Jeremías Wei  Primary Care Physician: Dr. Sury De Leon is a 78 y.o. male who presents today for:  Chief Complaint   Patient presents with    Follow-up    Congestive Heart Failure       HPI:     TYPE HF: HFrEF (30%, 2021) (20%, 2018) (40%, 2013)  Cause: nonischemic, afib (coumadin)  Device: ICD  HX: Afib (coumadin), HTN,   Dry Wt:  (196 on 10/20/22) (208 on 6/22/22)    Regan Sierra is a 78 y.o. male who presents to the office for a follow up patient visit in the heart failure clinic. Accompanied by wife, Tommie Keene. Concerns today: no concerns, is taking metolazone 5mg about every 14 days based off of weight gain and SOB. Nephrology had decreased Torsemide to 20 daily and instructed pt not to use metolazone anymore. Pt does not feel comfortable taking this off his routine. Visit on 10/20/22: no new or worsening of symptoms. Take metolazone about once a month      Hospitalization:  > 6 months      Activity: ADLs performed with FISCHER, FISCHER w/ long distances  Diet: low sodium low fluid followed    Patient has:  Chest Pain: no  SOB: Chronic FISCHER  Orthopnea/PND: no  SHARA: no  Edema: yes chronic, wears wraps   Fatigue: no  Abdominal bloating: no  Cough: no  Appetite: good      Past Medical History:   Diagnosis Date    Arthritis     Atrial fibrillation [facility])     [employer] single ICD 1/6/2016    Cardiomyopathy ([facility] [address]     Cardiomyopathy, likely nonischemic based on the patient's description. However, don't have a cardiac cath report from [address] [place] [address].  Hypertension     ICD (implantable cardiac defibrillator) battery depletion: 9/24/2013: Generator replacement using [employer] 9/24/2013 9/24/2013: Generator replacement using [employer] device. Atrial lead was capped.  Dr. Karen Padgett ICD (implantable cardiac defibrillator), dual, in situ     [employer] dual ICD    Low blood pressure     Low blood pressure gain/swelling) 15 tablet 1    potassium chloride (KLOR-CON M) 20 MEQ extended release tablet TAKE 1 TABLET BY MOUTH  TWICE DAILY (Patient taking differently: Per wife taking 30mEq daily) 180 tablet 3    amiodarone (CORDARONE) 200 MG tablet TAKE 1 TABLET BY MOUTH  DAILY 90 tablet 3    sacubitril-valsartan (ENTRESTO) 24-26 MG per tablet Take 1 tablet by mouth 2 times daily 60 tablet 11    Boswellia-Glucosamine-Vit D (OSTEO BI-FLEX ONE PER DAY) TABS Take by mouth 2 times daily      acetaminophen (TYLENOL) 325 MG tablet Take 650 mg by mouth every 6 hours as needed for Pain      warfarin (COUMADIN) 5 MG tablet Take 5 mg by mouth every evening      Omega-3 Fatty Acids (FISH OIL) 1000 MG CAPS Take 1,000 mg by mouth 2 times daily        No current facility-administered medications for this visit. No Known Allergies    SUBJECTIVE:   Review of Systems   Constitutional: Negative for activity change, appetite change, diaphoresis and fatigue. Respiratory: Positive for shortness of breath. Negative for cough. Cardiovascular: Positive for leg swelling. Negative for chest pain and palpitations. Gastrointestinal: Negative for abdominal distention, nausea and vomiting. Neurological: Negative for weakness, light-headedness and headaches. Hematological: Negative for adenopathy. Psychiatric/Behavioral: Negative for sleep disturbance. OBJECTIVE:   Today's Vitals:  BP (!) 84/49   Pulse 64   Ht 6' (1.829 m)   Wt 208 lb 6.4 oz (94.5 kg)   SpO2 91%   BMI 28.26 kg/m²     Physical Exam  Vitals reviewed. Constitutional:       General: He is not in acute distress. Appearance: Normal appearance. He is well-developed. He is not diaphoretic. HENT:      Head: Normocephalic and atraumatic. Eyes:      Conjunctiva/sclera: Conjunctivae normal.   Cardiovascular:      Rate and Rhythm: Normal rate and regular rhythm. Heart sounds: Murmur heard.        Pulmonary:      Effort: Pulmonary effort is normal. No respiratory distress. Breath sounds: Normal breath sounds. No wheezing or rales. Abdominal:      General: Bowel sounds are normal. There is no distension. Palpations: Abdomen is soft. Tenderness: There is no abdominal tenderness. Musculoskeletal:         General: Normal range of motion. Cervical back: Normal range of motion and neck supple. Right lower leg: Edema (trace) present. Left lower leg: Edema (+1) present. Skin:     General: Skin is warm and dry. Capillary Refill: Capillary refill takes less than 2 seconds. Neurological:      Mental Status: He is alert and oriented to person, place, and time. Coordination: Coordination normal.   Psychiatric:         Behavior: Behavior normal.         Wt Readings from Last 3 Encounters:   06/22/22 208 lb 6.4 oz (94.5 kg)   06/07/22 199 lb (90.3 kg)   06/06/22 210 lb (95.3 kg)     BP Readings from Last 3 Encounters:   06/22/22 (!) 84/49   06/07/22 130/70   06/06/22 106/64     Pulse Readings from Last 3 Encounters:   06/22/22 64   06/07/22 64   05/10/22 60     Body mass index is 28.26 kg/m². ECHO:   Summary   Ejection fraction is visually estimated at 30%. There was moderate global hypokinesis of the left ventricle. Markedly enlarged right atrium size. Severly dilated right ventricle. Aortic valve appears tricuspid. Aortic valve leaflets are somewhat thickened. Mild aortic stenosis is present. Mild-to-moderate tricuspid regurgitation. Signature      ----------------------------------------------------------------   Electronically signed by Debbie Carter MD (Interpreting   physician) on 02/17/2021 at 04:20 PM   ----------------------------------------------------------------      CATH/STRESS:   Conclusions      Procedure Summary   mild CAD   dilated CMP   no complication      Recommendations   medical Rx   consider upgrade to BIV ICD   consider Entresto if BP allows      Estimated Blood Loss:10 ml. Complications:No complications. Signatures      ----------------------------------------------------------------   Electronically signed by Romain Diallo MD (Performing   Physician) on 04/09/2018 at 16:48   ----------------------------------------------------------------        Results reviewed:  BNP:   Lab Results   Component Value Date     03/14/2022     CBC:   Lab Results   Component Value Date    WBC 3.3 05/24/2022    RBC 3.70 05/24/2022    HGB 11.5 05/24/2022    HCT 36.3 05/24/2022     05/24/2022     CMP:    Lab Results   Component Value Date     06/15/2022    K 3.7 06/15/2022    K 4.7 04/09/2018     06/15/2022    CO2 26 06/15/2022    BUN 43 06/15/2022    CREATININE 3.0 06/15/2022    LABGLOM 18 06/15/2022    LABGLOM 34 10/20/2021    GLUCOSE 114 06/15/2022    CALCIUM 9.8 06/15/2022     Hepatic Function Panel:    Lab Results   Component Value Date    ALKPHOS 127 02/22/2022    ALT 17 02/22/2022    AST 34 02/22/2022    PROT 6.9 12/13/2019    BILITOT 1.6 02/22/2022    BILIDIR 0.4 12/13/2019    IBILI 1.6 12/13/2019    LABALBU 4.0 02/22/2022     Magnesium:    Lab Results   Component Value Date    MG 2.0 03/14/2022     PT/INR:    Lab Results   Component Value Date    PROTIME 24.5 04/27/2018    INR 1.60 05/04/2018     Lipids:    Lab Results   Component Value Date    TRIG 136 03/09/2021    HDL 61 03/09/2021    LDLCALC 67 03/09/2021       ASSESSMENT AND PLAN:   The patient's condition/symptoms are Stable: No clinical evidence of fluid overload today. Continue current medical regimen without changes at present time. Diagnosis Orders   1. Chronic systolic congestive heart failure, NYHA class 3 (HCC)       Continue:  GDMT:   ACE/ARB/ARNI - Entresto 24/26 BID   BB - Toprol 25 Daily   Diuretic - Torsemide 20 daily, Metolazone 5mg PRN  AA - none  SGLT2 -  none  Vasodilator - none  Other - Amio, Coumadin, KCL 30 daily    HFrEF 30% Rt side/lt sided HF.  Pulmonary HTN d/t left sided HF vs lung disease   Stable, fluid on exam, no change from last visit in October. Noted that about every 14 days pts weight does get up to 206 and he becomes symptomatic, takes metolazone and weights get down to 199, slowly creep back up. Offered to make pt PRN and to follow only nephrology only. They would rather schedule with me in 6 months  Offered to do a SOB work up: supplemental oxygen? PFTs? Do not wish to do at this time. ECHO 2021: Mild AS, Mild to mod TR, severely dilated right ventricle, enlarged right atrium,  Cath 2018: mild CAD  Lab reviewed - K 3.7 Cr 3.0 Mag 2.0  Repeat blood in 3 months    No med changes today,   Asymptomatic hypotension  Going to call on Monday with blood pressures and HR on Monday, take 1 hr after medications in the morning. Continue diet/fluid adherence  Continue daily wts. F/U w/ Cardiology  F/U in clinic in 6 months      Tolerating above noted HF meds, no ill side effects noted. Will continue to monitor kidney function and electrolytes. Will optimize as tolerated. Pt is compliant w/ medications. Total visit time of 25 minutes has been spent with patient on education of symptoms, management, medication, and plan of care; as well as review of chart: labs, ECHO, radiology reports, etc.   I personally spent more then 50% of the appt time face to face with the patient. · Daily weights  · Fluid restriction of 2 Liters per day  · Limit sodium in diet to around 4060-4203 mg/day  · Monitor BP  · Activity as tolerated         Patient was instructed to call the [facility] for any changes in symptoms as noted in AVS.      Return in about 6 months (around 12/22/2022). or sooner if needed     Patient given educational materials - see patient instructions. We discussed the importance of weighing oneself and recording daily. We also discussed the importance of a low sodium diet, higher sodium foods to avoid and better low sodium food options.    Patient verbalizes understanding of plan of care using teach back method, and is agreeable to the treatment plan.        Electronically signed by FAITH Mcgregor CNP on 6/22/2022 at 12:03 PM

## 2022-06-27 NOTE — TELEPHONE ENCOUNTER
Wife called in with bp and hr log  First is prior to med second is 1 hr after am meds    6/23 115/64 50   93/58 62    6/24 111/81 53   113/56 64    6/25 114/70 64   94/56 70    6/26 111/70 64   101/53 60    6/27 124/73 59   103/63 61

## 2022-06-28 NOTE — H&P
post procedure directions / restrictions. All other questions and concerns addressed before patient discharge in ambulatory fashion. Chronic Pain/PM&R Clinic Note     Encounter Date: 6/6/22    Subjective:   Chief Complaint:   No chief complaint on file. History of Present Illness:   Arabella Cavazos is a 78 y.o. male seen in the clinic initially on 06/28/22 upon request from Joey Cook MD (orthopedic surgery) for his history of chronic low back pain. He has medical history of CAD, A. fib (on Coumadin), CHF, and ICD placement. He has been dealing with low back pain for several years that is progressively getting worse. He describes the majority of his back pain to be in the small of his low back at his waistline. He states that this pain is a constant aching, stabbing pain that he rates as a 5/10. He states that this pain is worse with any activity where he is standing up straight with his cane or doing any back extension maneuvers. His pain is improved with rest.  He states he has been managing pain with use of Tylenol and has not tried other medications. He feels like they are not very effective. He met with the orthopedic surgery team who performed some lumbar epidural steroid injections which she feels like helped somewhat but continues to have low back pain. He denies any pain radiating further down his leg, focal leg weakness, leg numbness/tingling, persistent groin numbness, or bowel/bladder incontinence episodes. History of Interventions:   Surgery: No previous lumbar surgeries  Injections: None    Current Treatment Medications:   Tylenol PRN    Historical Treatment Medications:   Tramadol - ineffective    Imaging/Studies:  CT L-spine (10/21/21)        Past Medical History:   Diagnosis Date    Arthritis     Atrial fibrillation [facility])     [employer] [facility] single ICD 1/6/2016    Cardiomyopathy ([facility] [address]     Cardiomyopathy, likely nonischemic based on the patient's description. However, don't have a cardiac cath report from [facility].  Hypertension     ICD (implantable cardiac defibrillator) battery depletion: 9/24/2013: Generator replacement using [employer] 9/24/2013 9/24/2013: Generator replacement using [employer] device. Atrial lead was capped. Dr. Jayant Williamson ICD (implantable cardiac defibrillator), dual, in situ     [employer] dual ICD    Low blood pressure     Low blood pressure with rapid atrial fibrillation.  Prolonged emergence from general anesthesia        Past Surgical History:   Procedure Laterality Date    CHOLECYSTECTOMY      COLONOSCOPY      CYSTOSCOPY  05/11/2021    DOPPLER ECHOCARDIOGRAPHY  7 08 2010    Global LV dilatation and dysfunction. EF 35%. Moderate biatrial enlargement. Mild mitral regurgitation and mild tricuspid regurgitation. No obvious stenotic valves. Borderline to mild pulmonary hypertension. No pericardial effusion.  HAND SURGERY  06/26/2017   [place] HERNIA REPAIR  [address] [place], [place].    OTHER SURGICAL HISTORY Left 01/20/2017    Left [facility] arthroplasty    PACEMAKER PLACEMENT      x3    UPPER GASTROINTESTINAL ENDOSCOPY  12/2016    with dilation in [place]       Family History   Problem Relation Age of Onset    Alzheimer's Disease Father     Cancer Brother         Brother had throat cancer.  Heart Disease Brother         Another brother had heart disease.      Pacemaker Brother          Medications & Allergies:   Current Outpatient Medications   Medication Instructions    acetaminophen (TYLENOL) 650 mg, Oral, EVERY 6 HOURS PRN    amiodarone (CORDARONE) 200 MG tablet TAKE 1 TABLET BY MOUTH  DAILY    Boswellia-Glucosamine-Vit D (OSTEO BI-FLEX ONE PER DAY) TABS Oral, 2 TIMES DAILY    Calcium Carbonate-Vit D-Min (CALCIUM 1200 PO) Oral    Cholecalciferol (VITAMIN D3) 50 MCG (2000 UT) CAPS Oral    fish oil 1,000 mg, Oral, 2 TIMES DAILY    metOLazone (ZAROXOLYN) 5 mg, Oral, DAILY PRN    metoprolol tartrate (LOPRESSOR) 25 mg, Oral, DAILY    mirtazapine (REMERON) 15 mg, Oral, DAILY    Multiple Vitamins-Minerals (CENTRUM SILVER PO) Oral    potassium chloride (KLOR-CON M) 20 MEQ extended release tablet TAKE 1 TABLET BY MOUTH  TWICE DAILY    sacubitril-valsartan (ENTRESTO) 24-26 MG per tablet 1 tablet, Oral, 2 TIMES DAILY    tamsulosin (FLOMAX) 0.4 mg, Oral, DAILY    torsemide (DEMADEX) 20 mg, Oral, DAILY    warfarin (COUMADIN) 5 mg, Oral, EVERY EVENING       No Known Allergies    Review of Systems:   Constitutional: negative for weight changes or fevers  Genitourinary: negative for bowel/bladder incontinence   Musculoskeletal: positive for low back pain  Neurological: negative for any leg weakness or numbness/tingling  Behavioral/Psych: negative for anxiety/depression   All other systems reviewed and are negative    Objective: There were no vitals filed for this visit. Constitutional: Pleasant, no acute distress   Head: Normocephalic, atraumatic   Eyes: Conjunctivae normal   Neck: Supple, symmetrical   Lungs: Normal respiratory effort, non-labored breathing   Cardiovascular: Limbs warm and well perfused   Abdomen: Non-protruded   Musculoskeletal: Muscle bulk symmetric, no atrophy, no gross deformities   · Lower Extremities: appreciable swelling in lower limbs  · Thorax: kyphosis appreciated  · Lumbar Spine: ROM severely reduced in extension. Lumbar paraspinals tender to palpation bilaterally. SLR neg bilaterally. Positive facet loading bilaterally. Bilateral SI joints non-tender to palpation. Neurological: Cranial nerves II-XII grossly intact. · Gait - Antalgic gait. Ambulates with assistive device.    · Motor: 5/5 muscle strength in bilateral hip flexion, knee flexion, knee extension, ankle dorsiflexion, and ankle plantar flexion   · Sensory: LT sensation intact in lower limbs   · Reflexes: negative ankle clonus  Skin: No rashes or lesions visible in low back  Psychological: Cooperative, no exaggerated pain behaviors Assessment:    Diagnosis Orders   1. Lumbosacral spondylosis without myelopathy  CHG FLUOR NEEDLE/CATH SPINE/PARASPINAL DX/THER ADDON    VA INJ DX/THER AGNT PARAVERT FACET JOINT, LUMBAR/SAC, 1ST LEVEL    VA INJ DX/THER AGNT PARAVERT FACET JOINT, LUMBAR/SAC, 2ND LEVEL   2. Chronic pain syndrome  oxyCODONE-acetaminophen (PERCOCET) 5-325 MG per tablet   3. Myofascial pain     4. Degenerative disc disease, lumbar     5. Anticoagulated           Mikaela Lopez is a 78 y.o.male presenting to the pain clinic for evaluation of chronic low back pain. His clinical history and physical examination are consistent with lumbar facet mediated pain secondary lumbar spondylosis and degenerative disc disease with an associated myofascial pain component. I will set the patient up for diagnostic lumbar medial branch blocks targeting the bilateral facet joints at L4/L5 and L5/S1. We will permission to hold his Coumadin. Plan: The following treatment recommendations and plan were discussed in detail with Mikaela Lopez. Imaging/Studies/Labs:   I have reviewed patients imaging of CT L-spine report and results were discussed with patient today. Analgesics:   For his chronic pain, I started patient on low-dose Percocet 2.5 mg he can take up to twice a day as needed for severe pain (pain greater than 7/10). Patient is advised take this medication as prescribed is taking more than prescribed can lead development of tolerance, physical dependence, addiction. OARRS reviewed and is appropriate. Pain contract signed. Patient is taking Acetaminophen. Patient informed that the maximum amount of acetaminophen taken on a regular basis should only be 4000 mg per day. Adjuvants:   None    Interventions: In presence of lumbar axial back pain/cervical neck and with physical exam consistent for facetal pain, the option of medial branch blocks at bilateral L4/L5 and L5/S1 was chosen.  The risks and benefits were discussed in detail with the patient. Patient wants to proceed with the injection. Anticoagulation/NPO Recommendations:   Patient will need to hold Coumadin x 5 days prior to the procedure. Patient will need to be NPO x 8 hours prior to the procedure. Plan to start an IV prior to the procedure. Multidisciplinary Pain Management:   In the presence of complex, chronic, and multi-factorial pain, the importance of a multidisciplinary approach to pain management in the patients management regimen was emphasized and discussed in great detail.    PHYSICAL THERAPY: Patient is advised to continue gentle ROM/stretching exercises    Referrals:  None    Prescriptions Written This Visit:   Percocet 5 mg (#30, 0 refills)    Follow-up: For lumbar MBBs      Betty Arreola,   Interventional Pain Management/PM&R   [place]

## 2022-06-29 NOTE — PROGRESS NOTES
Received to room 14. Opens eyes to name. Denies c/o pain, numbness, tingling, or nausea. Water given and patient taking well. Call light given to patient.  Wife at bedside

## 2022-06-29 NOTE — PROCEDURES
Pre-operative Diagnosis: Lumbar facet pain     Post-operative Diagnosis: Lumbar facet pain     Procedure: Bilateral lumbar medial branch blocks targeting facet joints of L4/L5 and L5/S1     Procedure Description:  After consent was obtained, the patient was placed in the prone position with a pillow under the abdomen to reduce the lordotic curve of the lumbar spine. The lower back was prepped with chloraprep and draped in a sterile fashion. Then, 0.5 cc of 1 % lidocaine was used for local anesthesia of the skin and superficial subcutaneous tissues. Three 22-gauge 3.5-inch spinal needles were advanced under fluoroscopy in an AP view: one at the sacral ala and two at the junction of the right superior articular process and the transverse process of the L4 and L5 vertebrae. There was no paresthesias, heme, or CSF obtained. Needle placement was confirmed using AP and oblique views. Then, 0.5 cc of 0.5% bupivacaine was injected at each site without complications. The procedure was repeated on the contralateral side. The patient tolerated the procedure well, transported to the recovery room, and discharged in ambulatory fashion.      Procedural Complications: None  Estimated Blood Loss: 0 mL      IV sedation was used during the procedure:  - Moderate intravenous conscious sedation was supervised by Dr. Valery Councilman  - The patient was independently monitored by a Registered Nurse assigned to the procedure room  - Monitoring included automated blood pressure, continuous EKG, and continuous pulse oximetry  - The detailed conscious record is permanently stored in the [place] [id number]  - The following is the conscious sedation record:  Start Time: 10:32  End Time : 10:47  Duration: 15 minutes   Medications Administered: 1 mg Versed, 50 mcg Fentanyl     Jef Casarez DO  Interventional Pain Management/PM&R   [facility]

## 2022-06-29 NOTE — PRE SEDATION
[facility]  Pre-Sedation/Analgesia History & Physical    Pt Name: Alize Major  MRN: [id number]  YOB: 1940  Provider Performing Procedure: Rupali Williamson DO   Primary Care Physician: [address]       has a past medical history of Arthritis, Atrial fibrillation [facility]), [employer] single ICD, Cardiomyopathy [facility]), Hypertension, ICD (implantable cardiac defibrillator) battery depletion: 9/24/2013: Generator replacement using [employer], ICD (implantable cardiac defibrillator), dual, in situ, Low blood pressure, and Prolonged emergence from general anesthesia. SURGICAL HISTORY   has a past surgical history that includes hernia repair (8 03 2007); doppler echocardiography (7 08 2010); Cholecystectomy; Upper gastrointestinal endoscopy (12/2016); other surgical history (Left, 01/20/2017); Hand surgery (06/26/2017); Colonoscopy; pacemaker placement; and Cystoscopy (05/11/2021). ALLERGIES   Allergies as of 06/09/2022    (No Known Allergies)       MEDICATIONS   Prior to Admission medications    Medication Sig Start Date End Date Taking?  Authorizing Provider   torsemide (DEMADEX) 20 MG tablet Take 1 tablet by mouth daily 6/9/22   Robyn Flores MD   metoprolol tartrate (LOPRESSOR) 25 MG tablet Take 25 mg by mouth daily    Historical Provider, MD   mirtazapine (REMERON) 15 MG tablet Take 15 mg by mouth daily  3/22/22   Historical Provider, MD   tamsulosin (FLOMAX) 0.4 MG capsule Take 0.4 mg by mouth daily    Historical Provider, MD   Multiple Vitamins-Minerals (CENTRUM SILVER PO) Take by mouth    Historical Provider, MD   Cholecalciferol (VITAMIN D3) 50 MCG (2000 UT) CAPS Take by mouth    Historical Provider, MD   Calcium Carbonate-Vit D-Min (CALCIUM 1200 PO) Take by mouth    Historical Provider, MD   metOLazone (ZAROXOLYN) 5 MG tablet Take 1 tablet by mouth daily as needed (as directed by HF clinic for weight gain/swelling) 3/3/22   Debbie FAITH Mendez - CNP   potassium chloride (KLOR-CON M) 20 MEQ extended release tablet TAKE 1 TABLET BY MOUTH  TWICE DAILY  Patient taking differently: Per wife taking 30mEq daily 1/4/22   Gabe Patel MD   amiodarone (CORDARONE) 200 MG tablet TAKE 1 TABLET BY MOUTH  DAILY 1/4/22   Gabe Patel MD   sacubitril-valsartan (ENTRESTO) 24-26 MG per tablet Take 1 tablet by mouth 2 times daily 12/13/21   Gabe Patel MD   Boswellia-Glucosamine-Vit D (OSTEO BI-FLEX ONE PER DAY) TABS Take by mouth 2 times daily    Historical Provider, MD   acetaminophen (TYLENOL) 325 MG tablet Take 650 mg by mouth every 6 hours as needed for Pain    Historical Provider, MD   warfarin (COUMADIN) 5 MG tablet Take 5 mg by mouth every evening    Historical Provider, MD   Omega-3 Fatty Acids (FISH OIL) 1000 MG CAPS Take 1,000 mg by mouth 2 times daily     Historical Provider, MD     PHYSICAL:   Vitals:    06/29/22 1045   BP: 105/61   Pulse: 62   Resp: 16   Temp: 97.6 °F (36.4 °C)   SpO2: 95%     PLANNED PROCEDURE   See procedure note  SEDATION  Planned agent: Versed and Fentanyl  ASA Classification: 2  Class 1: A normal healthy patient  Class 2: Pt with mild to moderate systemic disease  Class 3: Severe systemic disease or disturbance  Class 4: Severe systemic disorders that are already life threatening. Class 5: Moribund pt with little chances of survival, for more than 24 hours. Mallampati I Airway Classification: 2    1. Pre-procedure diagnostic studies complete and results available. 2. Previous sedation/anesthesia experiences assessed. 3. The patient is an appropriate candidate to undergo the planned procedure sedation and anesthesia. (Refer to nursing sedation/analgesia documentation record)  4. Formulation and discussion of sedation/procedure plan, risks, and expectations with patient and/or responsible adult completed. 5. Patient examined immediately prior to the procedure.  (Refer to nursing sedation/analgesia documentation record)    Katerina Freitas DO  Electronically signed 6/29/2022 at 11:23 AM

## 2022-06-29 NOTE — PROGRESS NOTES
Patient up and dressed with assistance from wife. Denies c/o pain, numbness, tingling, nausea. Discharged home in stable condition.  Out to private car by wheelchair, patient accompanied by son, daughter and spouse

## 2022-06-29 NOTE — POST SEDATION
[address] [address]  Sedation/Analgesia Post Sedation Record    Pt Name: Julia Haile  MRN: [id number]  YOB: 1940  Procedure Performed By: Pawan Pratt DO  Primary Care Physician: Emiliano Alejo    POST-PROCEDURE    Physicians/Assistants: Pawan Pratt DO  Procedure Performed: See Procedure Note   Sedation/Anesthesia: Versed and Fentanyl (See procedure note for amount and duration)  Estimated Blood Loss:     0  ml  Specimens Removed: None        Complications: None           Jef Guadarrama DO  Electronically signed 6/29/2022 at 11:23 AM

## 2022-07-12 NOTE — TELEPHONE ENCOUNTER
Pt wife Mesfin Lopez called per rosalinda, pt c/o more sob, wt gain 3 lbs from 206 to 209lb in 3 days. Asking to take metolazone 5 mg. V. O. Take 1 metolazone call back if no improvement. Spoke to Mesfin Lopez voiced understanding.     Debbie galdamez,

## 2022-07-13 NOTE — TELEPHONE ENCOUNTER
Spoke with wife  Patient urinated more  Feeling better  Will continue to monitor and if more wt gain or symptoms reoccur will contact office

## 2022-07-13 NOTE — PROGRESS NOTES
Chronic Pain/PM&R Clinic Note     Encounter Date: 7/13/22    Subjective:   Chief Complaint:   Chief Complaint   Patient presents with    Follow Up After Procedure       History of Present Illness:   Arabella Cavazos is a 78 y.o. male seen in the clinic initially on 06/06/22 upon request from Joey Cook MD (orthopedic surgery) for his history of chronic low back pain. He has medical history of CAD, A. fib (on Coumadin), CHF, and ICD placement. He has been dealing with low back pain for several years that is progressively getting worse. He describes the majority of his back pain to be in the small of his low back at his waistline. He states that this pain is a constant aching, stabbing pain that he rates as a 5/10. He states that this pain is worse with any activity where he is standing up straight with his cane or doing any back extension maneuvers. His pain is improved with rest.  He states he has been managing pain with use of Tylenol and has not tried other medications. He feels like they are not very effective. He met with the orthopedic surgery team who performed some lumbar epidural steroid injections which she feels like helped somewhat but continues to have low back pain. He denies any pain radiating further down his leg, focal leg weakness, leg numbness/tingling, persistent groin numbness, or bowel/bladder incontinence episodes. Today, 7/13/2022, patient resents for planned follow-up for chronic low back pain. He underwent diagnostic lumbar medial branch blocks on the 6/29/2022. He reports greater than 90% relief from this injection lasting 3 days in duration. He states that he has had really noticed significant improvement when he started out of his wheelchair. He states that this is where the majority of his pain is debilitating and is when he is up and out of his wheelchair.   He states he had significant improvement in his ability to stand upright and straighter as well during this timeframe. He would like to proceed with the confirmatory steps of his injection series. He denies any other new symptoms this point. History of Interventions:   Surgery: No previous lumbar surgeries  Injections: None    Current Treatment Medications:   Tylenol PRN    Historical Treatment Medications:   Tramadol - ineffective    Imaging/Studies:  CT L-spine (10/21/21)        Past Medical History:   Diagnosis Date    Arthritis     Atrial fibrillation [facility])     [employer] single ICD 1/6/2016    Cardiomyopathy ([facility] [address]     Cardiomyopathy, likely nonischemic based on the patient's description. However, don't have a cardiac cath report from [facility].  Hypertension     ICD (implantable cardiac defibrillator) battery depletion: 9/24/2013: Generator replacement using [employer] 9/24/2013 9/24/2013: Generator replacement using [employer] device. Atrial lead was capped. Dr. Christopher Coburn ICD (implantable cardiac defibrillator), dual, in situ     [employer] dual ICD    Low blood pressure     Low blood pressure with rapid atrial fibrillation.  Prolonged emergence from general anesthesia        Past Surgical History:   Procedure Laterality Date    CHOLECYSTECTOMY      COLONOSCOPY      CYSTOSCOPY  05/11/2021    DOPPLER ECHOCARDIOGRAPHY  7 08 2010    Global LV dilatation and dysfunction. EF 35%. Moderate biatrial enlargement. Mild mitral regurgitation and mild tricuspid regurgitation. No obvious stenotic valves. Borderline to mild pulmonary hypertension. No pericardial effusion.     HAND SURGERY  06/26/2017   Theodoro Milder HERNIA REPAIR  [phone] [place], [place].    OTHER SURGICAL HISTORY Left 01/20/2017    Left [facility] arthroplasty    PACEMAKER PLACEMENT      x3    PAIN MANAGEMENT PROCEDURE Bilateral 6/29/2022    lumbar facet medial branch blocks bilateral Lumbar4/5, 5/Sacral 1 performed by Owens Fleischer, DO at [address] [facility]  12/2016    with dilation in [facility]       Family History   Problem Relation Age of Onset    Alzheimer's Disease Father     Cancer Brother         Brother had throat cancer.  Heart Disease Brother         Another brother had heart disease.      Pacemaker Brother          Medications & Allergies:   Current Outpatient Medications   Medication Instructions    acetaminophen (TYLENOL) 650 mg, Oral, EVERY 6 HOURS PRN    amiodarone (CORDARONE) 200 MG tablet TAKE 1 TABLET BY MOUTH  DAILY    Bos[place]-Glucosamine-Vit D (OSTEO BI-FLEX ONE PER DAY) TABS Oral, 2 TIMES DAILY    Calcium Carbonate-Vit D-Min (CALCIUM 1200 PO) Oral    Cholecalciferol (VITAMIN D3) 50 MCG (2000 UT) CAPS Oral    fish oil 1,000 mg, Oral, 2 TIMES DAILY    metOLazone (ZAROXOLYN) 5 mg, Oral, DAILY PRN    metoprolol tartrate (LOPRESSOR) 25 mg, Oral, DAILY    mirtazapine (REMERON) 15 mg, Oral, DAILY    Multiple Vitamins-Minerals (CENTRUM SILVER PO) Oral    potassium chloride (KLOR-CON M) 20 MEQ extended release tablet TAKE 1 TABLET BY MOUTH  TWICE DAILY    sacubitril-valsartan (ENTRESTO) 24-26 MG per tablet 1 tablet, Oral, 2 TIMES DAILY    tamsulosin (FLOMAX) 0.4 mg, Oral, DAILY    torsemide (DEMADEX) 20 mg, Oral, DAILY    warfarin (COUMADIN) 5 mg, Oral, EVERY EVENING       No Known Allergies    Review of Systems:   Constitutional: negative for weight changes or fevers  Genitourinary: negative for bowel/bladder incontinence   Musculoskeletal: positive for low back pain  Neurological: negative for any leg weakness or numbness/tingling  Behavioral/Psych: negative for anxiety/depression   All other systems reviewed and are negative    Objective:     Vitals:    07/13/22 1413   BP: 106/78       Constitutional: Pleasant, no acute distress   Head: Normocephalic, atraumatic   Eyes: Conjunctivae normal   Neck: Supple, symmetrical   Lungs: Normal respiratory effort, non-labored breathing   Cardiovascular: Limbs warm and well perfused   Abdomen: Non-protruded Musculoskeletal: Muscle bulk symmetric, no atrophy, no gross deformities   · Lower Extremities: appreciable swelling in lower limbs  · Thorax: kyphosis appreciated  · Lumbar Spine: ROM severely reduced in extension. Lumbar paraspinals tender to palpation bilaterally. SLR neg bilaterally. Positive facet loading bilaterally. Bilateral SI joints non-tender to palpation. Neurological: Cranial nerves II-XII grossly intact. · Gait - Antalgic gait. Ambulates with assistive device. · Motor: No focal motor deficits in lower limbs   · Reflexes: negative ankle clonus  Skin: No rashes or lesions visible in low back  Psychological: Cooperative, no exaggerated pain behaviors       Assessment:    Diagnosis Orders   1. Lumbar spondylosis  CHG FLUOR NEEDLE/CATH SPINE/PARASPINAL DX/THER ADDON    MS INJ DX/THER AGNT PARAVERT FACET JOINT, LUMBAR/SAC, 1ST LEVEL    MS INJ DX/THER AGNT PARAVERT FACET JOINT, LUMBAR/SAC, 2ND LEVEL   2. Degenerative disc disease, lumbar     3. Other chronic pain     4. Myofascial pain           Anne Guillermo is a 78 y.o.male presenting to the pain clinic for evaluation of chronic low back pain. His clinical history and physical examination are consistent with lumbar facet mediated pain secondary lumbar spondylosis and degenerative disc disease with an associated myofascial pain component. I will set the patient up for diagnostic lumbar medial branch blocks targeting the bilateral facet joints at L4/L5 and L5/S1. We will permission to hold his Coumadin. He responded significantly to his diagnostic lumbar medial branch blocks and I set him up for confirmatory blocks at the same levels. Plan: The following treatment recommendations and plan were discussed in detail with Anne Guillermo. Imaging/Studies/Labs:   I have reviewed patients imaging of CT L-spine report.     Analgesics:   For his chronic pain, I started patient on low-dose Percocet 2.5 mg he can take up to twice a day as needed for severe pain (pain greater than 7/10). Patient is advised take this medication as prescribed is taking more than prescribed can lead development of tolerance, physical dependence, addiction. OARRS reviewed and is appropriate. Pain contract signed. Patient is taking Acetaminophen. Patient informed that the maximum amount of acetaminophen taken on a regular basis should only be 4000 mg per day. Adjuvants:   None    Interventions: In presence of lumbar axial back pain/cervical neck and with physical exam consistent for facetal pain, confimatory medial branch blocks at bilateral L4/L5 and L5/S1 was chosen. The risks and benefits were discussed in detail with the patient. Patient wants to proceed with the injection. Anticoagulation/NPO Recommendations:   Patient will need to hold Coumadin x 5 days prior to the procedure. Patient will need to be NPO x 8 hours prior to the procedure. Plan to start an IV prior to the procedure. Multidisciplinary Pain Management:   In the presence of complex, chronic, and multi-factorial pain, the importance of a multidisciplinary approach to pain management in the patients management regimen was emphasized and discussed in great detail.    PHYSICAL THERAPY: Patient is advised to continue gentle ROM/stretching exercises    Referrals:  None    Prescriptions Written This Visit:       Follow-up: Confirmatory lumbar MBBs      Jef Sarah DO  Interventional Pain Management/PM&R   [place]

## 2022-07-17 NOTE — H&P
Today, patient presents for planned confimatory medial branch blocks at bilateral L4/L5 and L5/S1. This note is reflective of the patient's previous visit for evaluation. We will proceed with today's planned procedure. Since patient's last visit for evaluation, there have been no interval changes in medical history. Patient has no new numbness, weakness, or focal neurological deficit since evaluation. Patient has no contraindications to injection (no anticoagulation or recent antibiotic intake for active infections), and has a  present or is able to drive themselves (as discussed and cleared by physician). Allergies to latex, contrast dye, and steroid medications have been confirmed with the patient prior to the procedure. NPO necessity has been assessed and accepted based on procedure complexity. The risks and benefits of the procedure have been explained including but are not limited to infection, bleeding, paralysis, immediate post procedure weakness, and dizziness; the patient acknowledges understanding and desires to proceed with the procedure. Patient has signed consent for same procedure as discussed in previous clinic encounter. All other questions and concerns were addressed at bedside. See procedure note for full details. Post procedure Instructions: The patient was advised not to drive during the day of the procedure and not to engage in any significant decision making (unless otherwise states by physician). The patient was also advised to be cautious with walking/activity for 24 hours following today's visit and asked not to engage in over-exertion (unless otherwise states by physician). After this time, it is ok to resume pre-procedure level of activity. Patient advised to apply ice to site of injection in situations of pain and discomfort. Patient advised to not submerge site of injection during bath or pool activities for approximately 24 hours post-procedure.  Patient attested to understanding post procedure directions / restrictions. All other questions and concerns addressed before patient discharge in ambulatory fashion. Chronic Pain/PM&R Clinic Note     Encounter Date: 7/13/22    Subjective:   Chief Complaint:   No chief complaint on file. History of Present Illness:   Shantal Rosado is a 78 y.o. male seen in the clinic initially on 06/06/22 upon request from Chuyita Soares MD (orthopedic surgery) for his history of chronic low back pain. He has medical history of CAD, A. fib (on Coumadin), CHF, and ICD placement. He has been dealing with low back pain for several years that is progressively getting worse. He describes the majority of his back pain to be in the small of his low back at his waistline. He states that this pain is a constant aching, stabbing pain that he rates as a 5/10. He states that this pain is worse with any activity where he is standing up straight with his cane or doing any back extension maneuvers. His pain is improved with rest.  He states he has been managing pain with use of Tylenol and has not tried other medications. He feels like they are not very effective. He met with the orthopedic surgery team who performed some lumbar epidural steroid injections which she feels like helped somewhat but continues to have low back pain. He denies any pain radiating further down his leg, focal leg weakness, leg numbness/tingling, persistent groin numbness, or bowel/bladder incontinence episodes. Today, 7/13/2022, patient resents for planned follow-up for chronic low back pain. He underwent diagnostic lumbar medial branch blocks on the 6/29/2022. He reports greater than 90% relief from this injection lasting 3 days in duration. He states that he has had really noticed significant improvement when he started out of his wheelchair. He states that this is where the majority of his pain is debilitating and is when he is up and out of his wheelchair. He states he had significant improvement in his ability to stand upright and straighter as well during this timeframe. He would like to proceed with the confirmatory steps of his injection series. He denies any other new symptoms this point. History of Interventions:   Surgery: No previous lumbar surgeries  Injections: None    Current Treatment Medications:   Tylenol PRN    Historical Treatment Medications:   Tramadol - ineffective    Imaging/Studies:  CT L-spine (10/21/21)        Past Medical History:   Diagnosis Date    Arthritis     Atrial fibrillation [facility])     [employer] single ICD 1/6/2016    Cardiomyopathy ([facility] [address]     Cardiomyopathy, likely nonischemic based on the patient's description. However, don't have a cardiac cath report from [facility]. Hypertension     ICD (implantable cardiac defibrillator) battery depletion: 9/24/2013: Generator replacement using [employer] 9/24/2013 9/24/2013: Generator replacement using [employer] device. Atrial lead was capped. Dr. Betsy Finnegan    ICD (implantable cardiac defibrillator), dual, in situ     [employer] dual ICD    Low blood pressure     Low blood pressure with rapid atrial fibrillation. Prolonged emergence from general anesthesia        Past Surgical History:   Procedure Laterality Date    CHOLECYSTECTOMY      COLONOSCOPY      CYSTOSCOPY  05/11/2021    DOPPLER ECHOCARDIOGRAPHY  7 08 2010    Global LV dilatation and dysfunction. EF 35%. Moderate biatrial enlargement. Mild mitral regurgitation and mild tricuspid regurgitation. No obvious stenotic valves. Borderline to mild pulmonary hypertension. No pericardial effusion.     HAND SURGERY  06/26/2017    HERNIA REPAIR  8 03 [phone] [place], [place].    OTHER SURGICAL HISTORY Left 01/20/2017    Left [facility] arthroplasty    PACEMAKER PLACEMENT      x3    PAIN MANAGEMENT PROCEDURE Bilateral 6/29/2022    lumbar facet medial branch blocks bilateral Lumbar4/5, 5/Sacral 1 [facility]    UPPER GASTROINTESTINAL ENDOSCOPY  12/2016    with dilation in [place]       Family History   Problem Relation Age of Onset    Alzheimer's Disease Father     Cancer Brother         Brother had throat cancer. Heart Disease Brother         Another brother had heart disease. Pacemaker Brother          Medications & Allergies:   Current Outpatient Medications   Medication Instructions    acetaminophen (TYLENOL) 650 mg, Oral, EVERY 6 HOURS PRN    amiodarone (CORDARONE) 200 MG tablet TAKE 1 TABLET BY MOUTH  DAILY    Boswellia-Glucosamine-Vit D (OSTEO BI-FLEX ONE PER DAY) TABS Oral, 2 TIMES DAILY    Calcium Carbonate-Vit D-Min (CALCIUM 1200 PO) Oral    Cholecalciferol (VITAMIN D3) 50 MCG (2000 UT) CAPS Oral    fish oil 1,000 mg, Oral, 2 TIMES DAILY    metOLazone (ZAROXOLYN) 5 mg, Oral, DAILY PRN    metoprolol tartrate (LOPRESSOR) 25 mg, Oral, DAILY    mirtazapine (REMERON) 15 mg, Oral, DAILY    Multiple Vitamins-Minerals (CENTRUM SILVER PO) Oral    potassium chloride (KLOR-CON M) 20 MEQ extended release tablet TAKE 1 TABLET BY MOUTH  TWICE DAILY    sacubitril-valsartan (ENTRESTO) 24-26 MG per tablet 1 tablet, Oral, 2 TIMES DAILY    tamsulosin (FLOMAX) 0.4 mg, Oral, DAILY    torsemide (DEMADEX) 20 mg, Oral, DAILY    warfarin (COUMADIN) 5 mg, Oral, EVERY EVENING       No Known Allergies    Review of Systems:   Constitutional: negative for weight changes or fevers  Genitourinary: negative for bowel/bladder incontinence   Musculoskeletal: positive for low back pain  Neurological: negative for any leg weakness or numbness/tingling  Behavioral/Psych: negative for anxiety/depression   All other systems reviewed and are negative    Objective: There were no vitals filed for this visit.       Constitutional: Pleasant, no acute distress   Head: Normocephalic, atraumatic   Eyes: Conjunctivae normal   Neck: Supple, symmetrical   Lungs: Normal respiratory effort, non-labored breathing Cardiovascular: Limbs warm and well perfused   Abdomen: Non-protruded   Musculoskeletal: Muscle bulk symmetric, no atrophy, no gross deformities   · Lower Extremities: appreciable swelling in lower limbs  · Thorax: kyphosis appreciated  · Lumbar Spine: ROM severely reduced in extension. Lumbar paraspinals tender to palpation bilaterally. SLR neg bilaterally. Positive facet loading bilaterally. Bilateral SI joints non-tender to palpation. Neurological: Cranial nerves II-XII grossly intact. · Gait - Antalgic gait. Ambulates with assistive device. · Motor: No focal motor deficits in lower limbs   · Reflexes: negative ankle clonus  Skin: No rashes or lesions visible in low back  Psychological: Cooperative, no exaggerated pain behaviors       Assessment:    Diagnosis Orders   1. Lumbar spondylosis  CHG FLUOR NEEDLE/CATH SPINE/PARASPINAL DX/THER ADDON    VA INJ DX/THER AGNT PARAVERT FACET JOINT, LUMBAR/SAC, 1ST LEVEL    VA INJ DX/THER AGNT PARAVERT FACET JOINT, LUMBAR/SAC, 2ND LEVEL   2. Degenerative disc disease, lumbar     3. Other chronic pain     4. Myofascial pain           Francisca Lu is a 78 y.o.male presenting to the pain clinic for evaluation of chronic low back pain. His clinical history and physical examination are consistent with lumbar facet mediated pain secondary lumbar spondylosis and degenerative disc disease with an associated myofascial pain component. I will set the patient up for diagnostic lumbar medial branch blocks targeting the bilateral facet joints at L4/L5 and L5/S1. We will permission to hold his Coumadin. He responded significantly to his diagnostic lumbar medial branch blocks and I set him up for confirmatory blocks at the same levels. Plan: The following treatment recommendations and plan were discussed in detail with Francisca Lu. Imaging/Studies/Labs:   I have reviewed patients imaging of CT L-spine report.     Analgesics:   For his chronic pain, I started patient on low-dose Percocet 2.5 mg he can take up to twice a day as needed for severe pain (pain greater than 7/10). Patient is advised take this medication as prescribed is taking more than prescribed can lead development of tolerance, physical dependence, addiction. OARRS reviewed and is appropriate. Pain contract signed. Patient is taking Acetaminophen. Patient informed that the maximum amount of acetaminophen taken on a regular basis should only be 4000 mg per day. Adjuvants:   None    Interventions: In presence of lumbar axial back pain/cervical neck and with physical exam consistent for facetal pain, confimatory medial branch blocks at bilateral L4/L5 and L5/S1 was chosen. The risks and benefits were discussed in detail with the patient. Patient wants to proceed with the injection. Anticoagulation/NPO Recommendations:   Patient will need to hold Coumadin x 5 days prior to the procedure. Patient will need to be NPO x 8 hours prior to the procedure. Plan to start an IV prior to the procedure. Multidisciplinary Pain Management:   In the presence of complex, chronic, and multi-factorial pain, the importance of a multidisciplinary approach to pain management in the patients management regimen was emphasized and discussed in great detail.    PHYSICAL THERAPY: Patient is advised to continue gentle ROM/stretching exercises    Referrals:  None    Prescriptions Written This Visit:       Follow-up: Confirmatory lumbar MBBs      Jef Major,   Interventional Pain Management/PM&R   [place]

## 2022-07-19 NOTE — PROCEDURES
Pre-operative Diagnosis: Lumbar facet pain     Post-operative Diagnosis: Lumbar facet pain     Procedure: Bilateral lumbar medial branch blocks targeting facet joints of L4/L5 and L5/S1     Procedure Description:  After consent was obtained, the patient was placed in the prone position with a pillow under the abdomen to reduce the lordotic curve of the lumbar spine. The lower back was prepped with chloraprep and draped in a sterile fashion. Then, 0.5 cc of 1 % lidocaine was used for local anesthesia of the skin and superficial subcutaneous tissues. Three 22-gauge 3.5-inch spinal needles were advanced under fluoroscopy in an AP view: one at the sacral ala and two at the junction of the right superior articular process and the transverse process of the L4 and L5 vertebrae. There was no paresthesias, heme, or CSF obtained. Needle placement was confirmed using AP and oblique views. Then, 0.5 cc of 0.5% bupivacaine was injected at each site without complications. The procedure was repeated on the contralateral side. The patient tolerated the procedure well, transported to the recovery room, and discharged in ambulatory fashion.      Procedural Complications: None  Estimated Blood Loss: 0 mL        IV sedation was used during the procedure:  - Moderate intravenous conscious sedation was supervised by Dr. Linda Quinn  - The patient was independently monitored by a Registered Nurse assigned to the procedure room  - Monitoring included automated blood pressure, continuous EKG, and continuous pulse oximetry  - The detailed conscious record is permanently stored in the [place] [id number]  - The following is the conscious sedation record:  Start Time: 08:31  End Time : 08:46  Duration: 15 minutes   Medications Administered: 1 mg Versed, 50 mcg Fentanyl     Jef Casarez DO  Interventional Pain Management/PM&R  [facility]

## 2022-07-19 NOTE — PRE SEDATION
[address] [address]  Pre-Sedation/Analgesia History & Physical    Pt Name: Gabby Nowak  MRN: [id number]  YOB: 1940  Provider Performing Procedure: Dimitry Lee DO   Primary Care Physician: [address]       has a past medical history of Arthritis, Atrial fibrillation [facility]), [employer] single ICD, Cardiomyopathy [facility]), Hypertension, ICD (implantable cardiac defibrillator) battery depletion: 9/24/2013: Generator replacement using [employer], ICD (implantable cardiac defibrillator), dual, in situ, Low blood pressure, and Prolonged emergence from general anesthesia. SURGICAL HISTORY   has a past surgical history that includes hernia repair (8 03 2007); doppler echocardiography (7 08 2010); Cholecystectomy; Upper gastrointestinal endoscopy (12/2016); other surgical history (Left, 01/20/2017); Hand surgery (06/26/2017); Colonoscopy; pacemaker placement; Cystoscopy (05/11/2021); and Pain management procedure (Bilateral, 6/29/2022). ALLERGIES   Allergies as of 07/13/2022    (No Known Allergies)       MEDICATIONS   Prior to Admission medications    Medication Sig Start Date End Date Taking?  Authorizing Provider   torsemide (DEMADEX) 20 MG tablet Take 1 tablet by mouth daily 6/9/22   Srinivasan Mcleod MD   metoprolol tartrate (LOPRESSOR) 25 MG tablet Take 25 mg by mouth daily    Historical Provider, MD   mirtazapine (REMERON) 15 MG tablet Take 15 mg by mouth daily  3/22/22   Historical Provider, MD   tamsulosin (FLOMAX) 0.4 MG capsule Take 0.4 mg by mouth daily    Historical Provider, MD   Multiple Vitamins-Minerals (CENTRUM SILVER PO) Take by mouth    Historical Provider, MD   Cholecalciferol (VITAMIN D3) 50 MCG (2000 UT) CAPS Take by mouth    Historical Provider, MD   Calcium Carbonate-Vit D-Min (CALCIUM 1200 PO) Take by mouth    Historical Provider, MD   metOLazone (ZAROXOLYN) 5 MG tablet Take 1 tablet by mouth daily as needed (as directed by HF clinic for weight gain/swelling) 3/3/22   FAITH Billingsley - CNP   potassium chloride (KLOR-CON M) 20 MEQ extended release tablet TAKE 1 TABLET BY MOUTH  TWICE DAILY  Patient taking differently: Per wife taking 30mEq daily 1/4/22   Volodymyr Nieto MD   amiodarone (CORDARONE) 200 MG tablet TAKE 1 TABLET BY MOUTH  DAILY 1/4/22   Volodymyr Nieto MD   sacubitril-valsartan (ENTRESTO) 24-26 MG per tablet Take 1 tablet by mouth 2 times daily 12/13/21   Volodymyr Nieto MD   Boswellia-Glucosamine-Vit D (OSTEO BI-FLEX ONE PER DAY) TABS Take by mouth 2 times daily    Historical Provider, MD   acetaminophen (TYLENOL) 325 MG tablet Take 650 mg by mouth every 6 hours as needed for Pain    Historical Provider, MD   warfarin (COUMADIN) 5 MG tablet Take 5 mg by mouth every evening    Historical Provider, MD   Omega-3 Fatty Acids (FISH OIL) 1000 MG CAPS Take 1,000 mg by mouth 2 times daily     Historical Provider, MD     PHYSICAL:   Vitals:    07/19/22 0838   BP: 128/72   Pulse: 60   Resp: 14   Temp: 97 °F (36.1 °C)   SpO2: 96%     PLANNED PROCEDURE   See procedure note  SEDATION  Planned agent: Versed and Fentanyl  ASA Classification: 2  Class 1: A normal healthy patient  Class 2: Pt with mild to moderate systemic disease  Class 3: Severe systemic disease or disturbance  Class 4: Severe systemic disorders that are already life threatening. Class 5: Moribund pt with little chances of survival, for more than 24 hours. Mallampati I Airway Classification: 2    1. Pre-procedure diagnostic studies complete and results available. 2. Previous sedation/anesthesia experiences assessed. 3. The patient is an appropriate candidate to undergo the planned procedure sedation and anesthesia. (Refer to nursing sedation/analgesia documentation record)  4. Formulation and discussion of sedation/procedure plan, risks, and expectations with patient and/or responsible adult completed.   5. Patient examined immediately prior to the procedure.  (Refer to nursing sedation/analgesia documentation record)    Katerina Freitas DO  Electronically signed 7/19/2022 at 8:59 AM

## 2022-07-19 NOTE — POST SEDATION
[facility]  Sedation/Analgesia Post Sedation Record    Pt Name: Harriet Goodwin  MRN: [id number]  YOB: 1940  Procedure Performed By: Rangel Blair DO  Primary Care Physician: Dawson Rios    POST-PROCEDURE    Physicians/Assistants: Rangel Blair DO  Procedure Performed: See Procedure Note   Sedation/Anesthesia: Versed and Fentanyl (See procedure note for amount and duration)  Estimated Blood Loss:     0  ml  Specimens Removed: None        Complications: None           Jef Nj DO  Electronically signed 7/19/2022 at 8:59 AM

## 2022-07-19 NOTE — PROGRESS NOTES
3047: Patient arrives to recovery room via cart. Spontaneous respirations, vss, report received from surgical RN. Patient denies pain, numbness, tingling , nausea. Injection site clean, dry, intact. HOB elevated. IV capped. Snack and drink given. Call light within reach. 0900: patient getting dressed and ride called. Patient denies needs. 0237: patient wheeled to discharge lobby in stable condition. Patient discharged home with family.

## 2022-07-27 NOTE — PROGRESS NOTES
Dr Bess Schulz pt   [facility]     Battery 1.7 yrs remaining  [place] [facility]

## 2022-08-01 NOTE — PROGRESS NOTES
Chronic Pain/PM&R Clinic Note     Encounter Date: 8/1/22    Subjective:   Chief Complaint:   Chief Complaint   Patient presents with    Follow-up       History of Present Illness:   Rubens Mendiola is a 78 y.o. male seen in the clinic initially on 06/06/22 upon request from Eric Head MD (orthopedic surgery) for his history of chronic low back pain. He has medical history of CAD, A. fib (on Coumadin), CHF, and ICD placement. He has been dealing with low back pain for several years that is progressively getting worse. He describes the majority of his back pain to be in the small of his low back at his waistline. He states that this pain is a constant aching, stabbing pain that he rates as a 5/10. He states that this pain is worse with any activity where he is standing up straight with his cane or doing any back extension maneuvers. His pain is improved with rest.  He states he has been managing pain with use of Tylenol and has not tried other medications. He feels like they are not very effective. He met with the orthopedic surgery team who performed some lumbar epidural steroid injections which she feels like helped somewhat but continues to have low back pain. He denies any pain radiating further down his leg, focal leg weakness, leg numbness/tingling, persistent groin numbness, or bowel/bladder incontinence episodes. Today, 8/1/2022, patient presents for planned follow-up for chronic low back pain. He underwent confirmatory lumbar medial branch blocks on 7/19/2022. He reports 100% pain relief lasting 2-3 days in duration. He states he is able to stand upright out of his wheelchair without any low back pain. He was very happy overall with the results from this procedure. He would like to proceed with the ablation therapy for his low back for better long-term control of his chronic low back pain. He denies any other new symptoms this point.         History of Interventions:   Surgery: No previous lumbar surgeries  Injections: Diagnostic lumbar MBB  Confirmatory Lumbar MBB    Current Treatment Medications:   Tylenol PRN    Historical Treatment Medications:   Tramadol - ineffective    Imaging/Studies:  CT L-spine (10/21/21)        Past Medical History:   Diagnosis Date    Arthritis     Atrial fibrillation [facility])     [employer] single ICD 1/6/2016    Cardiomyopathy ([facility] [address]     Cardiomyopathy, likely nonischemic based on the patient's description. However, don't have a cardiac cath report from [facility]. Hypertension     ICD (implantable cardiac defibrillator) battery depletion: 9/24/2013: Generator replacement using [employer] 9/24/2013 9/24/2013: Generator replacement using [employer] device. Atrial lead was capped. Dr. Lars Mckinney    ICD (implantable cardiac defibrillator), dual, in situ     [employer] dual ICD    Low blood pressure     Low blood pressure with rapid atrial fibrillation. Prolonged emergence from general anesthesia        Past Surgical History:   Procedure Laterality Date    CHOLECYSTECTOMY      COLONOSCOPY      CYSTOSCOPY  05/11/2021    DOPPLER ECHOCARDIOGRAPHY  7 08 2010    Global LV dilatation and dysfunction. EF 35%. Moderate biatrial enlargement. Mild mitral regurgitation and mild tricuspid regurgitation. No obvious stenotic valves. Borderline to mild pulmonary hypertension. No pericardial effusion.     HAND SURGERY  06/26/2017    HERNIA REPAIR  [address], [place].    OTHER SURGICAL HISTORY Left 01/20/2017    Left [facility] arthroplasty    PACEMAKER PLACEMENT      x3    PAIN MANAGEMENT PROCEDURE Bilateral 6/29/2022    lumbar facet medial branch blocks bilateral Lumbar4/5, 5/Sacral 1 performed by Deric Scherer DO at [facility] Bilateral 7/19/2022    medial branch blocks at bilateral Lumber4/5 and 5/Sacral 1 performed by Deric Scherer DO at [address] [place] [address]  12/2016    with dilation in [place]. Deyanira's       Family History   Problem Relation Age of Onset    Alzheimer's Disease Father     Cancer Brother         Brother had throat cancer. Heart Disease Brother         Another brother had heart disease.      Pacemaker Brother          Medications & Allergies:   Current Outpatient Medications   Medication Instructions    acetaminophen (TYLENOL) 650 mg, Oral, EVERY 6 HOURS PRN    amiodarone (CORDARONE) 200 MG tablet TAKE 1 TABLET BY MOUTH  DAILY    B[place]ia-Glucosamine-Vit D (OSTEO BI-FLEX ONE PER DAY) TABS Oral, 2 TIMES DAILY    Calcium Carbonate-Vit D-Min (CALCIUM 1200 PO) Oral    Cholecalciferol (VITAMIN D3) 50 MCG (2000 UT) CAPS Oral    fish oil 1,000 mg, Oral, 2 TIMES DAILY    metOLazone (ZAROXOLYN) 5 mg, Oral, DAILY PRN    metoprolol tartrate (LOPRESSOR) 25 mg, Oral, DAILY    mirtazapine (REMERON) 15 mg, Oral, DAILY    Multiple Vitamins-Minerals (CENTRUM SILVER PO) Oral    potassium chloride (KLOR-CON M) 20 MEQ extended release tablet TAKE 1 TABLET BY MOUTH  TWICE DAILY    sacubitril-valsartan (ENTRESTO) 24-26 MG per tablet 1 tablet, Oral, 2 TIMES DAILY    tamsulosin (FLOMAX) 0.4 mg, Oral, DAILY    torsemide (DEMADEX) 20 mg, Oral, DAILY    warfarin (COUMADIN) 5 mg, Oral, EVERY EVENING       No Known Allergies    Review of Systems:   Constitutional: negative for weight changes or fevers  Genitourinary: negative for bowel/bladder incontinence   Musculoskeletal: positive for low back pain  Neurological: negative for any leg weakness or numbness/tingling  Behavioral/Psych: negative for anxiety/depression   All other systems reviewed and are negative    Objective:     Vitals:    08/01/22 0929   BP: 100/66       Constitutional: Pleasant, no acute distress   Head: Normocephalic, atraumatic   Eyes: Conjunctivae normal   Neck: Supple, symmetrical   Lungs: Normal respiratory effort, non-labored breathing   Cardiovascular: Limbs warm and well perfused   Abdomen: Non-protruded   Musculoskeletal: Muscle bulk symmetric, no atrophy, no gross deformities   · Lower Extremities: appreciable swelling in lower limbs  · Thorax: kyphosis appreciated  · Lumbar Spine: ROM severely reduced in extension. Lumbar paraspinals tender to palpation bilaterally. SLR neg bilaterally. Positive facet loading bilaterally. Bilateral SI joints non-tender to palpation. Neurological: Cranial nerves II-XII grossly intact. · Gait - Antalgic gait. Ambulates with assistive device. · Motor: No focal motor deficits in lower limbs   · Reflexes: negative ankle clonus  Skin: No rashes or lesions visible in low back  Psychological: Cooperative, no exaggerated pain behaviors       Assessment:    Diagnosis Orders   1. Lumbosacral spondylosis without myelopathy  CHG FLUOR NEEDLE/CATH SPINE/PARASPINAL DX/THER ADDON    ME RADIOFREQUENCY NEUROTOMY LUMBAR OR SACRAL, W IMAGE GUIDANCE, SINGLE    ME RADIOFREQ NEUROTOMY LUMBAR OR SACRAL, W IMAGE GUIDE,EA ADDL LEVEL      2. Chronic pain syndrome        3. Myofascial pain        4. Degenerative disc disease, lumbar        5. Anticoagulated                Shelley Palomo is a 78 y.o.male presenting to the pain clinic for evaluation of chronic low back pain. His clinical history and physical examination are consistent with lumbar facet mediated pain secondary lumbar spondylosis and degenerative disc disease with an associated myofascial pain component. I will set the patient up for diagnostic lumbar medial branch blocks targeting the bilateral facet joints at L4/L5 and L5/S1. We will permission to hold his Coumadin. He responded significantly to his diagnostic and confirmatory lumbar medial branch blocks. I set him up for bilateral lumbar radiofrequency ablation starting these levels. Plan: The following treatment recommendations and plan were discussed in detail with Shelley Palomo. Imaging/Studies/Labs:   I have reviewed patients imaging of CT L-spine report.     Analgesics:   For his

## 2022-08-01 NOTE — TELEPHONE ENCOUNTER
Pt[facility]ed. Procedure and follow up scheduled. Educated on pre-procedure instructions, also mailed. Verbalized understanding.

## 2022-08-04 NOTE — TELEPHONE ENCOUNTER
Pt's wife-(EVA) called the office. Pt. Currently in the hospital for pneumonia. Procedure and follow up cancelled. Will call back at a later time to reschedule.

## 2025-05-18 NOTE — PROGRESS NOTES
Pt here for fu   No med list today
Musculoskeletal:  No recent active issues  Extremities:   No edema, good peripheral pulses      Objective:  Physical Exam  /60   Pulse 64   Ht 6' 1\" (1.854 m)   Wt 184 lb 4.9 oz (83.6 kg)   BMI 24.32 kg/m²   General:   Well developed, well nourished  Lungs:   Clear to auscultation  Heart:    Normal S1 S2, Slight murmur. no rubs, no gallops  Abdomen:   Soft, non tender, no organomegalies, positive bowel sounds  Extremities:   No edema, no cyanosis, good peripheral pulses  Neurological:   Awake, alert, oriented. No obvious focal deficits  Musculoskelatal:  No obvious deformities    Assessment:      Diagnosis Orders   1. Chronic atrial fibrillation ([address]     2. Dilated cardiomyopathy ([address]     ICD, no recent shocks, followed on regular basis      Plan:  No follow-ups on file. As above  Continue risk factor modification and medical management  Thank you for allowing me to participate in the care of your patient. Please don't hesitate to contact me regarding any further issues related to the patient care    Orders Placed:  No orders of the defined types were placed in this encounter. Medications Prescribed:  No orders of the defined types were placed in this encounter. Discussed use, benefit, and side effects of prescribed medications. All patient questions answered. Pt voicedunderstanding. Instructed to continue current medications, diet and exercise. Continue risk factor modification and medical management. Patient agreed with treatment plan. Follow up as directed.     Electronically signedby Kristina Lazcano MD on 7/23/2019 at 1:14 PM
[Normal] : normal bowel sounds, non-tender, no masses, soft, no no hepato-splenomegaly

## (undated) DEVICE — GAUZE SPONGES,USP TYPE VII GAUZE, 12 PLY: Brand: CURITY

## (undated) DEVICE — SYRINGE MED 10ML LUERLOCK TIP W/O SFTY DISP

## (undated) DEVICE — GLOVE BIOGEL POWDER FREE SZ 8

## (undated) DEVICE — NEEDLE HYPO 18GA L1.5IN THN WALL PIVOTING SHLD BVL ORIENTED

## (undated) DEVICE — 1840 FOAM BLOCK NEEDLE COUNTER: Brand: DEVON

## (undated) DEVICE — NEEDLE SPNL 22GA L3.5IN BLK HUB S STL REG WALL FIT STYL W/

## (undated) DEVICE — MARKER,SKIN,WI/RULER AND LABELS: Brand: MEDLINE

## (undated) DEVICE — HYPODERMIC SAFETY NEEDLE: Brand: MAGELLAN

## (undated) DEVICE — TOWEL,OR,DSP,ST,BLUE,STD,4/PK,20PK/CS: Brand: MEDLINE

## (undated) DEVICE — APPLICATOR MEDICATED 3 CC SOLUTION CLR STRL CHLORAPREP

## (undated) DEVICE — 3 ML SYRINGE LUER-LOCK TIP: Brand: MONOJECT